# Patient Record
Sex: FEMALE | URBAN - METROPOLITAN AREA
[De-identification: names, ages, dates, MRNs, and addresses within clinical notes are randomized per-mention and may not be internally consistent; named-entity substitution may affect disease eponyms.]

---

## 2017-08-02 ENCOUNTER — OFFICE (OUTPATIENT)
Dept: URBAN - METROPOLITAN AREA CLINIC 64 | Facility: CLINIC | Age: 27
End: 2017-08-02

## 2017-08-02 DIAGNOSIS — R19.4 CHANGE IN BOWEL HABIT: ICD-10-CM

## 2017-08-02 DIAGNOSIS — R14.0 ABDOMINAL DISTENSION (GASEOUS): ICD-10-CM

## 2017-08-02 PROCEDURE — 99202 OFFICE O/P NEW SF 15 MIN: CPT | Performed by: NURSE PRACTITIONER

## 2017-08-18 ENCOUNTER — CONVERSION ENCOUNTER (OUTPATIENT)
Dept: SURGERY | Facility: CLINIC | Age: 27
End: 2017-08-18

## 2017-09-15 ENCOUNTER — OFFICE (OUTPATIENT)
Dept: URBAN - METROPOLITAN AREA CLINIC 64 | Facility: CLINIC | Age: 27
End: 2017-09-15

## 2017-09-15 VITALS
WEIGHT: 251 LBS | SYSTOLIC BLOOD PRESSURE: 108 MMHG | HEIGHT: 64 IN | DIASTOLIC BLOOD PRESSURE: 78 MMHG | HEART RATE: 74 BPM

## 2017-09-15 DIAGNOSIS — R10.13 EPIGASTRIC PAIN: ICD-10-CM

## 2017-09-15 DIAGNOSIS — R11.2 NAUSEA WITH VOMITING, UNSPECIFIED: ICD-10-CM

## 2017-09-15 DIAGNOSIS — R19.7 DIARRHEA, UNSPECIFIED: ICD-10-CM

## 2017-09-15 PROCEDURE — 99213 OFFICE O/P EST LOW 20 MIN: CPT | Performed by: NURSE PRACTITIONER

## 2017-09-15 RX ORDER — PANTOPRAZOLE SODIUM 40 MG/1
40 TABLET, DELAYED RELEASE ORAL
Qty: 30 | Refills: 11 | Status: ACTIVE
Start: 2017-09-15

## 2019-06-04 VITALS — HEART RATE: 62 BPM | DIASTOLIC BLOOD PRESSURE: 80 MMHG | WEIGHT: 255.4 LBS | SYSTOLIC BLOOD PRESSURE: 122 MMHG

## 2019-07-10 ENCOUNTER — HOSPITAL ENCOUNTER (EMERGENCY)
Facility: HOSPITAL | Age: 29
Discharge: HOME | End: 2019-07-11
Attending: EMERGENCY MEDICINE | Admitting: EMERGENCY MEDICINE

## 2019-07-10 VITALS
TEMPERATURE: 98.1 F | HEART RATE: 78 BPM | OXYGEN SATURATION: 99 % | WEIGHT: 265.88 LBS | DIASTOLIC BLOOD PRESSURE: 77 MMHG | RESPIRATION RATE: 17 BRPM | HEIGHT: 64 IN | SYSTOLIC BLOOD PRESSURE: 114 MMHG | BODY MASS INDEX: 45.39 KG/M2

## 2019-07-10 DIAGNOSIS — S46.812A TRAPEZIUS STRAIN, LEFT, INITIAL ENCOUNTER: Primary | ICD-10-CM

## 2019-07-10 DIAGNOSIS — M79.602 ARM PAIN, DIFFUSE, LEFT: ICD-10-CM

## 2019-07-10 PROCEDURE — 99283 EMERGENCY DEPT VISIT LOW MDM: CPT

## 2019-07-10 PROCEDURE — 25010000002 METHYLPREDNISOLONE PER 80 MG

## 2019-07-10 RX ORDER — CYCLOBENZAPRINE HCL 10 MG
10 TABLET ORAL 3 TIMES DAILY
Qty: 30 TABLET | Refills: 0 | Status: SHIPPED | OUTPATIENT
Start: 2019-07-10 | End: 2021-11-30

## 2019-07-11 NOTE — ED PROVIDER NOTES
Subjective   Complaint arm and trapezius pain.  This is a 29-year-old who presents with pain on her left trapezius that mildly radiates down her left arm she denies any numbness weakness no chest pain or shortness of breath she denies any history trauma but states that she did wake up this morning sleeping half on the floor and half on her bed.  The patient denies any numbness or weakness.  No chest pain or shortness breath no fevers or chills no direct trauma.  She rates pain as a sharp stabbing pain that worsens with movement with no other alleviating factors with no radiation she rates as 4/10.        History provided by:  Patient      Review of Systems   Constitutional: Negative for chills and fever.   HENT: Negative for congestion and sore throat.    Eyes: Negative for redness.   Respiratory: Negative for shortness of breath.    Cardiovascular: Negative for chest pain.   Gastrointestinal: Negative for abdominal pain.   Endocrine: Negative for cold intolerance and heat intolerance.   Genitourinary: Negative for difficulty urinating and dysuria.   Musculoskeletal: Positive for myalgias. Negative for back pain.   Skin: Negative for rash.   Allergic/Immunologic: Negative for immunocompromised state.   Neurological: Negative for dizziness and weakness.   Hematological: Negative for adenopathy.   Psychiatric/Behavioral: Negative for confusion.   All other systems reviewed and are negative.      History reviewed. No pertinent past medical history.    Allergies   Allergen Reactions   • Amoxicillin-Pot Clavulanate Nausea And Vomiting   • Cefaclor Other (See Comments)     BRUISING     • Cefixime Nausea And Vomiting and Shortness Of Breath   • Cefprozil Shortness Of Breath   • Cefuroxime Axetil Other (See Comments)   • Cephalexin Shortness Of Breath   • Cephalosporins Shortness Of Breath   • Latex Shortness Of Breath   • Promethazine Hcl Other (See Comments)     SHAKE, ANXIOUS         History reviewed. No pertinent  surgical history.    No family history on file.    Social History     Socioeconomic History   • Marital status:      Spouse name: Not on file   • Number of children: Not on file   • Years of education: Not on file   • Highest education level: Not on file           Objective   Physical Exam   Constitutional: She is oriented to person, place, and time. She appears well-developed and well-nourished. No distress.   HENT:   Head: Normocephalic and atraumatic.   Right Ear: External ear normal.   Left Ear: External ear normal.   Nose: Nose normal.   Eyes: Conjunctivae and EOM are normal. Pupils are equal, round, and reactive to light.   Neck: Normal range of motion. Neck supple. No JVD present.   Cardiovascular: Normal rate, regular rhythm, normal heart sounds and intact distal pulses.   Pulmonary/Chest: Effort normal and breath sounds normal. No stridor. She has no wheezes. She has no rales.   Abdominal: Soft. Bowel sounds are normal. She exhibits no mass. There is no tenderness. There is no rebound and no guarding. No hernia.   Musculoskeletal: Normal range of motion.   There is tenderness palpation about the left trapezius with a palpable underlying muscular nodule/trigger point felt.  This exacerbated the patient's pain.  The patient had full range of motion about the shoulder elbow and wrist without any difficulty or discomfort.  Has intact radial median ulnar  Nerves.  2+ radial pulses noted.  No erythema induration or warmth.  No palpable cords.  Strength is 5/5.   Lymphadenopathy:     She has no cervical adenopathy.   Neurological: She is alert and oriented to person, place, and time. No cranial nerve deficit.   Skin: Skin is warm and dry. Capillary refill takes less than 2 seconds. No rash noted.   Psychiatric: She has a normal mood and affect.   Nursing note and vitals reviewed.      Procedures  Trigger point injection:     Area overlying maximal tenderness/palpable nodularity was prepped and draped in a  "sterile fashion and injection was performed using a mixture of 2% lidocaine and Depo-Medrol with immediate relief of the patient's pain.  Patient tolerated procedure well and Band-Aid was applied.  No significant bleeding noted.      ED Course        No orders to display     Lab Results (last 72 hours)     ** No results found for the last 72 hours. **        Medications - No data to display  Vitals:    07/10/19 2323 07/10/19 2326   BP:  114/77   Pulse:  78   Resp:  17   Temp:  98.1 °F (36.7 °C)   SpO2:  99%   Weight: 121 kg (265 lb 14 oz)    Height: 162.6 cm (64\")                MDM  I discussed follow care instructions with the patient who expressed understanding.    Depo-Medrol dose 80 mg.    Final diagnoses:   Trapezius strain, left, initial encounter   Arm pain, diffuse, left            Judah Ramirez MD  07/10/19 9075       Judah Ramirez MD  07/20/19 9302    "

## 2019-07-11 NOTE — DISCHARGE INSTRUCTIONS
Heat pad.  Therapeutica pillow    Return for increased pain numbness weakness chest pain or shortness breath.

## 2019-07-16 ENCOUNTER — HOSPITAL ENCOUNTER (EMERGENCY)
Facility: HOSPITAL | Age: 29
Discharge: HOME | End: 2019-07-17
Attending: EMERGENCY MEDICINE | Admitting: EMERGENCY MEDICINE

## 2019-07-16 DIAGNOSIS — K52.9 GASTROENTERITIS: Primary | ICD-10-CM

## 2019-07-16 LAB
BASOPHILS # BLD AUTO: 0 10*3/MM3 (ref 0–0.2)
BASOPHILS NFR BLD AUTO: 0.3 % (ref 0–1.5)
BILIRUB UR QL STRIP: NEGATIVE
CLARITY UR: CLEAR
COLOR UR: YELLOW
DEPRECATED RDW RBC AUTO: 42.9 FL (ref 37–54)
EOSINOPHIL # BLD AUTO: 0.2 10*3/MM3 (ref 0–0.4)
EOSINOPHIL NFR BLD AUTO: 1.3 % (ref 0.3–6.2)
ERYTHROCYTE [DISTWIDTH] IN BLOOD BY AUTOMATED COUNT: 14 % (ref 12.3–15.4)
GLUCOSE UR STRIP-MCNC: NEGATIVE MG/DL
HCG SERPL QL: NEGATIVE
HCT VFR BLD AUTO: 39.8 % (ref 34–46.6)
HGB BLD-MCNC: 13.4 G/DL (ref 12–15.9)
HGB UR QL STRIP.AUTO: NEGATIVE
KETONES UR QL STRIP: NEGATIVE
LEUKOCYTE ESTERASE UR QL STRIP.AUTO: NEGATIVE
LYMPHOCYTES # BLD AUTO: 2.7 10*3/MM3 (ref 0.7–3.1)
LYMPHOCYTES NFR BLD AUTO: 21.6 % (ref 19.6–45.3)
MCH RBC QN AUTO: 29.2 PG (ref 26.6–33)
MCHC RBC AUTO-ENTMCNC: 33.7 G/DL (ref 31.5–35.7)
MCV RBC AUTO: 86.7 FL (ref 79–97)
MONOCYTES # BLD AUTO: 0.9 10*3/MM3 (ref 0.1–0.9)
MONOCYTES NFR BLD AUTO: 7.3 % (ref 5–12)
NEUTROPHILS # BLD AUTO: 8.8 10*3/MM3 (ref 1.7–7)
NEUTROPHILS NFR BLD AUTO: 69.5 % (ref 42.7–76)
NITRITE UR QL STRIP: NEGATIVE
NRBC BLD AUTO-RTO: 0.1 /100 WBC (ref 0–0.2)
PH UR STRIP.AUTO: 6 [PH] (ref 5–8)
PLATELET # BLD AUTO: 244 10*3/MM3 (ref 140–450)
PMV BLD AUTO: 9.1 FL (ref 6–12)
PROT UR QL STRIP: NEGATIVE
RBC # BLD AUTO: 4.59 10*6/MM3 (ref 3.77–5.28)
SP GR UR STRIP: 1.02 (ref 1–1.03)
UROBILINOGEN UR QL STRIP: NORMAL
WBC NRBC COR # BLD: 12.6 10*3/MM3 (ref 3.4–10.8)

## 2019-07-16 PROCEDURE — 80053 COMPREHEN METABOLIC PANEL: CPT | Performed by: EMERGENCY MEDICINE

## 2019-07-16 PROCEDURE — 81003 URINALYSIS AUTO W/O SCOPE: CPT | Performed by: EMERGENCY MEDICINE

## 2019-07-16 PROCEDURE — 99283 EMERGENCY DEPT VISIT LOW MDM: CPT

## 2019-07-16 PROCEDURE — 96374 THER/PROPH/DIAG INJ IV PUSH: CPT

## 2019-07-16 PROCEDURE — 84703 CHORIONIC GONADOTROPIN ASSAY: CPT | Performed by: EMERGENCY MEDICINE

## 2019-07-16 PROCEDURE — 85025 COMPLETE CBC W/AUTO DIFF WBC: CPT | Performed by: EMERGENCY MEDICINE

## 2019-07-16 PROCEDURE — 96375 TX/PRO/DX INJ NEW DRUG ADDON: CPT

## 2019-07-16 PROCEDURE — 25010000002 ONDANSETRON PER 1 MG: Performed by: EMERGENCY MEDICINE

## 2019-07-16 PROCEDURE — 83690 ASSAY OF LIPASE: CPT | Performed by: EMERGENCY MEDICINE

## 2019-07-16 RX ORDER — FAMOTIDINE 10 MG/ML
20 INJECTION, SOLUTION INTRAVENOUS ONCE
Status: COMPLETED | OUTPATIENT
Start: 2019-07-16 | End: 2019-07-16

## 2019-07-16 RX ORDER — ONDANSETRON 2 MG/ML
4 INJECTION INTRAMUSCULAR; INTRAVENOUS ONCE
Status: COMPLETED | OUTPATIENT
Start: 2019-07-16 | End: 2019-07-16

## 2019-07-16 RX ADMIN — ONDANSETRON 4 MG: 2 INJECTION INTRAMUSCULAR; INTRAVENOUS at 23:33

## 2019-07-16 RX ADMIN — FAMOTIDINE 20 MG: 10 INJECTION, SOLUTION INTRAVENOUS at 23:33

## 2019-07-16 RX ADMIN — SODIUM CHLORIDE 1000 ML: 0.9 INJECTION, SOLUTION INTRAVENOUS at 23:32

## 2019-07-17 ENCOUNTER — APPOINTMENT (OUTPATIENT)
Dept: CT IMAGING | Facility: HOSPITAL | Age: 29
End: 2019-07-17

## 2019-07-17 VITALS
HEIGHT: 64 IN | HEART RATE: 64 BPM | BODY MASS INDEX: 44.49 KG/M2 | OXYGEN SATURATION: 99 % | RESPIRATION RATE: 12 BRPM | DIASTOLIC BLOOD PRESSURE: 64 MMHG | SYSTOLIC BLOOD PRESSURE: 104 MMHG | WEIGHT: 260.58 LBS | TEMPERATURE: 98 F

## 2019-07-17 LAB
ALBUMIN SERPL-MCNC: 3.3 G/DL (ref 3.5–4.8)
ALBUMIN/GLOB SERPL: 1.1 G/DL (ref 1–1.7)
ALP SERPL-CCNC: 73 U/L (ref 32–91)
ALT SERPL W P-5'-P-CCNC: 9 U/L (ref 14–54)
ANION GAP SERPL CALCULATED.3IONS-SCNC: 13.8 MMOL/L (ref 5–15)
AST SERPL-CCNC: 13 U/L (ref 15–41)
BILIRUB SERPL-MCNC: 0.8 MG/DL (ref 0.3–1.2)
BUN BLD-MCNC: 11 MG/DL (ref 8–20)
BUN/CREAT SERPL: 13.8 (ref 5.4–26.2)
CALCIUM SPEC-SCNC: 8.2 MG/DL (ref 8.9–10.3)
CHLORIDE SERPL-SCNC: 104 MMOL/L (ref 101–111)
CO2 SERPL-SCNC: 19 MMOL/L (ref 22–32)
CREAT BLD-MCNC: 0.8 MG/DL (ref 0.4–1)
GFR SERPL CREATININE-BSD FRML MDRD: 84 ML/MIN/1.73
GLOBULIN UR ELPH-MCNC: 3.1 GM/DL (ref 2.5–3.8)
GLUCOSE BLD-MCNC: 89 MG/DL (ref 65–99)
LIPASE SERPL-CCNC: 43 U/L (ref 22–51)
POTASSIUM BLD-SCNC: 3.8 MMOL/L (ref 3.6–5.1)
PROT SERPL-MCNC: 6.4 G/DL (ref 6.1–7.9)
SODIUM BLD-SCNC: 133 MMOL/L (ref 136–144)

## 2019-07-17 PROCEDURE — 74177 CT ABD & PELVIS W/CONTRAST: CPT

## 2019-07-17 PROCEDURE — 0 IOPAMIDOL PER 1 ML: Performed by: EMERGENCY MEDICINE

## 2019-07-17 RX ORDER — ONDANSETRON 4 MG/1
4 TABLET, FILM COATED ORAL EVERY 8 HOURS PRN
Qty: 20 TABLET | Refills: 0 | Status: SHIPPED | OUTPATIENT
Start: 2019-07-17 | End: 2021-11-30

## 2019-07-17 RX ADMIN — IOPAMIDOL 100 ML: 755 INJECTION, SOLUTION INTRAVENOUS at 00:44

## 2020-02-17 ENCOUNTER — APPOINTMENT (OUTPATIENT)
Dept: GENERAL RADIOLOGY | Facility: HOSPITAL | Age: 30
End: 2020-02-17

## 2020-02-17 ENCOUNTER — HOSPITAL ENCOUNTER (EMERGENCY)
Facility: HOSPITAL | Age: 30
Discharge: HOME | End: 2020-02-17
Admitting: EMERGENCY MEDICINE

## 2020-02-17 VITALS
HEIGHT: 64 IN | OXYGEN SATURATION: 99 % | TEMPERATURE: 98 F | BODY MASS INDEX: 40.63 KG/M2 | DIASTOLIC BLOOD PRESSURE: 73 MMHG | HEART RATE: 104 BPM | RESPIRATION RATE: 17 BRPM | SYSTOLIC BLOOD PRESSURE: 116 MMHG | WEIGHT: 238 LBS

## 2020-02-17 DIAGNOSIS — J10.1 INFLUENZA B: Primary | ICD-10-CM

## 2020-02-17 DIAGNOSIS — R06.02 SHORTNESS OF BREATH: ICD-10-CM

## 2020-02-17 LAB
ALBUMIN SERPL-MCNC: 4.2 G/DL (ref 3.5–5.2)
ALBUMIN/GLOB SERPL: 1.2 G/DL
ALP SERPL-CCNC: 73 U/L (ref 39–117)
ALT SERPL W P-5'-P-CCNC: 7 U/L (ref 1–33)
ANION GAP SERPL CALCULATED.3IONS-SCNC: 17 MMOL/L (ref 5–15)
AST SERPL-CCNC: 13 U/L (ref 1–32)
B PERT DNA SPEC QL NAA+PROBE: NOT DETECTED
BASOPHILS # BLD AUTO: 0 10*3/MM3 (ref 0–0.2)
BASOPHILS NFR BLD AUTO: 0.6 % (ref 0–1.5)
BILIRUB SERPL-MCNC: 0.3 MG/DL (ref 0.2–1.2)
BUN BLD-MCNC: 8 MG/DL (ref 6–20)
BUN/CREAT SERPL: 7.8 (ref 7–25)
C PNEUM DNA NPH QL NAA+NON-PROBE: NOT DETECTED
CALCIUM SPEC-SCNC: 9.1 MG/DL (ref 8.6–10.5)
CHLORIDE SERPL-SCNC: 102 MMOL/L (ref 98–107)
CO2 SERPL-SCNC: 17 MMOL/L (ref 22–29)
CREAT BLD-MCNC: 1.02 MG/DL (ref 0.57–1)
D DIMER PPP FEU-MCNC: 0.42 MCGFEU/ML (ref 0.17–0.59)
DEPRECATED RDW RBC AUTO: 40.3 FL (ref 37–54)
EOSINOPHIL # BLD AUTO: 0 10*3/MM3 (ref 0–0.4)
EOSINOPHIL NFR BLD AUTO: 0 % (ref 0.3–6.2)
ERYTHROCYTE [DISTWIDTH] IN BLOOD BY AUTOMATED COUNT: 13.3 % (ref 12.3–15.4)
FLUAV H1 2009 PAND RNA NPH QL NAA+PROBE: NOT DETECTED
FLUAV H1 HA GENE NPH QL NAA+PROBE: NOT DETECTED
FLUAV H3 RNA NPH QL NAA+PROBE: NOT DETECTED
FLUAV SUBTYP SPEC NAA+PROBE: NOT DETECTED
FLUBV RNA ISLT QL NAA+PROBE: DETECTED
GFR SERPL CREATININE-BSD FRML MDRD: 64 ML/MIN/1.73
GLOBULIN UR ELPH-MCNC: 3.5 GM/DL
GLUCOSE BLD-MCNC: 95 MG/DL (ref 65–99)
HADV DNA SPEC NAA+PROBE: NOT DETECTED
HCOV 229E RNA SPEC QL NAA+PROBE: NOT DETECTED
HCOV HKU1 RNA SPEC QL NAA+PROBE: NOT DETECTED
HCOV NL63 RNA SPEC QL NAA+PROBE: NOT DETECTED
HCOV OC43 RNA SPEC QL NAA+PROBE: NOT DETECTED
HCT VFR BLD AUTO: 42.3 % (ref 34–46.6)
HGB BLD-MCNC: 14.3 G/DL (ref 12–15.9)
HMPV RNA NPH QL NAA+NON-PROBE: NOT DETECTED
HOLD SPECIMEN: NORMAL
HPIV1 RNA SPEC QL NAA+PROBE: NOT DETECTED
HPIV2 RNA SPEC QL NAA+PROBE: NOT DETECTED
HPIV3 RNA NPH QL NAA+PROBE: NOT DETECTED
HPIV4 P GENE NPH QL NAA+PROBE: NOT DETECTED
LYMPHOCYTES # BLD AUTO: 1.5 10*3/MM3 (ref 0.7–3.1)
LYMPHOCYTES NFR BLD AUTO: 23.2 % (ref 19.6–45.3)
M PNEUMO IGG SER IA-ACNC: NOT DETECTED
MCH RBC QN AUTO: 29.1 PG (ref 26.6–33)
MCHC RBC AUTO-ENTMCNC: 33.8 G/DL (ref 31.5–35.7)
MCV RBC AUTO: 86 FL (ref 79–97)
MONOCYTES # BLD AUTO: 1.4 10*3/MM3 (ref 0.1–0.9)
MONOCYTES NFR BLD AUTO: 22.6 % (ref 5–12)
NEUTROPHILS # BLD AUTO: 3.4 10*3/MM3 (ref 1.7–7)
NEUTROPHILS NFR BLD AUTO: 53.6 % (ref 42.7–76)
NRBC BLD AUTO-RTO: 0.1 /100 WBC (ref 0–0.2)
NT-PROBNP SERPL-MCNC: 39.1 PG/ML (ref 5–450)
PLATELET # BLD AUTO: 228 10*3/MM3 (ref 140–450)
PMV BLD AUTO: 9.1 FL (ref 6–12)
POTASSIUM BLD-SCNC: 3.4 MMOL/L (ref 3.5–5.2)
PROT SERPL-MCNC: 7.7 G/DL (ref 6–8.5)
RBC # BLD AUTO: 4.92 10*6/MM3 (ref 3.77–5.28)
RHINOVIRUS RNA SPEC NAA+PROBE: NOT DETECTED
RSV RNA NPH QL NAA+NON-PROBE: NOT DETECTED
SODIUM BLD-SCNC: 136 MMOL/L (ref 136–145)
TROPONIN T SERPL-MCNC: <0.01 NG/ML (ref 0–0.03)
WBC NRBC COR # BLD: 6.4 10*3/MM3 (ref 3.4–10.8)

## 2020-02-17 PROCEDURE — 83880 ASSAY OF NATRIURETIC PEPTIDE: CPT | Performed by: PHYSICIAN ASSISTANT

## 2020-02-17 PROCEDURE — 94799 UNLISTED PULMONARY SVC/PX: CPT

## 2020-02-17 PROCEDURE — 85025 COMPLETE CBC W/AUTO DIFF WBC: CPT | Performed by: PHYSICIAN ASSISTANT

## 2020-02-17 PROCEDURE — 71046 X-RAY EXAM CHEST 2 VIEWS: CPT

## 2020-02-17 PROCEDURE — 80053 COMPREHEN METABOLIC PANEL: CPT | Performed by: PHYSICIAN ASSISTANT

## 2020-02-17 PROCEDURE — 93005 ELECTROCARDIOGRAM TRACING: CPT | Performed by: PHYSICIAN ASSISTANT

## 2020-02-17 PROCEDURE — 25010000002 METHYLPREDNISOLONE PER 125 MG: Performed by: PHYSICIAN ASSISTANT

## 2020-02-17 PROCEDURE — 94640 AIRWAY INHALATION TREATMENT: CPT

## 2020-02-17 PROCEDURE — 84484 ASSAY OF TROPONIN QUANT: CPT | Performed by: PHYSICIAN ASSISTANT

## 2020-02-17 PROCEDURE — 0099U HC BIOFIRE FILMARRAY RESP PANEL 1: CPT | Performed by: PHYSICIAN ASSISTANT

## 2020-02-17 PROCEDURE — 99283 EMERGENCY DEPT VISIT LOW MDM: CPT

## 2020-02-17 PROCEDURE — 96374 THER/PROPH/DIAG INJ IV PUSH: CPT

## 2020-02-17 PROCEDURE — 85379 FIBRIN DEGRADATION QUANT: CPT | Performed by: PHYSICIAN ASSISTANT

## 2020-02-17 RX ORDER — LEVOCETIRIZINE DIHYDROCHLORIDE 5 MG/1
5 TABLET, FILM COATED ORAL EVERY EVENING
COMMUNITY

## 2020-02-17 RX ORDER — PREDNISONE 20 MG/1
TABLET ORAL
Qty: 18 TABLET | Refills: 0 | Status: SHIPPED | OUTPATIENT
Start: 2020-02-17 | End: 2020-02-26

## 2020-02-17 RX ORDER — METHYLPREDNISOLONE SODIUM SUCCINATE 125 MG/2ML
125 INJECTION, POWDER, LYOPHILIZED, FOR SOLUTION INTRAMUSCULAR; INTRAVENOUS ONCE
Status: COMPLETED | OUTPATIENT
Start: 2020-02-17 | End: 2020-02-17

## 2020-02-17 RX ORDER — OSELTAMIVIR PHOSPHATE 75 MG/1
75 CAPSULE ORAL 2 TIMES DAILY
Qty: 10 CAPSULE | Refills: 0 | Status: SHIPPED | OUTPATIENT
Start: 2020-02-17 | End: 2021-11-30

## 2020-02-17 RX ORDER — IPRATROPIUM BROMIDE AND ALBUTEROL SULFATE 2.5; .5 MG/3ML; MG/3ML
3 SOLUTION RESPIRATORY (INHALATION) ONCE
Status: COMPLETED | OUTPATIENT
Start: 2020-02-17 | End: 2020-02-17

## 2020-02-17 RX ORDER — BROMPHENIRAMINE MALEATE, PSEUDOEPHEDRINE HYDROCHLORIDE, AND DEXTROMETHORPHAN HYDROBROMIDE 2; 30; 10 MG/5ML; MG/5ML; MG/5ML
5 SYRUP ORAL 4 TIMES DAILY PRN
Qty: 473 ML | Refills: 0 | Status: SHIPPED | OUTPATIENT
Start: 2020-02-17 | End: 2021-11-30

## 2020-02-17 RX ORDER — SODIUM CHLORIDE 0.9 % (FLUSH) 0.9 %
10 SYRINGE (ML) INJECTION AS NEEDED
Status: DISCONTINUED | OUTPATIENT
Start: 2020-02-17 | End: 2020-02-17 | Stop reason: HOSPADM

## 2020-02-17 RX ORDER — BUDESONIDE AND FORMOTEROL FUMARATE DIHYDRATE 160; 4.5 UG/1; UG/1
2 AEROSOL RESPIRATORY (INHALATION)
Qty: 1 INHALER | Refills: 0 | Status: SHIPPED | OUTPATIENT
Start: 2020-02-17 | End: 2022-03-08 | Stop reason: SDUPTHER

## 2020-02-17 RX ADMIN — IPRATROPIUM BROMIDE AND ALBUTEROL SULFATE 3 ML: .5; 3 SOLUTION RESPIRATORY (INHALATION) at 13:32

## 2020-02-17 RX ADMIN — IPRATROPIUM BROMIDE AND ALBUTEROL SULFATE 3 ML: .5; 3 SOLUTION RESPIRATORY (INHALATION) at 14:09

## 2020-02-17 RX ADMIN — METHYLPREDNISOLONE SODIUM SUCCINATE 125 MG: 125 INJECTION, POWDER, FOR SOLUTION INTRAMUSCULAR; INTRAVENOUS at 13:20

## 2020-02-17 NOTE — ED NOTES
Unable to complete EKG due to pt not being in room at this time.        Rama Hough  02/17/20 7256

## 2020-02-17 NOTE — ED NOTES
Pt is resting in bed vitals are stable waiting for plan of care     Nadya Rinaldi RN  02/17/20 5598

## 2020-02-17 NOTE — ED NOTES
Care transferred from [facility] to [facility]     Nadya Rinaldi RN  02/17/20 4951

## 2020-02-17 NOTE — ED NOTES
SOA, chills, sweats, body aches. Subjective fever. Has history of asthma. Nausea this morning. Hands feel numb     Nena Muir RN  02/17/20 6183

## 2020-02-17 NOTE — DISCHARGE INSTRUCTIONS
Return to the ER for any worsening symptoms.  Follow-up with primary care provider if you do not have a PCP see referral.

## 2020-02-17 NOTE — ED PROVIDER NOTES
Subjective   History: Patient is a 29-year-old female past medical history significant for asthma and previous pulmonary is who presents to the ER with 1 day history of shortness of breath.  She reports that she normally does not need albuterol nebulizer for her asthma that is well controlled without medication but yesterday started to feel acutely short of breath denies any chest pain chest pressure nausea vomiting lightheadedness or dizziness.  Over the last 12 hours she was taking breathing treatments every 3-4 hours as directed.      Onset: 1 day  Location: chest  Duration: constant  Character: SOA  Aggravating/Alleviating factors: None  Radiation None  Severity: moderate            Review of Systems   Constitutional: Negative for fatigue and fever.   HENT: Negative for congestion and sore throat.    Respiratory: Positive for shortness of breath. Negative for cough.    Cardiovascular: Negative for chest pain and palpitations.   Gastrointestinal: Negative for abdominal distention, abdominal pain, nausea and vomiting.   Genitourinary: Negative.    Musculoskeletal: Negative.    Neurological: Negative.    Psychiatric/Behavioral: Negative.        Past Medical History:   Diagnosis Date   • Asthma    • PCOS (polycystic ovarian syndrome)    • Tachycardia        Allergies   Allergen Reactions   • Amoxicillin-Pot Clavulanate Nausea And Vomiting   • Cefaclor Other (See Comments)     BRUISING     • Cefixime Nausea And Vomiting and Shortness Of Breath   • Cefprozil Shortness Of Breath   • Cefuroxime Axetil Other (See Comments)   • Cephalexin Shortness Of Breath   • Cephalosporins Shortness Of Breath   • Latex Shortness Of Breath   • Promethazine Hcl Other (See Comments)     SHAKE, ANXIOUS         Past Surgical History:   Procedure Laterality Date   • ADENOIDECTOMY     • TONSILLECTOMY         History reviewed. No pertinent family history.    Social History     Socioeconomic History   • Marital status:      Spouse name:  Not on file   • Number of children: Not on file   • Years of education: Not on file   • Highest education level: Not on file   Tobacco Use   • Smoking status: Never Smoker   Substance and Sexual Activity   • Alcohol use: Yes   • Drug use: Yes     Types: Marijuana   • Sexual activity: Yes     Partners: Male           Objective   Physical Exam   Constitutional: She is oriented to person, place, and time. She appears well-developed and well-nourished.   HENT:   Head: Normocephalic and atraumatic.   Eyes: Pupils are equal, round, and reactive to light.   Neck: Normal range of motion.   Cardiovascular: Normal rate and regular rhythm.   Pulmonary/Chest: Effort normal. She has no decreased breath sounds. She has wheezes in the right upper field, the left upper field and the left lower field.   Musculoskeletal: Normal range of motion.   Neurological: She is alert and oriented to person, place, and time.   Skin: Skin is warm and dry.   Psychiatric: She has a normal mood and affect. Her behavior is normal.       Procedures           ED Course  ED Course as of Feb 17 1454   Mon Feb 17, 2020   1344 EKG sinus rhythm rate of 84    [MG]      ED Course User Index  [MG] Bere Red PA-C          Xr Chest 2 View    Result Date: 2/17/2020  No acute chest finding.  Electronically Signed By-Dr. Laurel Man MD On:2/17/2020 1:35 PM This report was finalized on [id number] by Dr. Laurel Man MD.    Labs Reviewed   RESPIRATORY PANEL, PCR - Abnormal; Notable for the following components:       Result Value    Influenza B PCR Detected (*)     All other components within normal limits   COMPREHENSIVE METABOLIC PANEL - Abnormal; Notable for the following components:    Creatinine 1.02 (*)     Potassium 3.4 (*)     CO2 17.0 (*)     Anion Gap 17.0 (*)     All other components within normal limits    Narrative:     GFR Normal >60  Chronic Kidney Disease <60  Kidney Failure <15     CBC WITH AUTO DIFFERENTIAL - Abnormal; Notable for the  following components:    Monocyte % 22.6 (*)     Eosinophil % 0.0 (*)     Monocytes, Absolute 1.40 (*)     All other components within normal limits   BNP (IN-HOUSE) - Normal    Narrative:     Among patients with dyspnea, NT-proBNP is highly sensitive for the detection of acute congestive heart failure. In addition NT-proBNP of <300 pg/ml effectively rules out acute congestive heart failure with 99% negative predictive value.    Results may be falsely decreased if patient taking Biotin.     TROPONIN (IN-HOUSE) - Normal    Narrative:     Troponin T Reference Range:  <= 0.03 ng/mL-   Negative for AMI  >0.03 ng/mL-     Abnormal for myocardial necrosis.  Clinicians would have to utilize clinical acumen, EKG, Troponin and serial changes to determine if it is an Acute Myocardial Infarction or myocardial injury due to an underlying chronic condition.       Results may be falsely decreased if patient taking Biotin.     D-DIMER, QUANTITATIVE - Normal    Narrative:     Reference Range  --------------------------------------------------------------------     < 0.50   Negative Predictive Value  0.50-0.59   Indeterminate    >= 0.60   Probable VTE             A very low percentage of patients with DVT may yield D-Dimer results   below the cut-off of 0.50 MCGFEU/mL.  This is known to be more   prevalent in patients with distal DVT.             Results of this test should always be interpreted in conjunction with   the patient's medical history, clinical presentation and other   findings.  Clinical diagnosis should not be based on the result of   INNOVANCE D-Dimer alone.   CBC AND DIFFERENTIAL    Narrative:     The following orders were created for panel order CBC & Differential.  Procedure                               Abnormality         Status                     ---------                               -----------         ------                     CBC Auto Differential[[id number]]        Abnormal            Final result                  Please view results for these tests on the individual orders.   EXTRA TUBES    Narrative:     The following orders were created for panel order Extra Tubes.  Procedure                               Abnormality         Status                     ---------                               -----------         ------                     Gold Top - SST[[id number]]                                   In process                   Please view results for these tests on the individual orders.   GOLD TOP - SST     Medications   sodium chloride 0.9 % flush 10 mL (has no administration in time range)   ipratropium-albuterol (DUO-NEB) nebulizer solution 3 mL (3 mL Nebulization Given 2/17/20 1332)   methylPREDNISolone sodium succinate (SOLU-Medrol) injection 125 mg (125 mg Intravenous Given 2/17/20 1320)   ipratropium-albuterol (DUO-NEB) nebulizer solution 3 mL (3 mL Nebulization Given 2/17/20 1409)                                       MDM  Number of Diagnoses or Management Options  Influenza B:   Shortness of breath:   Diagnosis management comments: DISPOSITION:   Chart Review:  Comorbidity:  has a past medical history of Asthma, PCOS (polycystic ovarian syndrome), and Tachycardia.  Differentials:this list is not all inclusive and does not constitute the entirety of considered causes --> URTI, pneumonia, asthma exacerbation, influenza, PE  ECG: interpreted by ER physician and reviewed by myself: Sinus rhythm  Labs: D-dimer negative, creatinine 1.02, troponin negative, influenza B positive    Imaging: Was interpreted by physician and reviewed by myself:  Xr Chest 2 View    Result Date: 2/17/2020  No acute chest finding.  Electronically Signed By-Dr. Laurel Man MD On:2/17/2020 1:35 PM This report was finalized on [id number] by Dr. Laurel Man MD.      Disposition/Treatment:  Patient is a 29-year-old female presents to the ER with shortness of breath she has a history of asthma.  She is out of her Symbicort inhaler she  is been taking albuterol nebulizer at home.  Patient was found to have flu.  Patient was given 2 breathing treatments in the ER peak flow was above 450.  Patient was discharged home with a steroid twice a refill on her inhaler, Tamiflu and Bromfed she was stable at time of discharge return precaution follow-up instruction provided she was in agreement with plan.       Amount and/or Complexity of Data Reviewed  Clinical lab tests: reviewed  Tests in the radiology section of CPT®: reviewed  Tests in the medicine section of CPT®: reviewed    Patient Progress  Patient progress: stable      Final diagnoses:   Influenza B   Shortness of breath            Bere Red, WENDY  02/17/20 1454

## 2021-02-04 PROBLEM — A49.02 MRSA INFECTION: Status: ACTIVE | Noted: 2021-02-04

## 2021-02-04 PROBLEM — R13.10 DYSPHAGIA: Status: ACTIVE | Noted: 2017-08-18

## 2021-02-04 PROBLEM — I27.82 CHRONIC PULMONARY EMBOLISM: Status: ACTIVE | Noted: 2017-08-18

## 2021-02-04 PROBLEM — D64.9 ANEMIA: Status: ACTIVE | Noted: 2021-02-04

## 2021-02-04 PROBLEM — E66.01 OBESITY, MORBID (MORE THAN 100 LBS OVER IDEAL WEIGHT OR BMI > 40) (HCC): Status: ACTIVE | Noted: 2021-02-04

## 2021-02-04 PROBLEM — G43.909 HEADACHE, MIGRAINE: Status: ACTIVE | Noted: 2021-02-04

## 2021-02-04 PROCEDURE — U0003 INFECTIOUS AGENT DETECTION BY NUCLEIC ACID (DNA OR RNA); SEVERE ACUTE RESPIRATORY SYNDROME CORONAVIRUS 2 (SARS-COV-2) (CORONAVIRUS DISEASE [COVID-19]), AMPLIFIED PROBE TECHNIQUE, MAKING USE OF HIGH THROUGHPUT TECHNOLOGIES AS DESCRIBED BY CMS-2020-01-R: HCPCS | Performed by: FAMILY MEDICINE

## 2021-02-06 ENCOUNTER — TELEPHONE (OUTPATIENT)
Dept: URGENT CARE | Facility: CLINIC | Age: 31
End: 2021-02-06

## 2021-02-06 NOTE — TELEPHONE ENCOUNTER
I called this patient this morning and notified them that their Covid test was positive.  At this point they states her symptoms are stable.  They were reminded they would not need to remain on home quarantine for a full 10 days.  We discussed what full quarantine meant.  Reminded to go to the emergency room for respiratory distress.

## 2021-02-17 ENCOUNTER — APPOINTMENT (OUTPATIENT)
Dept: CT IMAGING | Facility: HOSPITAL | Age: 31
End: 2021-02-17

## 2021-02-17 ENCOUNTER — HOSPITAL ENCOUNTER (EMERGENCY)
Facility: HOSPITAL | Age: 31
Discharge: HOME | End: 2021-02-17
Attending: EMERGENCY MEDICINE | Admitting: EMERGENCY MEDICINE

## 2021-02-17 VITALS
RESPIRATION RATE: 16 BRPM | HEART RATE: 95 BPM | OXYGEN SATURATION: 98 % | WEIGHT: 273.81 LBS | BODY MASS INDEX: 46.75 KG/M2 | HEIGHT: 64 IN | TEMPERATURE: 98.6 F | SYSTOLIC BLOOD PRESSURE: 131 MMHG | DIASTOLIC BLOOD PRESSURE: 86 MMHG

## 2021-02-17 DIAGNOSIS — J18.9 PNEUMONITIS: ICD-10-CM

## 2021-02-17 DIAGNOSIS — R07.9 CHEST PAIN, UNSPECIFIED TYPE: ICD-10-CM

## 2021-02-17 DIAGNOSIS — R07.9 CHEST PAIN IN ADULT: Primary | ICD-10-CM

## 2021-02-17 DIAGNOSIS — U07.1 COVID-19: ICD-10-CM

## 2021-02-17 LAB
ANION GAP SERPL CALCULATED.3IONS-SCNC: 9 MMOL/L (ref 5–15)
BASOPHILS # BLD AUTO: 0 10*3/MM3 (ref 0–0.2)
BASOPHILS NFR BLD AUTO: 0.2 % (ref 0–1.5)
BUN SERPL-MCNC: 11 MG/DL (ref 6–20)
BUN/CREAT SERPL: 12.8 (ref 7–25)
CALCIUM SPEC-SCNC: 8.9 MG/DL (ref 8.6–10.5)
CHLORIDE SERPL-SCNC: 108 MMOL/L (ref 98–107)
CO2 SERPL-SCNC: 23 MMOL/L (ref 22–29)
CREAT SERPL-MCNC: 0.86 MG/DL (ref 0.57–1)
DEPRECATED RDW RBC AUTO: 41.6 FL (ref 37–54)
EOSINOPHIL # BLD AUTO: 0 10*3/MM3 (ref 0–0.4)
EOSINOPHIL NFR BLD AUTO: 0 % (ref 0.3–6.2)
ERYTHROCYTE [DISTWIDTH] IN BLOOD BY AUTOMATED COUNT: 13.7 % (ref 12.3–15.4)
GFR SERPL CREATININE-BSD FRML MDRD: 77 ML/MIN/1.73
GLUCOSE SERPL-MCNC: 100 MG/DL (ref 65–99)
HCT VFR BLD AUTO: 38.6 % (ref 34–46.6)
HGB BLD-MCNC: 12.8 G/DL (ref 12–15.9)
LYMPHOCYTES # BLD AUTO: 1.6 10*3/MM3 (ref 0.7–3.1)
LYMPHOCYTES NFR BLD AUTO: 7.8 % (ref 19.6–45.3)
MCH RBC QN AUTO: 29 PG (ref 26.6–33)
MCHC RBC AUTO-ENTMCNC: 33.2 G/DL (ref 31.5–35.7)
MCV RBC AUTO: 87.5 FL (ref 79–97)
MONOCYTES # BLD AUTO: 0.8 10*3/MM3 (ref 0.1–0.9)
MONOCYTES NFR BLD AUTO: 3.8 % (ref 5–12)
NEUTROPHILS NFR BLD AUTO: 18.5 10*3/MM3 (ref 1.7–7)
NEUTROPHILS NFR BLD AUTO: 88.2 % (ref 42.7–76)
NRBC BLD AUTO-RTO: 0 /100 WBC (ref 0–0.2)
PLATELET # BLD AUTO: 264 10*3/MM3 (ref 140–450)
PMV BLD AUTO: 8.9 FL (ref 6–12)
POTASSIUM SERPL-SCNC: 4.7 MMOL/L (ref 3.5–5.2)
RBC # BLD AUTO: 4.41 10*6/MM3 (ref 3.77–5.28)
SODIUM SERPL-SCNC: 140 MMOL/L (ref 136–145)
WBC # BLD AUTO: 21 10*3/MM3 (ref 3.4–10.8)

## 2021-02-17 PROCEDURE — 0 IOPAMIDOL PER 1 ML: Performed by: EMERGENCY MEDICINE

## 2021-02-17 PROCEDURE — 71275 CT ANGIOGRAPHY CHEST: CPT

## 2021-02-17 PROCEDURE — 99283 EMERGENCY DEPT VISIT LOW MDM: CPT

## 2021-02-17 PROCEDURE — 85025 COMPLETE CBC W/AUTO DIFF WBC: CPT | Performed by: EMERGENCY MEDICINE

## 2021-02-17 PROCEDURE — 80048 BASIC METABOLIC PNL TOTAL CA: CPT | Performed by: EMERGENCY MEDICINE

## 2021-02-17 PROCEDURE — 93005 ELECTROCARDIOGRAM TRACING: CPT | Performed by: EMERGENCY MEDICINE

## 2021-02-17 RX ORDER — HYDROCODONE BITARTRATE AND ACETAMINOPHEN 10; 325 MG/1; MG/1
1 TABLET ORAL EVERY 6 HOURS PRN
Qty: 12 TABLET | Refills: 0 | Status: SHIPPED | OUTPATIENT
Start: 2021-02-17 | End: 2021-11-30

## 2021-02-17 RX ORDER — HYDROCODONE BITARTRATE AND ACETAMINOPHEN 10; 325 MG/1; MG/1
1 TABLET ORAL ONCE
Status: DISCONTINUED | OUTPATIENT
Start: 2021-02-17 | End: 2021-02-17

## 2021-02-17 RX ORDER — SODIUM CHLORIDE 0.9 % (FLUSH) 0.9 %
10 SYRINGE (ML) INJECTION AS NEEDED
Status: DISCONTINUED | OUTPATIENT
Start: 2021-02-17 | End: 2021-02-17 | Stop reason: HOSPADM

## 2021-02-17 RX ORDER — HYDROCODONE BITARTRATE AND ACETAMINOPHEN 10; 325 MG/1; MG/1
1 TABLET ORAL EVERY 6 HOURS PRN
Qty: 12 TABLET | Refills: 0 | Status: SHIPPED | OUTPATIENT
Start: 2021-02-17 | End: 2021-02-17

## 2021-02-17 RX ORDER — ALBUTEROL SULFATE 1.25 MG/3ML
1 SOLUTION RESPIRATORY (INHALATION) EVERY 6 HOURS PRN
Qty: 25 EACH | Refills: 0 | Status: SHIPPED | OUTPATIENT
Start: 2021-02-17

## 2021-02-17 RX ADMIN — SODIUM CHLORIDE 1000 ML: 9 INJECTION, SOLUTION INTRAVENOUS at 12:58

## 2021-02-17 RX ADMIN — HYDROCODONE BITARTRATE AND ACETAMINOPHEN 1 TABLET: 10; 325 TABLET ORAL at 13:01

## 2021-02-17 RX ADMIN — IOPAMIDOL 100 ML: 755 INJECTION, SOLUTION INTRAVENOUS at 13:54

## 2021-02-17 NOTE — ED PROVIDER NOTES
Subjective   History of Present Illness  30-year-old female tested positive for Covid 13 days ago and has been treated with steroids and antibiotics.  She states that now she is having sharp pains in the chest which started in the right side of the back and radiate around to the right anterior chest.  She states she has increased shortness of breath.  Patient has had a cough with little in the way of sputum production.  She has had a low-grade fever.  She denies vomiting or diarrhea.  The patient does have a history of a pulmonary embolism 2 years ago that was treated with Xarelto.  She denies any history of a DVT involving the extremities.  She does have a history of asthma as well as polycystic ovaries and tachycardia.  Review of Systems   Constitutional: Positive for activity change, fatigue and fever.   HENT: Negative.    Eyes: Negative.    Respiratory: Positive for cough and shortness of breath.    Cardiovascular: Positive for chest pain.   Gastrointestinal: Negative.    Endocrine: Negative.    Genitourinary: Negative.    Musculoskeletal: Positive for myalgias.   Skin: Negative.    Allergic/Immunologic: Negative.    Neurological: Negative.    Hematological: Negative.    Psychiatric/Behavioral: Negative.        Past Medical History:   Diagnosis Date   • Anxiety    • Asthma    • Migraine    • MRSA (methicillin resistant Staphylococcus aureus)    • PCOS (polycystic ovarian syndrome)    • PE (pulmonary thromboembolism) (CMS/[facility]) 2019   • Staph infection    • Tachycardia        Allergies   Allergen Reactions   • Amoxicillin-Pot Clavulanate Nausea And Vomiting   • Cefaclor Other (See Comments)     BRUISING     • Cefixime Nausea And Vomiting and Shortness Of Breath   • Cefprozil Shortness Of Breath   • Cefuroxime Axetil Other (See Comments)   • Cephalexin Shortness Of Breath   • Cephalosporins Shortness Of Breath   • Latex Shortness Of Breath   • Lamictal [Lamotrigine] Rash   • Promethazine Hcl Other (See Comments)      LILIAM BLANKENSHIP         Past Surgical History:   Procedure Laterality Date   • ADENOIDECTOMY     • TONSILLECTOMY         No family history on file.    Social History     Socioeconomic History   • Marital status:      Spouse name: Not on file   • Number of children: Not on file   • Years of education: Not on file   • Highest education level: Not on file   Tobacco Use   • Smoking status: Never Smoker   • Smokeless tobacco: Never Used   Substance and Sexual Activity   • Alcohol use: Yes   • Drug use: Yes     Types: Marijuana   • Sexual activity: Yes     Partners: Male           Objective   Physical Exam  Patient is awake and alert she was afebrile vital signs are stable her O2 sat was 97% on room air.  The HEENT exam pupils equal round reactive to light EOMs are full ENT unremarkable neck is supple the chest was remarkable for some rales cardiovascular exam reveals a regular rhythm the abdomen is soft nontender she has no cyanosis clubbing or edema negative Homans  Procedures           ED Course      Results for orders placed or performed during the hospital encounter of 02/17/21   Basic Metabolic Panel    Specimen: Blood   Result Value Ref Range    Glucose 100 (H) 65 - 99 mg/dL    BUN 11 6 - 20 mg/dL    Creatinine 0.86 0.57 - 1.00 mg/dL    Sodium 140 136 - 145 mmol/L    Potassium 4.7 3.5 - 5.2 mmol/L    Chloride 108 (H) 98 - 107 mmol/L    CO2 23.0 22.0 - 29.0 mmol/L    Calcium 8.9 8.6 - 10.5 mg/dL    eGFR Non African Amer 77 >60 mL/min/1.73    BUN/Creatinine Ratio 12.8 7.0 - 25.0    Anion Gap 9.0 5.0 - 15.0 mmol/L   CBC Auto Differential    Specimen: Blood   Result Value Ref Range    WBC 21.00 (H) 3.40 - 10.80 10*3/mm3    RBC 4.41 3.77 - 5.28 10*6/mm3    Hemoglobin 12.8 12.0 - 15.9 g/dL    Hematocrit 38.6 34.0 - 46.6 %    MCV 87.5 79.0 - 97.0 fL    MCH 29.0 26.6 - 33.0 pg    MCHC 33.2 31.5 - 35.7 g/dL    RDW 13.7 12.3 - 15.4 %    RDW-SD 41.6 37.0 - 54.0 fl    MPV 8.9 6.0 - 12.0 fL    Platelets 264 140 - 450  10*3/mm3    Neutrophil % 88.2 (H) 42.7 - 76.0 %    Lymphocyte % 7.8 (L) 19.6 - 45.3 %    Monocyte % 3.8 (L) 5.0 - 12.0 %    Eosinophil % 0.0 (L) 0.3 - 6.2 %    Basophil % 0.2 0.0 - 1.5 %    Neutrophils, Absolute 18.50 (H) 1.70 - 7.00 10*3/mm3    Lymphocytes, Absolute 1.60 0.70 - 3.10 10*3/mm3    Monocytes, Absolute 0.80 0.10 - 0.90 10*3/mm3    Eosinophils, Absolute 0.00 0.00 - 0.40 10*3/mm3    Basophils, Absolute 0.00 0.00 - 0.20 10*3/mm3    nRBC 0.0 0.0 - 0.2 /100 WBC   ECG 12 Lead   Result Value Ref Range    QT Interval 342 ms     Medications   sodium chloride 0.9 % bolus 1,000 mL (0 mL Intravenous Stopped 2/17/21 1328)   iopamidol (ISOVUE-370) 76 % injection 100 mL (100 mL Intravenous Given 2/17/21 1354)     Xr Chest 1 View    Result Date: 2/23/2021  Normal chest.  Electronically Signed By-Laurel Man MD On:2/23/2021 12:07 PM This report was finalized on [id number] by  Laurel Man MD.                               MDM    The patient has no evidence of a pulmonary embolism on x-ray.  There are some groundglass densities in the lungs with intervening areas of lucency particularly in the lower lobes which suggest chronic air trapping.  There is no evidence of acute pneumonia.  The patient's oxygen saturation was 97% on room air.  The patient currently is on Zithromax.  She will be given a new prescription for her albuterol to use in her nebulizer.  The patient was also given 3 days of Norco for her chest pain.  The patient is currently recovering from Covid.    Impression:  #1 chest pain  #2 COVID-19  #3 pneumonitis     Brandon Lance MD  02/17/21 1255       Brandon Lance MD  02/17/21 1305       Brandon Lance MD  02/17/21 9085       Brandon Lance MD  02/25/21 2190

## 2021-02-17 NOTE — DISCHARGE INSTRUCTIONS
Continue current medications.  Use albuterol every 6 hours for shortness of breath.  May use Norco for pain if needed.  Follow-up with your doctor this coming Friday as scheduled.

## 2021-02-18 LAB — QT INTERVAL: 342 MS

## 2021-02-23 ENCOUNTER — HOSPITAL ENCOUNTER (EMERGENCY)
Facility: HOSPITAL | Age: 31
Discharge: HOME | End: 2021-02-23
Admitting: EMERGENCY MEDICINE

## 2021-02-23 ENCOUNTER — APPOINTMENT (OUTPATIENT)
Dept: GENERAL RADIOLOGY | Facility: HOSPITAL | Age: 31
End: 2021-02-23

## 2021-02-23 VITALS
SYSTOLIC BLOOD PRESSURE: 121 MMHG | HEART RATE: 85 BPM | WEIGHT: 269.84 LBS | BODY MASS INDEX: 46.07 KG/M2 | RESPIRATION RATE: 18 BRPM | HEIGHT: 64 IN | DIASTOLIC BLOOD PRESSURE: 72 MMHG | TEMPERATURE: 98.1 F | OXYGEN SATURATION: 96 %

## 2021-02-23 DIAGNOSIS — M79.10 MYALGIA: ICD-10-CM

## 2021-02-23 DIAGNOSIS — R50.9 FEVER IN ADULT: Primary | ICD-10-CM

## 2021-02-23 LAB
ALBUMIN SERPL-MCNC: 3.6 G/DL (ref 3.5–5.2)
ALBUMIN/GLOB SERPL: 1.1 G/DL
ALP SERPL-CCNC: 75 U/L (ref 39–117)
ALT SERPL W P-5'-P-CCNC: 9 U/L (ref 1–33)
ANION GAP SERPL CALCULATED.3IONS-SCNC: 9 MMOL/L (ref 5–15)
AST SERPL-CCNC: 13 U/L (ref 1–32)
BASOPHILS # BLD AUTO: 0.1 10*3/MM3 (ref 0–0.2)
BASOPHILS NFR BLD AUTO: 0.6 % (ref 0–1.5)
BILIRUB SERPL-MCNC: 0.4 MG/DL (ref 0–1.2)
BUN SERPL-MCNC: 12 MG/DL (ref 6–20)
BUN/CREAT SERPL: 13.6 (ref 7–25)
CALCIUM SPEC-SCNC: 8.8 MG/DL (ref 8.6–10.5)
CHLORIDE SERPL-SCNC: 105 MMOL/L (ref 98–107)
CO2 SERPL-SCNC: 23 MMOL/L (ref 22–29)
CREAT SERPL-MCNC: 0.88 MG/DL (ref 0.57–1)
DEPRECATED RDW RBC AUTO: 42 FL (ref 37–54)
EOSINOPHIL # BLD AUTO: 0.6 10*3/MM3 (ref 0–0.4)
EOSINOPHIL NFR BLD AUTO: 4.1 % (ref 0.3–6.2)
ERYTHROCYTE [DISTWIDTH] IN BLOOD BY AUTOMATED COUNT: 13.8 % (ref 12.3–15.4)
FLUAV SUBTYP SPEC NAA+PROBE: NOT DETECTED
FLUBV RNA ISLT QL NAA+PROBE: NOT DETECTED
GFR SERPL CREATININE-BSD FRML MDRD: 75 ML/MIN/1.73
GLOBULIN UR ELPH-MCNC: 3.4 GM/DL
GLUCOSE SERPL-MCNC: 84 MG/DL (ref 65–99)
HCT VFR BLD AUTO: 39.2 % (ref 34–46.6)
HGB BLD-MCNC: 13.1 G/DL (ref 12–15.9)
LYMPHOCYTES # BLD AUTO: 3.3 10*3/MM3 (ref 0.7–3.1)
LYMPHOCYTES NFR BLD AUTO: 21.6 % (ref 19.6–45.3)
MCH RBC QN AUTO: 29.3 PG (ref 26.6–33)
MCHC RBC AUTO-ENTMCNC: 33.5 G/DL (ref 31.5–35.7)
MCV RBC AUTO: 87.4 FL (ref 79–97)
MONOCYTES # BLD AUTO: 1.2 10*3/MM3 (ref 0.1–0.9)
MONOCYTES NFR BLD AUTO: 7.7 % (ref 5–12)
NEUTROPHILS NFR BLD AUTO: 10.2 10*3/MM3 (ref 1.7–7)
NEUTROPHILS NFR BLD AUTO: 66 % (ref 42.7–76)
NRBC BLD AUTO-RTO: 0 /100 WBC (ref 0–0.2)
PLATELET # BLD AUTO: 244 10*3/MM3 (ref 140–450)
PMV BLD AUTO: 8.4 FL (ref 6–12)
POTASSIUM SERPL-SCNC: 4.5 MMOL/L (ref 3.5–5.2)
PROT SERPL-MCNC: 7 G/DL (ref 6–8.5)
RBC # BLD AUTO: 4.48 10*6/MM3 (ref 3.77–5.28)
SODIUM SERPL-SCNC: 137 MMOL/L (ref 136–145)
WBC # BLD AUTO: 15.5 10*3/MM3 (ref 3.4–10.8)

## 2021-02-23 PROCEDURE — 25010000002 METHYLPREDNISOLONE PER 125 MG: Performed by: NURSE PRACTITIONER

## 2021-02-23 PROCEDURE — 80053 COMPREHEN METABOLIC PANEL: CPT | Performed by: NURSE PRACTITIONER

## 2021-02-23 PROCEDURE — 94799 UNLISTED PULMONARY SVC/PX: CPT

## 2021-02-23 PROCEDURE — 71045 X-RAY EXAM CHEST 1 VIEW: CPT

## 2021-02-23 PROCEDURE — 94640 AIRWAY INHALATION TREATMENT: CPT

## 2021-02-23 PROCEDURE — 87502 INFLUENZA DNA AMP PROBE: CPT | Performed by: NURSE PRACTITIONER

## 2021-02-23 PROCEDURE — 85025 COMPLETE CBC W/AUTO DIFF WBC: CPT | Performed by: NURSE PRACTITIONER

## 2021-02-23 PROCEDURE — 99284 EMERGENCY DEPT VISIT MOD MDM: CPT

## 2021-02-23 PROCEDURE — 96374 THER/PROPH/DIAG INJ IV PUSH: CPT

## 2021-02-23 RX ORDER — PREDNISONE 20 MG/1
20 TABLET ORAL DAILY
Qty: 5 TABLET | Refills: 0 | Status: SHIPPED | OUTPATIENT
Start: 2021-02-23 | End: 2021-11-30

## 2021-02-23 RX ORDER — ACETAMINOPHEN 500 MG
1000 TABLET ORAL ONCE
Status: COMPLETED | OUTPATIENT
Start: 2021-02-23 | End: 2021-02-23

## 2021-02-23 RX ORDER — BENZONATATE 100 MG/1
100 CAPSULE ORAL 3 TIMES DAILY PRN
COMMUNITY
End: 2021-11-30

## 2021-02-23 RX ORDER — METHYLPREDNISOLONE SODIUM SUCCINATE 125 MG/2ML
125 INJECTION, POWDER, LYOPHILIZED, FOR SOLUTION INTRAMUSCULAR; INTRAVENOUS ONCE
Status: COMPLETED | OUTPATIENT
Start: 2021-02-23 | End: 2021-02-23

## 2021-02-23 RX ORDER — ALBUTEROL SULFATE 90 UG/1
2 AEROSOL, METERED RESPIRATORY (INHALATION) ONCE
Status: COMPLETED | OUTPATIENT
Start: 2021-02-23 | End: 2021-02-23

## 2021-02-23 RX ORDER — IPRATROPIUM BROMIDE AND ALBUTEROL SULFATE 2.5; .5 MG/3ML; MG/3ML
3 SOLUTION RESPIRATORY (INHALATION) ONCE
Status: DISCONTINUED | OUTPATIENT
Start: 2021-02-23 | End: 2021-02-23

## 2021-02-23 RX ADMIN — METHYLPREDNISOLONE SODIUM SUCCINATE 125 MG: 125 INJECTION, POWDER, FOR SOLUTION INTRAMUSCULAR; INTRAVENOUS at 12:24

## 2021-02-23 RX ADMIN — ACETAMINOPHEN 1000 MG: 500 TABLET, FILM COATED ORAL at 14:39

## 2021-02-23 RX ADMIN — ALBUTEROL SULFATE 2 PUFF: 108 AEROSOL, METERED RESPIRATORY (INHALATION) at 12:03

## 2021-11-30 ENCOUNTER — OFFICE VISIT (OUTPATIENT)
Dept: FAMILY MEDICINE CLINIC | Facility: CLINIC | Age: 31
End: 2021-11-30

## 2021-11-30 VITALS
HEART RATE: 84 BPM | TEMPERATURE: 98.2 F | OXYGEN SATURATION: 97 % | SYSTOLIC BLOOD PRESSURE: 119 MMHG | BODY MASS INDEX: 46.77 KG/M2 | DIASTOLIC BLOOD PRESSURE: 84 MMHG | WEIGHT: 291 LBS | HEIGHT: 66 IN

## 2021-11-30 DIAGNOSIS — Z23 ENCOUNTER FOR IMMUNIZATION: ICD-10-CM

## 2021-11-30 DIAGNOSIS — N63.20 LEFT BREAST MASS: ICD-10-CM

## 2021-11-30 DIAGNOSIS — J45.40 MODERATE PERSISTENT ASTHMA WITHOUT COMPLICATION: ICD-10-CM

## 2021-11-30 DIAGNOSIS — Z00.00 ANNUAL PHYSICAL EXAM: ICD-10-CM

## 2021-11-30 DIAGNOSIS — Z12.4 ENCOUNTER FOR PAPANICOLAOU SMEAR FOR CERVICAL CANCER SCREENING: ICD-10-CM

## 2021-11-30 DIAGNOSIS — F31.9 BIPOLAR DEPRESSION (HCC): ICD-10-CM

## 2021-11-30 DIAGNOSIS — E28.2 PCOS (POLYCYSTIC OVARIAN SYNDROME): ICD-10-CM

## 2021-11-30 DIAGNOSIS — E66.01 MORBID (SEVERE) OBESITY DUE TO EXCESS CALORIES (HCC): Primary | ICD-10-CM

## 2021-11-30 PROBLEM — R13.10 DYSPHAGIA: Status: RESOLVED | Noted: 2017-08-18 | Resolved: 2021-11-30

## 2021-11-30 PROCEDURE — 90471 IMMUNIZATION ADMIN: CPT | Performed by: FAMILY MEDICINE

## 2021-11-30 PROCEDURE — 99214 OFFICE O/P EST MOD 30 MIN: CPT | Performed by: FAMILY MEDICINE

## 2021-11-30 PROCEDURE — 90686 IIV4 VACC NO PRSV 0.5 ML IM: CPT | Performed by: FAMILY MEDICINE

## 2021-11-30 PROCEDURE — 99395 PREV VISIT EST AGE 18-39: CPT | Performed by: FAMILY MEDICINE

## 2021-11-30 NOTE — PROGRESS NOTES
Assessment and Plan     Problem List Items Addressed This Visit        Endocrine and Metabolic    Morbid (severe) obesity due to excess calories (HCC) - Primary    Overview     Body mass index is 46.97 kg/m².  Discussed health risks associated with obesity.  Encouraged 150 minutes of exercise weekly.         Relevant Orders    CBC Auto Differential    Comprehensive Metabolic Panel    Lipid Panel    Ambulatory Referral to Nutrition Services    Hemoglobin A1c    PCOS (polycystic ovarian syndrome)    Overview     Discussed pathophysiology.  Will refer to OB-GYN.   Consider OCP for dysmenorrhea.  Consider spironolactone for hirsutism.         Relevant Orders    Ambulatory Referral to Obstetrics / Gynecology (Completed)    CBC Auto Differential    Comprehensive Metabolic Panel    Lipid Panel    Hemoglobin A1c       Mental Health    Bipolar depression (HCC)    Overview     Chronic untreated problem.  Could not tolerate Lamictal due to rash.  Previously treated with Latuda 40 mg and Atarax 10 mg every 6 hours as needed.  Will refer to behavioral health.  Educational material in AVS.         Relevant Orders    CBC Auto Differential    Comprehensive Metabolic Panel    Lipid Panel    Ambulatory Referral to Behavioral Health       Pulmonary and Pneumonias    Moderate persistent asthma without complication    Overview     Exacerbated by body habitus.  Continue Symbicort 160-4.5 mcg per actuation twice daily.  Continue as needed albuterol.  Educational material in AVS.  RTO if symptoms worsen.         Relevant Orders    CBC Auto Differential    Comprehensive Metabolic Panel      Other Visit Diagnoses     Left breast mass        Relevant Orders    Mammo Diagnostic Digital Tomosynthesis Left With CAD    US Breast Left Limited    Encounter for Papanicolaou smear for cervical cancer screening        Relevant Orders    Ambulatory Referral to Obstetrics / Gynecology (Completed)    Encounter for immunization        Relevant Orders     FluLaval/Fluarix/Fluzone >6 Months ([id number]) (Completed)    Annual physical exam        Relevant Orders    CBC Auto Differential    Comprehensive Metabolic Panel    Lipid Panel    Hepatitis C Antibody    Hemoglobin A1c      Encouraged 150 minutes of moderate intensity activity weekly.   Encouraged regular dental visits.   Encouraged regular seat belt use.   Encouraged safe sex practices.   Patient provided with educational material regarding preventive health care in AVS.    Return in about 3 months (around 2/28/2022) for Recheck mood & PCOS.        Patient was given instructions and counseling regarding her condition or for health maintenance advice. Please see specific information pulled into the AVS if appropriate.        Karma is a 31 y.o. being seen today for  Annual Exam (physical. lump in left breast )   Subjective   History of the Present Illness     Morbidly obese  female with a concurrent medical history of asthma and mood disorder presents to establish care and for an annual physical exam.    Diet: generally unhealthy  Exercise: none  Dentist: greater than a year ago    Seatbelt Use: regular    Breast Cancer Screening: NA.   Cervical Cancer Screening: Not up to date. Referral placed.   Colon Cancer Screening: NA.     Normotensive in office.    Patient has a CMHx of bipolar depression, diagnosed in 2016. Patient was followed by behavioral health through virtual visits for the last year. Previously prescribed Latuda that provided some benefit but was not covered by insurance. Could not tolerate Lamictal due to rash. Has had mood swings and cannot sleep since she has been off medication. Will go several days without sleep. Complains of fear of what others may be thinking, makes it hard to leave the house.     Presents with complaints of left mass appreciated during shower a few days ago. Palpation of mass is painful.  Denies any breast discharge.  Denies any personal of breast cancer. Reports a  "family history of breast cancer in maternal aunt and ovarian cancer in maternal cousins. Enjoys caffeninated drinks.     Presents with a CMHx of PCOS and facial hair. Interested in pharmacotherapy.    Social History  She  reports that she has never smoked. She has never used smokeless tobacco. She reports current alcohol use. She reports current drug use. Drug: Marijuana.  Objective   Vital Signs          Vitals:    11/30/21 1055   BP: 119/84   BP Location: Right arm   Patient Position: Sitting   Cuff Size: Large Adult   Pulse: 84   Temp: 98.2 °F (36.8 °C)   TempSrc: Infrared   SpO2: 97%   Weight: 132 kg (291 lb)   Height: 167.6 cm (66\")     BP Readings from Last 1 Encounters:   11/30/21 119/84     Wt Readings from Last 3 Encounters:   11/30/21 132 kg (291 lb)   02/23/21 122 kg (269 lb 13.5 oz)   02/17/21 124 kg (273 lb 13 oz)   Body mass index is 46.97 kg/m².     Physical Exam  Vitals reviewed. Exam conducted with a chaperone present (Gracie Fink MA).   Constitutional:       General: She is not in acute distress.     Appearance: She is well-developed. She is morbidly obese. She is not diaphoretic.   HENT:      Head: Normocephalic and atraumatic.      Right Ear: Tympanic membrane and ear canal normal.      Left Ear: Tympanic membrane and ear canal normal.      Mouth/Throat:      Mouth: Mucous membranes are moist.      Pharynx: Oropharynx is clear.   Eyes:      Extraocular Movements: Extraocular movements intact.      Pupils: Pupils are equal, round, and reactive to light.   Cardiovascular:      Rate and Rhythm: Normal rate and regular rhythm.   Pulmonary:      Effort: Pulmonary effort is normal.      Breath sounds: Normal breath sounds.   Chest:   Breasts:      Left: Mass (pee sized mass of left breast at 11 oclock position) and tenderness present. No nipple discharge.       Abdominal:      General: Bowel sounds are normal.      Palpations: Abdomen is soft.      Tenderness: There is no abdominal tenderness. "   Musculoskeletal:      Right lower leg: No edema.      Left lower leg: No edema.   Skin:     General: Skin is warm and dry.      Comments: Hirsutism   Neurological:      Mental Status: She is alert and oriented to person, place, and time.   Psychiatric:         Mood and Affect: Mood normal.         Behavior: Behavior normal.               Comprehensive Metabolic Panel (02/23/2021 12:24)  CBC & Differential (02/23/2021 12:24)

## 2021-11-30 NOTE — PATIENT INSTRUCTIONS
Mediterranean Diet  A Mediterranean diet refers to food and lifestyle choices that are based on the traditions of countries located on the [place]. This way of eating has been shown to help prevent certain conditions and improve outcomes for people who have chronic diseases, like kidney disease and heart disease.  What are tips for following this plan?  Lifestyle  · Cook and eat meals together with your family, when possible.  · Drink enough fluid to keep your urine clear or pale yellow.  · Be physically active every day. This includes:  ? Aerobic exercise like running or swimming.  ? Leisure activities like gardening, walking, or housework.  · Get 7-8 hours of sleep each night.  · If recommended by your health care provider, drink red wine in moderation. This means 1 glass a day for nonpregnant women and 2 glasses a day for men. A glass of wine equals 5 oz (150 mL).  Reading food labels    · Check the serving size of packaged foods. For foods such as rice and pasta, the serving size refers to the amount of cooked product, not dry.  · Check the total fat in packaged foods. Avoid foods that have saturated fat or trans fats.  · Check the ingredients list for added sugars, such as corn syrup.    Shopping  · At the grocery store, buy most of your food from the areas near the walls of the store. This includes:  ? Fresh fruits and vegetables (produce).  ? Grains, beans, nuts, and seeds. Some of these may be available in unpackaged forms or large amounts (in bulk).  ? Fresh seafood.  ? Poultry and eggs.  ? Low-fat dairy products.  · Buy whole ingredients instead of prepackaged foods.  · Buy fresh fruits and vegetables in-season from local farmers markets.  · Buy frozen fruits and vegetables in resealable bags.  · If you do not have access to quality fresh seafood, buy precooked frozen shrimp or canned fish, such as tuna, salmon, or sardines.  · Buy small amounts of raw or cooked vegetables, salads, or olives from  the deli or salad bar at your store.  · Stock your pantry so you always have certain foods on hand, such as olive oil, canned tuna, canned tomatoes, rice, pasta, and beans.  Cooking  · Cook foods with extra-virgin olive oil instead of using butter or other vegetable oils.  · Have meat as a side dish, and have vegetables or grains as your main dish. This means having meat in small portions or adding small amounts of meat to foods like pasta or stew.  · Use beans or vegetables instead of meat in common dishes like chili or lasagna.  · [place] with different cooking methods. Try roasting or broiling vegetables instead of steaming or sautéeing them.  · Add frozen vegetables to soups, stews, pasta, or rice.  · Add nuts or seeds for added healthy fat at each meal. You can add these to yogurt, salads, or vegetable dishes.  · Marinate fish or vegetables using olive oil, lemon juice, garlic, and fresh herbs.  Meal planning    · Plan to eat 1 vegetarian meal one day each week. Try to work up to 2 vegetarian meals, if possible.  · Eat seafood 2 or more times a week.  · Have healthy snacks readily available, such as:  ? Vegetable sticks with hummus.  ? Greek yogurt.  ? Fruit and nut trail mix.  · Eat balanced meals throughout the week. This includes:  ? Fruit: 2-3 servings a day  ? Vegetables: 4-5 servings a day  ? Low-fat dairy: 2 servings a day  ? Fish, poultry, or lean meat: 1 serving a day  ? Beans and legumes: 2 or more servings a week  ? Nuts and seeds: 1-2 servings a day  ? Whole grains: 6-8 servings a day  ? Extra-virgin olive oil: 3-4 servings a day  · Limit red meat and sweets to only a few servings a month    What are my food choices?  · Mediterranean diet  ? Recommended  § Grains: Whole-grain pasta. Brown rice. Bulgar wheat. Polenta. Couscous. Whole-wheat bread. Oatmeal. Quinoa.  § Vegetables: Artichokes. Beets. Broccoli. Cabbage. Carrots. Eggplant. Green beans. Chard. Kale. Spinach. Onions. Leeks. Peas. Squash.  Tomatoes. Peppers. Radishes.  § Fruits: Apples. Apricots. Avocado. Berries. Bananas. Cherries. Dates. Figs. Grapes. Norma. Melon. Oranges. Peaches. Plums. Pomegranate.  § Meats and other protein foods: Beans. Almonds. Sunflower seeds. Pine nuts. Peanuts. Cod. [place]. Scallops. Shrimp. Tuna. Tilapia. Clams. Oysters. Eggs.  § Dairy: Low-fat milk. Cheese. Greek yogurt.  § Beverages: Water. Red wine. Herbal tea.  § Fats and oils: Extra virgin olive oil. Avocado oil. Grape seed oil.  § Sweets and desserts: Greek yogurt with honey. Baked apples. Poached pears. Trail mix.  § Seasoning and other foods: Basil. Cilantro. Coriander. Cumin. Mint. Parsley. Jerel. Rosemary. Tarragon. Garlic. Oregano. Thyme. Pepper. Balsalmic vinegar. Tahini. Hummus. Tomato sauce. Olives. Mushrooms.  ? Limit these  § Grains: Prepackaged pasta or rice dishes. Prepackaged cereal with added sugar.  § Vegetables: Deep fried potatoes (french fries).  § Fruits: Fruit canned in syrup.  § Meats and other protein foods: Beef. Pork. Lamb. Poultry with skin. Hot dogs. Granados.  § Dairy: Ice cream. Sour cream. Whole milk.  § Beverages: Juice. Sugar-sweetened soft drinks. Beer. Liquor and spirits.  § Fats and oils: Butter. Canola oil. Vegetable oil. Beef fat (tallow). Lard.  § Sweets and desserts: Cookies. Cakes. Pies. Candy.  § Seasoning and other foods: Mayonnaise. Premade sauces and marinades.  The items listed may not be a complete list. Talk with your dietitian about what dietary choices are right for you.  Summary  · The Mediterranean diet includes both food and lifestyle choices.  · Eat a variety of fresh fruits and vegetables, beans, nuts, seeds, and whole grains.  · Limit the amount of red meat and sweets that you eat.  · Talk with your health care provider about whether it is safe for you to drink red wine in moderation. This means 1 glass a day for nonpregnant women and 2 glasses a day for men. A glass of wine equals 5 oz (150 mL).  This information  is not intended to replace advice given to you by your health care provider. Make sure you discuss any questions you have with your health care provider.  Document Revised: 08/17/2017 Document Reviewed: 08/10/2017  [employer] Patient Education © 2020 [employer]      Exercising to Stay Healthy  To become healthy and stay healthy, it is recommended that you do moderate-intensity and vigorous-intensity exercise. You can tell that you are exercising at a moderate intensity if your heart starts beating faster and you start breathing faster but can still hold a conversation. You can tell that you are exercising at a vigorous intensity if you are breathing much harder and faster and cannot hold a conversation while exercising.  Exercising regularly is important. It has many health benefits, such as:  · Improving overall fitness, flexibility, and endurance.  · Increasing bone density.  · Helping with weight control.  · Decreasing body fat.  · Increasing muscle strength.  · Reducing stress and tension.  · Improving overall health.  How often should I exercise?  Choose an activity that you enjoy, and set realistic goals. Your health care provider can help you make an activity plan that works for you.  Exercise regularly as told by your health care provider. This may include:  · Doing strength training two times a week, such as:  ? Lifting weights.  ? Using resistance bands.  ? Push-ups.  ? Sit-ups.  ? Yoga.  · Doing a certain intensity of exercise for a given amount of time. Choose from these options:  ? A total of 150 minutes of moderate-intensity exercise every week.  ? A total of 75 minutes of vigorous-intensity exercise every week.  ? A mix of moderate-intensity and vigorous-intensity exercise every week.  Children, pregnant women, people who have not exercised regularly, people who are overweight, and older adults may need to talk with a health care provider about what activities are safe to do. If you have a medical  condition, be sure to talk with your health care provider before you start a new exercise program.  What are some exercise ideas?  Moderate-intensity exercise ideas include:  · Walking 1 mile (1.6 km) in about 15 minutes.  · Biking.  · Hiking.  · Golfing.  · Dancing.  · Water aerobics.  Vigorous-intensity exercise ideas include:  · Walking 4.5 miles (7.2 km) or more in about 1 hour.  · Jogging or running 5 miles (8 km) in about 1 hour.  · Biking 10 miles (16.1 km) or more in about 1 hour.  · Lap swimming.  · Roller-skating or in-line skating.  · Cross-country skiing.  · Vigorous competitive sports, such as football, basketball, and soccer.  · Jumping rope.  · Aerobic dancing.  What are some everyday activities that can help me to get exercise?  · Yard work, such as:  ? Pushing a .  ? Raking and bagging leaves.  · Washing your car.  · Pushing a stroller.  · Shoveling snow.  · Gardening.  · Washing windows or floors.  How can I be more active in my day-to-day activities?  · Use stairs instead of an elevator.  · Take a walk during your lunch break.  · If you drive, park your car farther away from your work or school.  · If you take public transportation, get off one stop early and walk the rest of the way.  · Stand up or walk around during all of your indoor phone calls.  · Get up, stretch, and walk around every 30 minutes throughout the day.  · Enjoy exercise with a friend. Support to continue exercising will help you keep a regular routine of activity.  What guidelines can I follow while exercising?  · Before you start a new exercise program, talk with your health care provider.  · Do not exercise so much that you hurt yourself, feel dizzy, or get very short of breath.  · Wear comfortable clothes and wear shoes with good support.  · Drink plenty of water while you exercise to prevent dehydration or heat stroke.  · Work out until your breathing and your heartbeat get faster.  Where to find more  information  · [place] Department of Health and Human Services: www.hhs.gov  · Centers for Disease Control and Prevention (CDC): www.cdc.gov  Summary  · Exercising regularly is important. It will improve your overall fitness, flexibility, and endurance.  · Regular exercise also will improve your overall health. It can help you control your weight, reduce stress, and improve your bone density.  · Do not exercise so much that you hurt yourself, feel dizzy, or get very short of breath.  · Before you start a new exercise program, talk with your health care provider.  This information is not intended to replace advice given to you by your health care provider. Make sure you discuss any questions you have with your health care provider.  Document Revised: 11/30/2018 Document Reviewed: 11/08/2018  [employer] Patient Education © 2021 [employer]      Managing Bipolar Disorder  When someone is diagnosed with bipolar disorder, the person may be relieved to now know why he or she has felt or behaved a certain way. The person may also feel overwhelmed about the treatment ahead, how to get needed support, and how to deal with the condition each day. With care and support, a person with bipolar disorder can learn to manage his or her symptoms and live with the condition.  How to manage lifestyle changes  Managing stress  Stress is your body's reaction to life changes and events, both good and bad. Stress can play a major role in bipolar disorder, so it is important to learn how to manage stress. Some techniques to help you manage stress include:  · Meditation, muscle relaxation, and breathing exercises.  · Exercise. Even a short daily walk can help to lower stress levels.  · Getting enough good-quality sleep. Too little sleep can cause selena to start (can trigger selena).  · Making a schedule to manage your time. Knowing your daily schedule can help to keep you from feeling overwhelmed by tasks and deadlines.  · Spending time on  hobbies you enjoy.    Medicines  Your health care provider may suggest certain medicines if he or she feels that they will help improve your condition. Avoid using caffeine, alcohol, and other substances that may prevent your medicines from working properly. It is also important to:  · Talk with your pharmacist or health care provider about all the medicines that you take, their possible side effects, and which medicines are safe to take together.  · Make it your goal to take part in all treatment decisions (shared decision-making). Ask about possible side effects of medicines that your health care provider recommends, and tell him or her how you feel about having those side effects. It is best if shared decision-making with your health care provider is part of your total treatment plan.  If you are taking medicines as part of your treatment, do not stop taking medicines before you ask your health care provider if it is safe to stop. You may need to have the medicine slowly decreased (tapered) over time to lower the risk of harmful side effects.  Relationships  Spend time with people whom you trust and with whom you feel a sense of understanding and calm. Try to find friends or family members who make you feel safe and can help you control feelings of selena. Consider going to couples counseling, family education classes, or family therapy to:  · Educate your loved ones about your condition and offer suggestions about how they can support you.  · Help resolve conflicts.  · Help develop communication skills in your relationships.    How to recognize changes in your condition  Everyone responds differently to treatment for bipolar disorder. Some signs that your condition is improving include:  · Leveling of your mood. You may have less anger and excitement about daily activities, and your low moods may not be as bad.  · Your symptoms being less intense.  · Feeling calm more often.  · Thinking clearly.  · Not  experiencing consequences for extreme behavior.  · Feeling like your life is settling down.  · Your behavior seeming more normal to you and to other people.  Some signs that your condition may be getting worse include:  · Sleep problems.  · Moods cycling between deep lows and unusually high (excess) energy.  · Extreme emotions.  · More anger at loved ones.  · Staying away from others, or isolating yourself.  · A feeling of power or superiority.  · Completing a lot of tasks in a very short amount of time.  · Unusual thoughts and behaviors.  · Suicidal thoughts.  Follow these instructions at home:  Medicines  · Take over-the-counter and prescription medicines only as told by your health care provider or pharmacist.  · Ask your pharmacist what over-the-counter cold medicines you should avoid. Some medicines can make symptoms worse.  General instructions  · Ask for support from trusted family members or friends to make sure you stay on track with your treatment.  · Keep a journal to write down your daily moods, medicines, sleep habits, and life events. This may help you have more success with your treatment.  · Make and follow a routine for daily meal times. Eat healthy foods, such as whole grains, vegetables, and fresh fruit.  · Try to go to sleep and wake up around the same time every day.  · Keep all follow-up visits as told by your health care provider. This is important.  Where to find support  Talking to others  · Try making a list of the people you may want to tell about your condition, such as the people you trust most.  · Plan what you are willing and not willing to talk about. Think about your needs ahead of time, and how your friends and family members can support you.  · Let your loved ones know when they can share advice and when you would just like them to listen.  · Give your loved ones information about bipolar disorder, and encourage them to learn about the condition.  Finances  Not all insurance plans  cover mental health care, so it is important to check with your insurance carrier. If paying for co-pays or counseling services is a problem, search for a local or [facility] mental health care center. Public mental health care services may be offered there at a low cost or no cost when you are not able to see a private health care provider.  If you are taking medicine for depression, you may be able to get the generic form, which may be less expensive than brand-name medicine. Some makers of prescription medicines also offer help to patients who cannot afford the medicines they need.  Questions to ask your health care provider:  · If you are taking medicines:  ? How long do I need to take medicine?  ? Are there any long-term side effects of my medicine?  ? Are there any alternatives to taking medicine?  · How would I benefit from therapy?  · How often should I follow up with a health care provider?  Contact a health care provider if:  · Your symptoms get worse or they do not get better with treatment.  Get help right away if:  · You have thoughts about harming yourself or others.  If you ever feel like you may hurt yourself or others, or have thoughts about taking your own life, get help right away. You can go to your nearest emergency department or call:  · Your local emergency services (911 in the [place]  · A suicide crisis helpline, such as the National Suicide Prevention Lifeline at 1-847.420.8681. This is open 24-hours a day.  Summary  · Learning ways to manage stress can help to calm you and may also help your treatment work better.  · There is a wide range of medicines that can help to treat bipolar disorder.  · Having healthy relationships can help to make your moods more stable.  · Contact a health care provider if your symptoms get worse or they do not get better with treatment.  This information is not intended to replace advice given to you by your health care provider. Make sure you discuss any questions  you have with your health care provider.  Document Revised: 06/02/2021 Document Reviewed: 06/02/2021  [employer] Patient Education © 2021 [employer]

## 2021-12-03 ENCOUNTER — PATIENT ROUNDING (BHMG ONLY) (OUTPATIENT)
Dept: FAMILY MEDICINE CLINIC | Facility: CLINIC | Age: 31
End: 2021-12-03

## 2021-12-03 NOTE — PROGRESS NOTES
December 3, 2021    Hello, may I speak with Karma Steen?    My name is Shelly, Practice Manager      I am  with [facility]  [facility]    [place] [address].    Before we get started may I verify your date of birth? 1990    I am calling to officially welcome you to our practice and ask about your recent visit. Is this a good time to talk? yes    Tell me about your visit with us. What things went well?  Dr. New was so nice and really listened to me!       We're always looking for ways to make our patients' experiences even better. Do you have recommendations on ways we may improve?  no    Overall were you satisfied with your first visit to our practice? yes       I appreciate you taking the time to speak with me today. Is there anything else I can do for you? no      Thank you, and have a great day.

## 2021-12-16 ENCOUNTER — LAB (OUTPATIENT)
Dept: FAMILY MEDICINE CLINIC | Facility: CLINIC | Age: 31
End: 2021-12-16

## 2021-12-16 ENCOUNTER — HOSPITAL ENCOUNTER (OUTPATIENT)
Dept: MAMMOGRAPHY | Facility: HOSPITAL | Age: 31
Discharge: HOME | End: 2021-12-16

## 2021-12-16 ENCOUNTER — HOSPITAL ENCOUNTER (OUTPATIENT)
Dept: ULTRASOUND IMAGING | Facility: HOSPITAL | Age: 31
Discharge: HOME | End: 2021-12-16

## 2021-12-16 DIAGNOSIS — N63.20 LEFT BREAST MASS: ICD-10-CM

## 2021-12-16 LAB
ALBUMIN SERPL-MCNC: 4 G/DL (ref 3.5–5.2)
ALBUMIN/GLOB SERPL: 1.3 G/DL
ALP SERPL-CCNC: 86 U/L (ref 39–117)
ALT SERPL W P-5'-P-CCNC: 8 U/L (ref 1–33)
ANION GAP SERPL CALCULATED.3IONS-SCNC: 6.7 MMOL/L (ref 5–15)
AST SERPL-CCNC: 11 U/L (ref 1–32)
BASOPHILS # BLD AUTO: 0.09 10*3/MM3 (ref 0–0.2)
BASOPHILS NFR BLD AUTO: 0.7 % (ref 0–1.5)
BILIRUB SERPL-MCNC: 0.4 MG/DL (ref 0–1.2)
BUN SERPL-MCNC: 10 MG/DL (ref 6–20)
BUN/CREAT SERPL: 11 (ref 7–25)
CALCIUM SPEC-SCNC: 8.7 MG/DL (ref 8.6–10.5)
CHLORIDE SERPL-SCNC: 104 MMOL/L (ref 98–107)
CHOLEST SERPL-MCNC: 137 MG/DL (ref 0–200)
CO2 SERPL-SCNC: 27.3 MMOL/L (ref 22–29)
CREAT SERPL-MCNC: 0.91 MG/DL (ref 0.57–1)
DEPRECATED RDW RBC AUTO: 40.9 FL (ref 37–54)
EOSINOPHIL # BLD AUTO: 0.73 10*3/MM3 (ref 0–0.4)
EOSINOPHIL NFR BLD AUTO: 5.9 % (ref 0.3–6.2)
ERYTHROCYTE [DISTWIDTH] IN BLOOD BY AUTOMATED COUNT: 13.1 % (ref 12.3–15.4)
GFR SERPL CREATININE-BSD FRML MDRD: 72 ML/MIN/1.73
GLOBULIN UR ELPH-MCNC: 3.1 GM/DL
GLUCOSE SERPL-MCNC: 91 MG/DL (ref 65–99)
HBA1C MFR BLD: 5.5 % (ref 3.5–5.6)
HCT VFR BLD AUTO: 39 % (ref 34–46.6)
HCV AB SER DONR QL: NORMAL
HDLC SERPL-MCNC: 53 MG/DL (ref 40–60)
HGB BLD-MCNC: 13.1 G/DL (ref 12–15.9)
IMM GRANULOCYTES # BLD AUTO: 0.06 10*3/MM3 (ref 0–0.05)
IMM GRANULOCYTES NFR BLD AUTO: 0.5 % (ref 0–0.5)
LDLC SERPL CALC-MCNC: 73 MG/DL (ref 0–100)
LDLC/HDLC SERPL: 1.41 {RATIO}
LYMPHOCYTES # BLD AUTO: 4.31 10*3/MM3 (ref 0.7–3.1)
LYMPHOCYTES NFR BLD AUTO: 35.1 % (ref 19.6–45.3)
MCH RBC QN AUTO: 29 PG (ref 26.6–33)
MCHC RBC AUTO-ENTMCNC: 33.6 G/DL (ref 31.5–35.7)
MCV RBC AUTO: 86.3 FL (ref 79–97)
MONOCYTES # BLD AUTO: 0.84 10*3/MM3 (ref 0.1–0.9)
MONOCYTES NFR BLD AUTO: 6.8 % (ref 5–12)
NEUTROPHILS NFR BLD AUTO: 51 % (ref 42.7–76)
NEUTROPHILS NFR BLD AUTO: 6.24 10*3/MM3 (ref 1.7–7)
NRBC BLD AUTO-RTO: 0 /100 WBC (ref 0–0.2)
PLATELET # BLD AUTO: 266 10*3/MM3 (ref 140–450)
PMV BLD AUTO: 11.5 FL (ref 6–12)
POTASSIUM SERPL-SCNC: 4.3 MMOL/L (ref 3.5–5.2)
PROT SERPL-MCNC: 7.1 G/DL (ref 6–8.5)
RBC # BLD AUTO: 4.52 10*6/MM3 (ref 3.77–5.28)
SODIUM SERPL-SCNC: 138 MMOL/L (ref 136–145)
TRIGL SERPL-MCNC: 47 MG/DL (ref 0–150)
VLDLC SERPL-MCNC: 11 MG/DL (ref 5–40)
WBC NRBC COR # BLD: 12.27 10*3/MM3 (ref 3.4–10.8)

## 2021-12-16 PROCEDURE — G0279 TOMOSYNTHESIS, MAMMO: HCPCS

## 2021-12-16 PROCEDURE — 86803 HEPATITIS C AB TEST: CPT | Performed by: FAMILY MEDICINE

## 2021-12-16 PROCEDURE — 77066 DX MAMMO INCL CAD BI: CPT

## 2021-12-16 PROCEDURE — 80053 COMPREHEN METABOLIC PANEL: CPT | Performed by: FAMILY MEDICINE

## 2021-12-16 PROCEDURE — 83036 HEMOGLOBIN GLYCOSYLATED A1C: CPT | Performed by: FAMILY MEDICINE

## 2021-12-16 PROCEDURE — 85025 COMPLETE CBC W/AUTO DIFF WBC: CPT | Performed by: FAMILY MEDICINE

## 2021-12-16 PROCEDURE — 80061 LIPID PANEL: CPT | Performed by: FAMILY MEDICINE

## 2021-12-16 PROCEDURE — 36415 COLL VENOUS BLD VENIPUNCTURE: CPT | Performed by: FAMILY MEDICINE

## 2021-12-16 PROCEDURE — 76642 ULTRASOUND BREAST LIMITED: CPT

## 2021-12-22 DIAGNOSIS — E28.2 PCOS (POLYCYSTIC OVARIAN SYNDROME): Primary | ICD-10-CM

## 2021-12-22 DIAGNOSIS — L68.0 HIRSUTISM: ICD-10-CM

## 2021-12-22 RX ORDER — SPIRONOLACTONE 50 MG/1
50 TABLET, FILM COATED ORAL DAILY
Qty: 90 TABLET | Refills: 1 | Status: SHIPPED | OUTPATIENT
Start: 2021-12-22 | End: 2022-08-24 | Stop reason: SDUPTHER

## 2022-01-02 PROBLEM — Z20.822 EXPOSURE TO COVID-19 VIRUS: Status: ACTIVE | Noted: 2022-01-02

## 2022-01-02 PROCEDURE — U0004 COV-19 TEST NON-CDC HGH THRU: HCPCS | Performed by: NURSE PRACTITIONER

## 2022-02-08 ENCOUNTER — TELEPHONE (OUTPATIENT)
Dept: FAMILY MEDICINE CLINIC | Facility: CLINIC | Age: 32
End: 2022-02-08

## 2022-02-08 NOTE — TELEPHONE ENCOUNTER
Medication requested (name and dose): LEXAPRO  (NOT ON FILE)    Pharmacy where request should be sent: [facility] - [place], [place] - [address] - [phone] [facility] [phone]      Additional details provided by patient: PT STATED THAT SHE USED TO BE ON IT A WHILE BACK AND THAT MD BRASHER TOLD HER THAT SHE WOULD PLACE HER BACK ON IT IF NEEDED     Best call back number: [phone]    Does the patient have less than a 3 day supply:  [x] Yes  [] No    Mariaelena Becerra  02/08/22, 13:38 EST

## 2022-02-10 RX ORDER — ESCITALOPRAM OXALATE 10 MG/1
10 TABLET ORAL DAILY
Qty: 30 TABLET | Refills: 0 | Status: SHIPPED | OUTPATIENT
Start: 2022-02-10 | End: 2022-03-15 | Stop reason: SDUPTHER

## 2022-02-10 NOTE — TELEPHONE ENCOUNTER
Please call patient and let her know that I had referred her to behavioral health/psychiatry for further evaluation and management of her bipolar depression.  Unfortunately Lexapro alone may not be sufficient in managing her symptoms.  Did patient ever establish care with behavioral health?    Can we connect her with [facility] or give her the phone number to behavioral health specialist if patient has not yet established care?

## 2022-02-10 NOTE — TELEPHONE ENCOUNTER
Sees them end of this month. At her last appt she discussed you prescribing until she can get in with them.

## 2022-02-10 NOTE — TELEPHONE ENCOUNTER
Please call patient and let her know that I will send a prescription to her pharmacy to cover her until she can be seen by behavioral health.  I recommend keeping her appointment since Lexapro may not be sufficient in managing her symptoms.

## 2022-02-21 ENCOUNTER — TELEPHONE (OUTPATIENT)
Dept: FAMILY MEDICINE CLINIC | Facility: CLINIC | Age: 32
End: 2022-02-21

## 2022-02-21 NOTE — TELEPHONE ENCOUNTER
PATIENT STATES: THAT HER BEHAVIOR HEALTH WAS CANCELLED BY THE OFFICE AND NEEDS ANOTHER APPT PLEASE ADVISE     PATIENT CAN BE REACHED ON: [phone]

## 2022-02-21 NOTE — TELEPHONE ENCOUNTER
Called pt to let know she need to call that office and that's not even what she need our office so I was able to clear things up with pt

## 2022-03-01 ENCOUNTER — APPOINTMENT (OUTPATIENT)
Dept: GENERAL RADIOLOGY | Facility: HOSPITAL | Age: 32
End: 2022-03-01

## 2022-03-01 ENCOUNTER — HOSPITAL ENCOUNTER (EMERGENCY)
Facility: HOSPITAL | Age: 32
Discharge: HOME | End: 2022-03-01
Attending: EMERGENCY MEDICINE | Admitting: EMERGENCY MEDICINE

## 2022-03-01 VITALS
HEIGHT: 64 IN | TEMPERATURE: 98.4 F | WEIGHT: 288.8 LBS | OXYGEN SATURATION: 95 % | HEART RATE: 74 BPM | RESPIRATION RATE: 16 BRPM | SYSTOLIC BLOOD PRESSURE: 103 MMHG | DIASTOLIC BLOOD PRESSURE: 69 MMHG | BODY MASS INDEX: 49.31 KG/M2

## 2022-03-01 DIAGNOSIS — S29.019A STRAIN OF THORACIC REGION, INITIAL ENCOUNTER: Primary | ICD-10-CM

## 2022-03-01 PROCEDURE — 71101 X-RAY EXAM UNILAT RIBS/CHEST: CPT

## 2022-03-01 PROCEDURE — 99283 EMERGENCY DEPT VISIT LOW MDM: CPT

## 2022-03-01 RX ORDER — METHOCARBAMOL 500 MG/1
500 TABLET, FILM COATED ORAL 4 TIMES DAILY
Qty: 12 TABLET | Refills: 0 | Status: SHIPPED | OUTPATIENT
Start: 2022-03-01 | End: 2022-05-24

## 2022-03-01 RX ORDER — HYDROCODONE BITARTRATE AND ACETAMINOPHEN 5; 325 MG/1; MG/1
1 TABLET ORAL ONCE AS NEEDED
Status: COMPLETED | OUTPATIENT
Start: 2022-03-01 | End: 2022-03-01

## 2022-03-01 RX ORDER — NAPROXEN 500 MG/1
500 TABLET ORAL 2 TIMES DAILY WITH MEALS
Qty: 14 TABLET | Refills: 0 | Status: SHIPPED | OUTPATIENT
Start: 2022-03-01 | End: 2022-03-08

## 2022-03-01 RX ORDER — MULTIVIT WITH MINERALS/LUTEIN
500 TABLET ORAL DAILY
COMMUNITY
End: 2022-07-05

## 2022-03-01 RX ADMIN — HYDROCODONE BITARTRATE AND ACETAMINOPHEN 1 TABLET: 5; 325 TABLET ORAL at 10:28

## 2022-03-01 NOTE — ED PROVIDER NOTES
Subjective   Patient is a 31-year-old obese white female with history of PCOS and asthma who presents today with complaints of sudden onset of pain right posterior thorax.  She states she bent over in the tub and turned.  She states that she twisted she had a sudden onset of sharp pain in the right posterior thorax and ribs.  She states her pain is worse when she moves or takes a deep breath.  She denies any shortness of breath.  She denies any radiation of pain into her chest.  She denies any numbness tingling or weakness in her extremities.          Review of Systems   Constitutional: Negative for chills and fever.   HENT: Negative for congestion.    Respiratory: Negative for cough and shortness of breath.    Cardiovascular: Negative for chest pain.   Gastrointestinal: Negative for abdominal pain, nausea and vomiting.   Musculoskeletal: Negative for neck pain.        Right sided posterior thoracic pain   Skin: Negative for rash.   Neurological: Negative for weakness and numbness.       Past Medical History:   Diagnosis Date   • Anxiety    • Asthma    • Exposure to COVID-19 virus 01/02/2022   • Migraine    • MRSA (methicillin resistant Staphylococcus aureus)    • PCOS (polycystic ovarian syndrome)    • PE (pulmonary thromboembolism) ([facility]) 2019   • Staph infection    • Tachycardia        Allergies   Allergen Reactions   • Amoxicillin-Pot Clavulanate Nausea And Vomiting   • Cefaclor Other (See Comments)     BRUISING     • Cefixime Nausea And Vomiting and Shortness Of Breath   • Cefprozil Shortness Of Breath   • Cefuroxime Axetil Other (See Comments)   • Cephalexin Shortness Of Breath   • Cephalosporins Shortness Of Breath   • Latex Shortness Of Breath   • Other Other (See Comments)     Horses,rats,rabbits,guinea pig,trees,pollen,mold  Scratchy throat,hives,welps,swollen and red eyes   • Lamictal [Lamotrigine] Rash   • Promethazine Hcl Other (See Comments)     LILIAM BLANKENSHIP         Past Surgical History:   Procedure  Laterality Date   • ADENOIDECTOMY     • TONSILLECTOMY         Family History   Problem Relation Age of Onset   • Heart disease Mother    • Migraines Mother    • Heart disease Father    • Breast cancer Maternal Aunt    • Diabetes Paternal Grandmother    • Hypertension Paternal Grandmother        Social History     Socioeconomic History   • Marital status:    Tobacco Use   • Smoking status: Never Smoker   • Smokeless tobacco: Never Used   Vaping Use   • Vaping Use: Never used   Substance and Sexual Activity   • Alcohol use: Yes     Comment: very rare   • Drug use: Yes     Types: Marijuana   • Sexual activity: Yes     Partners: Male           Objective   Physical Exam  Vital signs and triage nurse note reviewed.  Constitutional: Awake, alert; well-developed and well-nourished. No acute distress is noted.  Obese.  Neck: Supple, full range of motion without pain; no cervical lymphadenopathy. Normal phonation.  Cardiovascular: Regular rate and rhythm, normal S1-S2.  No murmur noted.  Pulmonary: Respiratory effort regular nonlabored, breath sounds clear to auscultation all fields.  Abdomen: Soft, nontender, nondistended with normoactive bowel sounds; no rebound or guarding.  Musculoskeletal: Independent range of motion of all extremities with no palpable tenderness or edema.  There is tenderness over the right posterior and lateral thorax.  There is no crepitus or subcu emphysema noted.  No erythema or ecchymosis.  There is good cap refill, strength and sensation in the right upper extremity.  Neuro: Alert oriented x3, speech is clear and appropriate, GCS 15. Skin: Flesh tone, warm, dry, intact; no erythematous or petechial rash or lesion.      Procedures           ED Course      Labs Reviewed - No data to display  XR Ribs Right With PA Chest    Result Date: 3/1/2022  Negative radiographs of the chest and right-sided ribs.  Electronically Signed By-Saul Reeves On:3/1/2022 10:36 AM This report was finalized on  [id number] by  Saul Reeves, .    Medications   HYDROcodone-acetaminophen (NORCO) 5-325 MG per tablet 1 tablet (1 tablet Oral Given 3/1/22 1028)                                                MDM  Number of Diagnoses or Management Options  Strain of thoracic region, initial encounter  Diagnosis management comments: Comorbidities: PCOS, asthma  Differentials: Fracture, strain;this list is not all inclusive and does not constitute the entirety of considered causes  Discussion with provider:  Radiology interpretation: X-rays reviewed by me and interpreted by radiologist: As above  Lab interpretation: Labs viewed by me significant for:     Patient had x-rays obtained.  She was given a Norco for pain.    Work-up: Right rib x-rays with PA chest show no acute abnormality.    On reexamination the patient is resting comfortably and in no distress.  She has no new complaints.  She is hemodynamically stable.  She is well-appearing and has stable vital signs.  She is ambulatory without difficulty.    Diagnosis and treatment plan discussed with patient.  Patient agreeable to plan.   I discussed findings with patient who voices understanding of discharge instructions, signs and symptoms requiring return to ED; discharged improved and in stable condition with follow up for re-evaluation.  Prescription for Naprosyn and Robaxin.       Amount and/or Complexity of Data Reviewed  Tests in the radiology section of CPT®: ordered and reviewed    Patient Progress  Patient progress: stable      Final diagnoses:   Strain of thoracic region, initial encounter       ED Disposition  ED Disposition     ED Disposition Condition Comment    Discharge Stable           Ling New MD  [facility] [facility]  [address]  [place] [address]  [phone]      As needed         Medication List      New Prescriptions    methocarbamol 500 MG tablet  Commonly known as: ROBAXIN  Take 1 tablet by mouth 4 (Four) Times a Day.     naproxen 500 MG EC  tablet  Commonly known as: EC NAPROSYN  Take 1 tablet by mouth 2 (Two) Times a Day With Meals for 7 days.           Where to Get Your Medications      These medications were sent to [facility] [place] [place] - [address] - [phone]  - [phone] FX  [address] [place] [address]    Phone: [phone]   · methocarbamol 500 MG tablet  · naproxen 500 MG EC tablet          Sandie Graves, APRN  03/01/22 1132

## 2022-03-01 NOTE — ED NOTES
Pt reports she was in the shower this morning when she had her foot up on the side of the tub and she turned and had a instant sharp pain. Pt rates pain 10/10. Pt presents with R rib pain with swelling.      Bonita Boyd RN  03/01/22 0994

## 2022-03-01 NOTE — DISCHARGE INSTRUCTIONS
Take medications as prescribed.  Rest next few days.  No heavy lifting pulling or straining with your right arm the next 3 to 5 days.  Use a heating pad.  Gentle stretches.  Follow-up with primary care provider as needed.  Return for new or worsening symptoms.

## 2022-03-08 DIAGNOSIS — J45.40 MODERATE PERSISTENT ASTHMA WITHOUT COMPLICATION: Primary | ICD-10-CM

## 2022-03-08 DIAGNOSIS — Z20.822 SUSPECTED COVID-19 VIRUS INFECTION: ICD-10-CM

## 2022-03-08 NOTE — TELEPHONE ENCOUNTER
Caller: Karma Steen    Relationship: Self    Best call back number: [phone]    Requested Prescriptions:   Requested Prescriptions     Pending Prescriptions Disp Refills   • budesonide-formoterol (SYMBICORT) 160-4.5 MCG/ACT inhaler 1 each 0     Sig: Inhale 2 puffs 2 (Two) Times a Day.   • albuterol sulfate  (90 Base) MCG/ACT inhaler 18 g 0     Sig: Inhale 2 puffs Every 4 (Four) Hours As Needed for Wheezing.        Pharmacy where request should be sent: [facility] [place] - [address] [place] [address] - [phone] [facility] [phone]      Additional details provided by patient: OUT OF MEDICATION     Does the patient have less than a 3 day supply:  [x] Yes  [] No    Nathanael Diaz Rep   03/08/22 14:34 EST

## 2022-03-09 RX ORDER — BUDESONIDE AND FORMOTEROL FUMARATE DIHYDRATE 160; 4.5 UG/1; UG/1
2 AEROSOL RESPIRATORY (INHALATION)
Qty: 10.2 EACH | Refills: 5 | Status: SHIPPED | OUTPATIENT
Start: 2022-03-09 | End: 2022-05-24 | Stop reason: SDUPTHER

## 2022-03-09 RX ORDER — ALBUTEROL SULFATE 90 UG/1
2 AEROSOL, METERED RESPIRATORY (INHALATION) EVERY 6 HOURS PRN
Qty: 18 G | Refills: 5 | Status: SHIPPED | OUTPATIENT
Start: 2022-03-09 | End: 2022-06-24 | Stop reason: SDUPTHER

## 2022-03-15 RX ORDER — ESCITALOPRAM OXALATE 10 MG/1
10 TABLET ORAL DAILY
Qty: 30 TABLET | Refills: 0 | Status: SHIPPED | OUTPATIENT
Start: 2022-03-15 | End: 2022-04-26

## 2022-03-15 NOTE — TELEPHONE ENCOUNTER
Caller: Karma Steen    Relationship: Self    Best call back number:[phone]      Requested Prescriptions:   Requested Prescriptions     Pending Prescriptions Disp Refills   • escitalopram (Lexapro) 10 MG tablet 30 tablet 0     Sig: Take 1 tablet by mouth Daily.        Pharmacy where request should be sent: [facility] [place] - [address] - [phone]  - [phone]     Additional details provided by patient: PATIENT HAS APPT COMING WITH [facility] COM[facility] ON 03/25/22 BUT SHE IS OUT OF THIS MEDICATION AS OF TODAY ASKING TO GET ENOUGH TO GET HER TO APPT.    Does the patient have less than a 3 day supply:  [x] Yes  [] No    Jeannine Enamorado, PCT   03/15/22 11:57 EDT

## 2022-04-11 ENCOUNTER — HOSPITAL ENCOUNTER (EMERGENCY)
Facility: HOSPITAL | Age: 32
Discharge: HOME | End: 2022-04-12
Attending: EMERGENCY MEDICINE | Admitting: EMERGENCY MEDICINE

## 2022-04-11 ENCOUNTER — APPOINTMENT (OUTPATIENT)
Dept: GENERAL RADIOLOGY | Facility: HOSPITAL | Age: 32
End: 2022-04-11

## 2022-04-11 DIAGNOSIS — S60.211A CONTUSION OF RIGHT WRIST, INITIAL ENCOUNTER: ICD-10-CM

## 2022-04-11 DIAGNOSIS — M79.601 RIGHT ARM PAIN: Primary | ICD-10-CM

## 2022-04-11 PROCEDURE — 73110 X-RAY EXAM OF WRIST: CPT

## 2022-04-11 PROCEDURE — 99283 EMERGENCY DEPT VISIT LOW MDM: CPT

## 2022-04-11 RX ORDER — HYDROMORPHONE HCL 110MG/55ML
0.5 PATIENT CONTROLLED ANALGESIA SYRINGE INTRAVENOUS ONCE
Status: DISCONTINUED | OUTPATIENT
Start: 2022-04-11 | End: 2022-04-11

## 2022-04-11 RX ADMIN — IBUPROFEN 600 MG: 400 TABLET ORAL at 23:30

## 2022-04-12 ENCOUNTER — TELEPHONE (OUTPATIENT)
Dept: FAMILY MEDICINE CLINIC | Facility: CLINIC | Age: 32
End: 2022-04-12

## 2022-04-12 VITALS
HEART RATE: 77 BPM | BODY MASS INDEX: 49.98 KG/M2 | OXYGEN SATURATION: 99 % | DIASTOLIC BLOOD PRESSURE: 73 MMHG | HEIGHT: 64 IN | WEIGHT: 292.77 LBS | SYSTOLIC BLOOD PRESSURE: 157 MMHG | RESPIRATION RATE: 18 BRPM | TEMPERATURE: 98 F

## 2022-04-12 NOTE — DISCHARGE INSTRUCTIONS
Ice to area 3-4 times per day, 15-20 minutes at a time, do not use while sleeping or for extended periods of time  Elevate extremity throughout the day  Return the ED for new or worsening symptoms  May use over-the-counter pain reliever of choice  Follow-up with your primary care provider, call tomorrow for an appointment

## 2022-04-12 NOTE — TELEPHONE ENCOUNTER
Caller: Karma Steen    Relationship to patient: Self    Best call back number: [phone]    Patient is needing: PATIENT IS NEEDING A HOSPITAL FOLLOW UP APPOINTMENT WITH DR. BRASHER. PATIENT WENT TO [facility] LAST NIGHT 4-11-22 FOR A RIGHT ARM INJURY. PATIENT ALSO STATES HER BP WAS ELEVATED AS WELL. NO AVAILABILITY WITHIN 7 DAYS. PLEASE ADVISE.

## 2022-04-12 NOTE — ED NOTES
Pt states that contents on a shelf fell on to her right arm this morning. Pain is progressively getting worse. Pt is alert and oriented.

## 2022-04-12 NOTE — ED PROVIDER NOTES
Subjective    Chief Complaint   Patient presents with   • Arm Pain     Ling New MD  Patient's last menstrual period was 03/16/2022.  Allergies   Allergen Reactions   • Amoxicillin-Pot Clavulanate Nausea And Vomiting   • Cefaclor Other (See Comments)     BRUISING     • Cefixime Nausea And Vomiting and Shortness Of Breath   • Cefprozil Shortness Of Breath   • Cefuroxime Axetil Other (See Comments)   • Cephalexin Shortness Of Breath   • Cephalosporins Shortness Of Breath   • Latex Shortness Of Breath   • Other Other (See Comments)     Horses,rats,rabbits,guinea pig,trees,pollen,mold  Scratchy throat,hives,welps,swollen and red eyes   • Lamictal [Lamotrigine] Rash   • Promethazine Hcl Other (See Comments)     SHAKE, ANXIOUS         Patient is a 32-year-old female presents the ED after an injury to her right arm.  She reports this occurred today, she reports items fell from a shelf hitting her right lower arm.  Denies any other injury.  She reports pain does shoot up her right arm.  No numbness or tingling.  No change in color with the exception of bruising.  Patient is not currently anticoagulated.      Onset: Today    Location: Right wrist    Duration: Consistent    Character: Throbbing and burning    Aggravating & Alleviating Factors: None    Radiation: Upper arm    Treatments Tried: None            Review of Systems   Constitutional: Negative for chills and fever.   Musculoskeletal:        Right wrist pain     Skin: Positive for color change.       Past Medical History:   Diagnosis Date   • Anxiety    • Asthma    • Exposure to COVID-19 virus 01/02/2022   • Migraine    • MRSA (methicillin resistant Staphylococcus aureus)    • PCOS (polycystic ovarian syndrome)    • PE (pulmonary thromboembolism) ([facility]) 2019   • Staph infection    • Tachycardia        Allergies   Allergen Reactions   • Amoxicillin-Pot Clavulanate Nausea And Vomiting   • Cefaclor Other (See Comments)     BRUISING     • Cefixime Nausea And  Vomiting and Shortness Of Breath   • Cefprozil Shortness Of Breath   • Cefuroxime Axetil Other (See Comments)   • Cephalexin Shortness Of Breath   • Cephalosporins Shortness Of Breath   • Latex Shortness Of Breath   • Other Other (See Comments)     Horses,rats,rabbits,guinea pig,trees,pollen,mold  Scratchy throat,hives,welps,swollen and red eyes   • Lamictal [Lamotrigine] Rash   • Promethazine Hcl Other (See Comments)     SHAKE, ANXIOUS         Past Surgical History:   Procedure Laterality Date   • ADENOIDECTOMY     • TONSILLECTOMY         Family History   Problem Relation Age of Onset   • Heart disease Mother    • Migraines Mother    • Heart disease Father    • Breast cancer Maternal Aunt    • Diabetes Paternal Grandmother    • Hypertension Paternal Grandmother        Social History     Socioeconomic History   • Marital status:    Tobacco Use   • Smoking status: Never Smoker   • Smokeless tobacco: Never Used   Vaping Use   • Vaping Use: Never used   Substance and Sexual Activity   • Alcohol use: Yes     Comment: very rare   • Drug use: Yes     Types: Marijuana   • Sexual activity: Yes     Partners: Male           Objective   Physical Exam  Vitals and nursing note reviewed.   Constitutional:       General: She is not in acute distress.     Appearance: Normal appearance. She is not ill-appearing, toxic-appearing or diaphoretic.   HENT:      Head: Normocephalic and atraumatic.   Cardiovascular:      Rate and Rhythm: Normal rate and regular rhythm.      Pulses:           Radial pulses are 2+ on the right side and 2+ on the left side.      Heart sounds: Normal heart sounds.   Pulmonary:      Effort: Pulmonary effort is normal.      Breath sounds: Normal breath sounds.   Musculoskeletal:      Right wrist: Swelling present.      Comments: Small contusion noted to radial aspect of the right wrist.  Compartment are soft.  No pallor.  No numbness or tingling.  No pulse 2+.   Skin:     General: Skin is warm and dry.     "  Capillary Refill: Capillary refill takes less than 2 seconds.   Neurological:      Mental Status: She is alert and oriented to person, place, and time.   Psychiatric:         Mood and Affect: Mood normal.         Behavior: Behavior normal.         Procedures           ED Course           /73   Pulse 77   Temp 98 °F (36.7 °C) (Oral)   Resp 18   Ht 162.6 cm (64\")   Wt 133 kg (292 lb 12.3 oz)   LMP 03/16/2022   SpO2 99%   BMI 50.25 kg/m²   Labs Reviewed - No data to display  Medications   ibuprofen (ADVIL,MOTRIN) tablet 600 mg (600 mg Oral Given 4/11/22 2330)     No radiology results for the last day       XR Wrist 3+ View Right   ED Interpretation   Per Dr. Alba, no acute findings.                                          MDM  Appropriate PPE was worn during the duration of the care for this patient while in the emergency department per [facility] Policy    Differentials: Contusion, fracture, sprain  This list is not all inclusive and does not constitute the entireity of considered causes.     Patient was brought back to the emergency department room for evaluation and placed on appropriate monitoring.        ED Course, Workup, Summary and Disposition: Patient of the above exam and work-up for injury to right wrist.  She is visible contusion noted.  Neurovascular intact.  X-ray reveals no acute findings, interpreted per ED physician, Dr. Alba, viewed by me.  Patient will be placed in Ace wrap, advised to use over-the-counter pain medication and follow-up with PCP.    I spoke with the patient at the bedside regarding their plan of care, discharge instruction, home care, prescriptions, indications to return to the emergency department, and importance follow-up.  We discussed test results at the bedside, including incidental abnormal labs, radiological findings, understands need for follow-up with primary care or specialist if indicated.     Pt is aware that discharge does not mean that nothing is " wrong but it indicates no emergency is present and they must continue care with follow-up as given below or physician of their choice                                Final diagnoses:   Right arm pain   Contusion of right wrist, initial encounter       ED Disposition  ED Disposition     ED Disposition   Discharge    Condition   Stable    Comment   --             [facility] EMERGENCY DEPARTMENT  [address] [address] [phone]    As needed, If symptoms worsen    Ling New MD  [address]  [address]  [place] [address] [phone]    Schedule an appointment as soon as possible for a visit            Medication List      No changes were made to your prescriptions during this visit.          Cuca Logan, APRN  04/12/22 0019

## 2022-04-14 NOTE — PROGRESS NOTES
"Subjective   Karma Steen is a 32 y.o. female.       HPI   Pt is here today for [facility] ER follow up from 4/11/22 for right arm pain.  ER course per hospital record:   \"ED Course, Workup, Summary and Disposition: Patient of the above exam and work-up for injury to right wrist.  She is visible contusion noted.  Neurovascular intact.  X-ray reveals no acute findings, interpreted per ED physician, Dr. Alba, viewed by me.  Patient will be placed in Ace wrap, advised to use over-the-counter pain medication and follow-up with PCP.  I spoke with the patient at the bedside regarding their plan of care, discharge instruction, home care, prescriptions, indications to return to the emergency department, and importance follow-up.  We discussed test results at the bedside, including incidental abnormal labs, radiological findings, understands need for follow-up with primary care or specialist if indicated. \"    Xray right wrist on 4/11- no fractures.    Continues to have pain; more in forearm.  Has felt shooting pain sensation up the right arm at random times; sometimes feels pain shoot into fingers.  Swelling is improving; still bruised.   Able to  and make fist.     Wants to discuss medication for weight loss.  She has been working out about 30 minutes daily for the past month; has been working on a healthy diet plan.  Not seeing any changes on the scale.  Denies any CP; palpitations; SOA; dizziness; headache; trouble with vision.     The following portions of the patient's history were reviewed and updated as appropriate: allergies, current medications, past family history, past medical history, past social history, past surgical history and problem list.    Review of Systems   Constitutional: Negative for chills, fatigue and fever.   Respiratory: Negative for cough, chest tightness, shortness of breath and wheezing.    Cardiovascular: Negative for chest pain and palpitations.   Gastrointestinal: Negative for abdominal " pain, blood in stool, constipation, diarrhea, nausea, vomiting and indigestion.   Musculoskeletal: Positive for arthralgias.   Neurological: Negative for weakness and numbness.   Psychiatric/Behavioral: Negative for depressed mood. The patient is not nervous/anxious.        Objective   Physical Exam  Vitals reviewed.   Constitutional:       General: She is not in acute distress.     Appearance: Normal appearance.   Cardiovascular:      Rate and Rhythm: Normal rate and regular rhythm.      Pulses: Normal pulses.      Heart sounds: Normal heart sounds. No murmur heard.  Pulmonary:      Effort: Pulmonary effort is normal. No respiratory distress.      Breath sounds: Normal breath sounds. No wheezing.   Chest:      Chest wall: No tenderness.   Abdominal:      Tenderness: There is no right CVA tenderness or left CVA tenderness.   Musculoskeletal:      Right forearm: Swelling (mild) and tenderness present. No edema.      Right wrist: Swelling and tenderness present. No snuff box tenderness. Normal range of motion. Normal pulse.      Right hand: No swelling, deformity or tenderness. Normal range of motion. Normal strength. Normal sensation. Normal capillary refill. Normal pulse.   Skin:     General: Skin is warm and dry.      Findings: Bruising (over right wrist) present.   Neurological:      General: No focal deficit present.      Mental Status: She is alert and oriented to person, place, and time.   Psychiatric:         Mood and Affect: Mood normal.           Assessment/Plan   Diagnoses and all orders for this visit:    1. Right arm pain (Primary)  Comments:  Xray of forearm ordered.   Cont. ACE wrap; ice; Tylenol/ibuprofen as needed.   Consider Ortho referral.  Orders:  -     XR Forearm 2 View Right; Future    2. Accidental fall, subsequent encounter  Comments:  Xray right wrist from 4/11 showed no acute abnormalities.   Xray of forearm ordered.   Cont. ACE wrap; ice; Tylenol/ibuprofen as needed.   Consider Ortho  referr  Orders:  -     XR Forearm 2 View Right; Future    3. Obesity, Class III, BMI 40-49.9 (morbid obesity) ([facility])  Comments:  Given Phentermine (month #1).   Cont. working on healthy diet; exercise at least 150 minutes weekly.   RTO in 4 weeks.   Orders:  -     phentermine 37.5 MG capsule; Take 1 capsule by mouth Every Morning.  Dispense: 30 capsule; Refill: 0

## 2022-04-15 ENCOUNTER — OFFICE VISIT (OUTPATIENT)
Dept: FAMILY MEDICINE CLINIC | Facility: CLINIC | Age: 32
End: 2022-04-15

## 2022-04-15 ENCOUNTER — HOSPITAL ENCOUNTER (OUTPATIENT)
Dept: GENERAL RADIOLOGY | Facility: HOSPITAL | Age: 32
Discharge: HOME | End: 2022-04-15
Admitting: NURSE PRACTITIONER

## 2022-04-15 VITALS
BODY MASS INDEX: 49.51 KG/M2 | HEIGHT: 64 IN | OXYGEN SATURATION: 97 % | SYSTOLIC BLOOD PRESSURE: 123 MMHG | DIASTOLIC BLOOD PRESSURE: 83 MMHG | HEART RATE: 73 BPM | WEIGHT: 290 LBS
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
## 2022-04-15 DIAGNOSIS — E66.01 OBESITY, CLASS III, BMI 40-49.9 (MORBID OBESITY): ICD-10-CM

## 2022-04-15 DIAGNOSIS — M25.531 RIGHT WRIST PAIN: Primary | ICD-10-CM

## 2022-04-15 DIAGNOSIS — M79.601 RIGHT ARM PAIN: ICD-10-CM

## 2022-04-15 DIAGNOSIS — M79.601 RIGHT ARM PAIN: Primary | ICD-10-CM

## 2022-04-15 PROCEDURE — 73090 X-RAY EXAM OF FOREARM: CPT

## 2022-04-15 PROCEDURE — 99214 OFFICE O/P EST MOD 30 MIN: CPT | Performed by: NURSE PRACTITIONER

## 2022-04-15 RX ORDER — PHENTERMINE HYDROCHLORIDE 37.5 MG/1
37.5 CAPSULE ORAL EVERY MORNING
Qty: 30 CAPSULE | Refills: 0 | Status: SHIPPED | OUTPATIENT
Start: 2022-04-15 | End: 2022-05-24 | Stop reason: SDUPTHER

## 2022-04-26 ENCOUNTER — TELEPHONE (OUTPATIENT)
Dept: FAMILY MEDICINE CLINIC | Facility: CLINIC | Age: 32
End: 2022-04-26

## 2022-04-26 RX ORDER — ESCITALOPRAM OXALATE 10 MG/1
10 TABLET ORAL DAILY
Qty: 90 TABLET | Refills: 0 | Status: SHIPPED | OUTPATIENT
Start: 2022-04-26 | End: 2022-07-05

## 2022-04-26 NOTE — TELEPHONE ENCOUNTER
Caller:  JAIMEE DOMINGUEZ     Relationship to Patient: SELF    Phone Number:  [phone]     Reason for Call:  PATIENT CALLED TO LET DR. BRASHER KNOW THAT SHE TOOK HER ADVICE AND DID SEE A THERAPIST AT [facility] LAST WEEK.  THE THERAPIST REFERRED HER TO A NURSE PRACTITIONER,  HOWEVER, PATIENT CANNOT GET IN UNTIL June 5 WITH THE NURSE PRACTITIONER TO START GETTING THE LEXAPRO.   NEXT APPOINTMENT WITH JANINA ANDRADE IS MAY 19TH.  PATIENT WANTED TO LET DR. BRASHER KNOW THAT SHE DID CALL IN A RENEWAL ON THE LEXAPRO ONLY BECAUSE SHE RAN OUT  AND WON'T BE ABLE TO GET IT FILLED UNTIL SHE SEES THE NURSE PRACTITIONER IN JUNE.

## 2022-05-11 ENCOUNTER — E-VISIT (OUTPATIENT)
Dept: FAMILY MEDICINE CLINIC | Facility: TELEHEALTH | Age: 32
End: 2022-05-11

## 2022-05-11 PROCEDURE — BRIGHTMDVISIT: Performed by: NURSE PRACTITIONER

## 2022-05-11 NOTE — E-VISIT TREATED
Chief Complaint: Coronavirus (COVID-19), cold, sinus pain, allergy, or flu   Patient introduction   Patient is 32-year-old female who reports cough, fever (which may have resolved; see below), congestion, rhinitis, itchy or watery eyes, itchy nose or sneezing, sore throat, headache, sweats, myalgia, and fatigue that started 3-5 days ago. Regarding date of symptom onset, patient writes: 5/6/2022.   When asked why they're seeking care online today, patient reports they want to know if they have a cold or something more serious and just want to feel better.   Patient has not requested COVID testing.   COVID-19 exposure, testing history, and vaccination status:    No known exposure to a confirmed or suspected case of COVID-19.    No recent travel outside of their local community.    Patient had a self-test 7-14 days ago. Test result was negative.    Reports receiving 2 doses of the COVID-19 vaccine (Pfizer, Pfizer). Received their most recent dose of the vaccine > 14 days ago.   Risk factors for severe disease from COVID-19 infection:    BMI >= 40    Asthma    Chronic lung disease   Patient did not request an excuse note.   General presentation   Symptoms came on suddenly.   Fever:    Reports having had measured fever, which has since resolved. Patient's current temperature is normal.    Highest temperature of < 100.4F.   Sinus and nasal symptoms:    Reports rhinitis.    Reports itchy nose or sneezing.    Reports green nasal drainage.    Nasal drainage is thick.    Reports postnasal drip.    Reports congestion with sinus pain or pressure on or around their forehead, eyes, nose, cheeks, and upper teeth or jaw.    Patient first noticed sinus pain 5-9 days ago.    Sinus pain is worse with Valsalva.    Denies history of unhealed nasal septal ulcer/nasal wound.    Denies antibiotic treatment for sinus infection in the last year.    Denies history of deviated septum or nasal polyps.   Sore throat:    Reports sore throat.     Reports previous tonsillectomy.    Denies recent strep exposure.    Patient does not think they have strep.    Reports discomfort when swallowing but affirms ability to swallow liquids and solid foods.   Head and body aches:    Reports headache, described as moderate (4-6 on a scale of 1-10).    Reports sweats.    Reports myalgia.    Reports fatigue.    Denies chills.   Cough:    Reports cough.    Cough is worse at night/while sleeping.    Cough is productive of sputum.    Describes color of mucus as green.   Wheezing and SOB:    Reports having asthma.    Current cold symptoms aggravate their asthma.    Reports using an albuterol inhaler and an inhaled steroid with long-acting bronchodilator for asthma.    Reports using albuterol every 6 hours or longer.    Denies COPD diagnosis.    Denies wheezing.    Denies shortness of breath.   Chest pain:    Denies chest pain.   Ear pressure/pain:    Reports pain, pressure, fullness, and a plugged or blocked sensation.   Dizziness:    Reports mild dizziness that does not interfere with daily activities.   Allergies:    Reports history of allergies.    Patient does not think symptoms are allergy-related.    Patient has known seasonal allergies, known pet allergies, and known dust allergies.   Flu exposure:    Denies recent exposure to confirmed flu diagnosis.    Reports a flu vaccine in the last 3-6 months.   Patient denies the following red flags:    Changes in alertness or awareness    Symptoms suggesting respiratory distress    Symptoms suggesting airway obstruction    Symptoms suggesting intracranial hemorrhage    Decreased urination   Pregnancy/menstrual status/breastfeeding:    Denies being pregnant    Denies breastfeeding    Regarding last menstrual period, patient writes: Currently bleeding, had a period about 2 weeks ago but for some reason started again and am cramping pretty bad.   Self-exam:    No difficulty moving their chin toward their chest    No palatal  petechiae    Swollen and tender neck lymph nodes    Mild periorbital edema    Denies antibiotic treatment for similar symptoms within the past month   Current medications   Reports taking over-the-counter medication for current symptoms. Patient has taken dextromethorphan and guaifenesin.   Reports taking Sinex, Prilosec, and Xyzal.   Medication allergies    Cephalosporins    Penicillins   Medication contraindication review   Reports history positive for depression. Therefore, the following medication(s) will not be prescribed:    Metoclopramide   Denies history of metoclopramide-associated dystonic reaction and tardive dyskinesia.   No known history of azithromycin-associated cholestatic jaundice or hepatic impairment.   Past medical history   Immune conditions: Denies immunocompromising conditions. Denies history of cancer.   Social history   High-risk household contacts: Patient's household includes one or more persons with the following: asthma.   Non-smoker.   Assessment   Bacterial sinusitis. Ruled out: Traumatic laryngitis.   This is the likely diagnosis based on patient's interview responses, including:    Congestion or sinus pressure    Thick nasal discharge    Yellow or green mucus    Short duration of symptoms indicating severe symptoms at onset   HHS required information for COVID-19 lab data reporting (if test is ordered):   Symptoms:    Fever    Sweats    Cough    Fatigue    Myalgia    Headache    Sore throat    Nasal congestion    Rhinitis   Symptom onset: 3-5 days ago ago  Pregnancy: No or N/A  Healthcare worker? No  Resident in a congregate care setting? No  Previous history of COVID-19 testing: Patient had a self-test 7-14 days ago. Test result was negative.     Plan   Medications:    doxycycline monohydrate 100 mg tablet RX 100mg 1 tab PO bid 10d for infection. This medication is an antibiotic. Take it exactly as directed. You must finish the entire course of medication, even if you feel better  after the first few days of treatment. Amount is 20 tab.    fluticasone 50 mcg/actuation nasal spray,suspension OTC 50mcg 2 sprays each nostril nasal qd 30d for nasal symptoms due to allergies or sinusitis. Fluticasone needs to be used every day to be effective. It may take up to a week for the full effects of the medication to be seen. Brands to look for include Flonase. Amount is 16 g.   The patient's prescription will be sent to:   [facility] [address] [place] [address] [address] [place] [id number]   Phone: [phone]     Fax: [phone]   Patient informed to purchase OTC medication.   Education:    Condition and causes    Prevention    Treatment and self-care    When to call provider   ----------   Electronically signed by JOE Brown on 2022-05-11 at 07:27AM   ----------   Patient Interview Transcript:   Why are you getting care through [facility] today? We can't guarantee a specific treatment or test. Your provider will decide what's best for you. Select all that apply.    I want to know if I have a cold or something more serious    I just want to feel better!   Not selected:    I want to know if I need to be seen by a provider    I need a doctor's note    I want to be tested for COVID-19    I want to get the COVID-19 vaccine    I think I'm having side effects from the COVID-19 vaccine    None of the above   COVID-19 symptoms are similar to symptoms of other illnesses. This makes it difficult to diagnose. Please carefully consider each question and answer as best you can. This will help your provider make the right diagnosis. Which of these symptoms are bothering you? Select all that apply.    Cough    Fever    Stuffed-up nose or sinuses    Runny nose    Itchy or watery eyes    Itchy nose or sneezing    Sore throat    Headache    Sweats    Muscle or body aches    Fatigue or tiredness   Not selected:    Shortness of breath    Loss of smell or taste    Hoarse voice or loss of voice    Chills    Nausea or  vomiting    Diarrhea    I don't have any of these symptoms   Do you have any of these conditions? These conditions can put you at increased risk for severe disease if you're infected with COVID-19. Select all that apply.    Chronic lung disease, such as cystic fibrosis or interstitial fibrosis   Not selected:    Heart disease, such as congenital heart disease, congestive heart failure, or coronary artery disease    Disorder of the brain, spinal cord, or nerves and muscles, such as dementia, cerebral palsy, epilepsy, muscular dystrophy, or developmental delay    Metabolic disorder or mitochondrial disease    Cerebrovascular disease, such as stroke or another condition affecting the blood vessels or blood supply to the brain    Down syndrome    Mood disorder, including depression or schizophrenia spectrum disorders    Substance use disorder, such as alcohol, opioid, or cocaine use disorder    Tuberculosis    None of the above   Do you live in a group care setting? Examples include: - Nursing home - Residential care - Psychiatric treatment facility - [facility] - [facility] - Board and care home - Homeless shelter - Foster care setting Select one.    No   Not selected:    Yes   Have you ever been tested for COVID-19? Select one.    Yes   Not selected:    No   When was your most recent COVID-19 test? Select one.    7 to 14 days ago   Not selected:    Within the last week (specify date in MM/DD/YY format)    15 to 30 days ago    1 to 3 months ago    More than 3 months ago   What type of COVID-19 test did you most recently have? There are two types of COVID-19 tests: - Viral tests check if you're currently infected with COVID-19. For these tests, a nose swab or saliva sample is taken. Viral tests include self-tests and tests done at a doctor's office, lab, or testing site. - Antibody tests check if you've been infected in the past. For these tests, your blood is drawn. Antibody tests can only be done at a doctor's office,  "lab, or testing site. Select one.    Viral self-test   Not selected:    Viral test at a doctor's office, lab, or testing site    Antibody test   What was the result of your most recent COVID-19 test? Select one.    Negative   Not selected:    Positive    I'm not sure   Have you gotten the COVID-19 vaccine? Select one.    Yes   Not selected:    No   How many doses of the COVID-19 vaccine have you gotten? This includes boosters as well as third or fourth doses for those who are immunocompromised. Select one.    2 doses   Not selected:    1 dose    3 doses    4 doses   Which COVID-19 vaccine did you get for your first dose? Check your Vaccination Record Card under Product Name/. Select one.    Pfizer-BioNTech (Pfizer)   Not selected:    Ilir & Ilir's Rain Vaccine (J&J/Rain)    Moderna   Which COVID-19 vaccine did you get for your second dose? Check your Vaccination Record Card under Product Name/. Select one.    Pfizer-BioNTech (Pfizer)   Not selected:    Ilir & Ilir's Rain Vaccine (J&J/Rain)    Moderna   When did you get your most recent dose of the COVID-19 vaccine?    More than 14 days ago   Not selected:    Less than 48 hours (2 days) ago    48 to 72 hours (3 days) ago    3 to 5 days ago    5 to 7 days ago    7 to 14 days ago   In the last 14 days, have you traveled outside of your local community? This includes travel by car, RV, bus, train, or plane. Travel increases your chances of getting and spreading COVID-19. Select one.    No   Not selected:    Yes   In the last 14 days, have you had close contact with someone who has coronavirus (COVID-19)? \"Close contact\" means any of these: - Living in the same household as someone with COVID-19. - Caring for someone with COVID-19. - Being within 6 feet of someone with COVID-19 for a total of at least 15 minutes over a 24-hour period. For example, three 5-minute exposures for a total of 15 minutes. - Being in direct contact " with respiratory droplets from someone with COVID-19 (being coughed on, kissing, sharing utensils). Select one.    No, not that I know of   Not selected:    Yes, a confirmed case    Yes, a suspected case   When did your current symptoms start? Select one.    3 to 5 days ago   Not selected:    Less than 48 hours ago    6 to 9 days ago    10 to 14 days ago    2 to 4 weeks ago    More than a month ago   Do you know the exact date your symptoms started? If so, enter the date in MM/DD/YY format. Select one.    Yes (specify): 5/6/2022   Not selected:    No   Did your symptoms come on suddenly or gradually? Select one.    Suddenly   Not selected:    Gradually    I'm not sure   You mentioned having a fever. Do you have a fever now? Select one.    No, it's gone now   Not selected:    Yes, and I've had one since my symptoms started    Yes, but I didn't have one when my symptoms started    I'm not sure   Did you take your temperature with a thermometer? Select one.    Yes   Not selected:    No, but it felt mild    No, but it felt high   What was the highest reading on the thermometer? Select one.    Below 100.4F   Not selected:    100.4 to 101.5F    101.6 to 101.9F    102.0 to 103.0F    Above 103.0F   You mentioned having a headache. On a scale of 1 to 10, how severe is your headache pain? Select one.    Moderate (4 to 6)   Not selected:    Mild (1 to 3)    Severe (7 to 9)    Unbearable (10)    The worst headache of my life (10+)   Do you cough so hard that it's made you gag or vomit? By gag, we mean has your coughing made you choke or dry heave? Select all that apply.    No   Not selected:    Yes, my coughing has made me gag    Yes, my coughing has made me vomit   When is your cough the worst? Select all that apply.    At nighttime, or while I'm sleeping   Not selected:    In the morning, or when I wake up    During the day    I'm not sure   Are you coughing up mucus or phlegm? Select one.    Yes, a lot   Not selected:    No,  "my cough is dry    Yes, a little   What color is most of the mucus or phlegm that you're coughing up? Select one.    Green   Not selected:    Clear    White/frothy    Yellow    Red or pink    I'm not sure   You mentioned having a stuffy nose or sinus congestion. Do you feel pain or pressure in your sinuses?    Yes   Not selected:    No    I'm not sure   Where do you feel sinus pain or pressure?    In my forehead    Around my eyes    Behind my nose    In my cheeks    In my upper teeth or jaw   Not selected:    I'm not sure   When did you first notice your sinus pain or pressure? Select one.    5 to 9 days ago   Not selected:    Less than 5 days ago    10 to 14 days ago    2 to 4 weeks ago    1 month ago or longer   Does coughing, sneezing, or leaning forward make your sinuses feel worse? Select one.    Yes   Not selected:    No    I'm not sure   What color is your nasal drainage? Select one.    Green   Not selected:    Clear    White    Yellow    My nose is stuffed but not draining or running    I'm not sure   Is your nasal drainage thick or thin? Select one.    Thick   Not selected:    Thin    I'm not sure   Is there any drainage (mucus) going down the back of your throat? This kind of drainage is also called \"postnasal drip.\" Select one.    Yes   Not selected:    No    I'm not sure   Can you swallow liquids and solid foods? A sore throat may be painful when swallowing, but it shouldn't prevent you from swallowing. Select one.    Yes, but it's uncomfortable   Not selected:    Yes, with ease    Yes, but it's painful    It's hard to swallow anything because it feels like liquids and food get stuck in my throat    No, I can't swallow anything, liquid or solid foods   Since your symptoms started, have you felt dizzy? Select one.    Yes, but I can continue with my regular daily activities   Not selected:    Yes, and it makes it hard to stand, walk, or do daily activities    No   Do you have chest pain? You might also " feel it as discomfort, aching, tightness, or squeezing in the chest. Select one.    No   Not selected:    Yes   Have you urinated at least 3 times in the last 24 hours? Select one.    Yes   Not selected:    No    I'm not sure   Changes in alertness or awareness may mean you need emergency care. Since your symptoms started, have you had any of these? Select all that apply.    None of the above   Not selected:    Confusion    Slurred speech    Not knowing where you are or what day it is    Difficulty staying conscious    Fainting or passing out   Do your symptoms include a whistling sound, or wheezing, when you breathe? Select one.    No   Not selected:    Yes    I'm not sure   Are your eyelids or the areas around your eyes puffy? Select one.    Yes, but I can easily open my eye(s)   Not selected:    Yes, and it's hard to open my eye(s)    Yes, and my eye(s) are completely swollen shut    No   Do you have any of these symptoms in your ear(s)? Select all that apply.    Pain    Pressure    Fullness    Plugged or blocked sensation   Not selected:    Crackling or popping    None of the above   Can you move your chin toward your chest?    Yes   Not selected:    No, my neck is too stiff   Are your tonsils larger than usual?    I've had my tonsils removed   Not selected:    Yes    No    I'm not sure   Are there red spots on the roof of your mouth or the back of your throat?    No   Not selected:    Yes    I'm not sure   Are your glands/lymph nodes swollen, or does it hurt when you touch them?    Yes   Not selected:    No    I'm not sure   People with a very high body mass index (BMI) are at higher risk for developing complications from the flu and severe illness from COVID-19. To determine your BMI, we need to know your weight and height. Please enter your weight (in pounds).    Weight   Please enter your height.    Height   In the past 2 weeks, has anyone around you (such as at school, work, or home) had a confirmed  diagnosis of strep throat? A confirmed diagnosis means that a throat swab and lab test were done to verify a strep throat infection. Select one.    No   Not selected:    Yes    I'm not sure   Do you think you might have strep throat? Select one.    No   Not selected:    Yes    I'm not sure   In the past week, has anyone around you (such as at school, work, or home) had a confirmed diagnosis of the flu? A confirmed diagnosis means that a nose swab was done to verify a flu infection. Select all that apply.    No   Not selected:    I live with someone who has the flu    I've been within touching distance of someone who has the flu    I've walked by, or sat about 3 feet away from, someone who has the flu    I've been in the same building as someone who has the flu    I'm not sure   Have you ever been diagnosed with asthma? Select one.    Yes   Not selected:    No   Are your cold symptoms making your asthma worse? Select one.    Yes   Not selected:    No   What medications or inhalers are you currently using for your asthma? Select all that apply.    Albuterol inhaler, as needed (such as ProAir, Proventil, Ventolin)    Inhaled steroid with long-acting bronchodilator (such as Advair, Dulera, Symbicort)   Not selected:    Inhaled steroid (such as Qvar, Pulmicort, Flovent)    I'm not sure    I'm not taking any medications for my asthma    I usually take medications for my asthma, but I ran out   How often are you using albuterol? If you're using albuterol very frequently, you'll need to be seen in person. Select one.    Every 6 hours or longer   Not selected:    More than once an hour    Every 1 to 2 hours    Every 2 to 3 hours    Every 3 to 4 hours    Every 4 to 6 hours   Would you like a prescription for albuterol? Select one.    No   Not selected:    Yes   Have you ever been diagnosed with chronic obstructive pulmonary disease (COPD)? Select one.    No   Not selected:    Yes    I'm not sure   In the last year, how many  times were you treated with antibiotics for a sinus infection? Select one.    None   Not selected:    1 to 3 times    4 or more times   Have you been diagnosed with a deviated septum or nasal polyps? The nose is divided into two nostrils by the septum. A crooked septum is called a deviated septum. Nasal polyps are growths inside the nose or sinuses. Select one.    No   Not selected:    Yes, but I had surgery to treat them    Yes, I have a deviated septum    Yes, I have nasal polyps    Yes, I have a deviated septum and nasal polyps    I'm not sure   Do you have a sore inside your nose that won't heal? Select one.    No   Not selected:    Yes    I'm not sure   Do you have allergies (pollen, dust mites, mold, animal dander)? Select one.    Yes   Not selected:    No    I'm not sure   What kind of allergies do you have? Select all that apply.    Seasonal allergies (hay fever)    Pet allergies    Dust allergies   Not selected:    None of the above    I'm not sure   Do you think your symptoms could be allergy-related? Select one.    No   Not selected:    Yes    I'm not sure   Have you had a flu shot this season? Select one.    Yes, 3 to 6 months ago   Not selected:    Yes, less than 2 weeks ago    Yes, 2 to 4 weeks ago    Yes, 1 to 3 months ago    Yes, more than 6 months ago    I'm not sure    No   The flu and COVID-19 can be more serious for people with certain conditions or characteristics. These questions help us figure out if you or anyone you live with is at higher risk for complications from these infections. Do either of these statements apply to you? Select all that apply.    None of the above   Not selected:    I'm  or Native [place]    I'm a healthcare worker   Do you smoke tobacco? Select one.    No, never   Not selected:    Yes, every day    Yes, some days    No, I quit   Some conditions can put you at risk for more serious infections. Do any of these apply to you? Select all that apply.    None  of the above   Not selected:    I've been hospitalized within the last 5 days    I have diabetes    I'm in close contact with a child in    Are you currently being treated for any of these conditions? Scroll to see all options. Select all that apply.    Depression   Not selected:    Aspirin triad (also known as Samter's triad or ASA triad)    Asthma or hives from taking aspirin or other NSAIDs, such as ibuprofen or naproxen    Blockage or narrowing of the blood vessels of the heart    Blood dyscrasia, such anemia, leukemia, lymphoma, or myeloma    Bone marrow depression    Catecholamine-releasing paraganglioma    Blood clotting disorder    Congenital long QT syndrome    Difficulty urinating or completely emptying your bladder    Uncorrected electrolyte abnormalities    Fungal infection    Gastrointestinal (GI) bleeding    Gastrointestinal (GI) obstruction    G6PD deficiency    Recent heart attack    High blood pressure    Irregular heartbeat or heart rhythm    Kidney disease or hemodialysis    Mononucleosis (mono)    Myasthenia gravis    Parkinson's disease    Pheochromocytoma    Reye syndrome    Seizure disorder    Ulcerative colitis    None of the above   Do you have any of these conditions that can affect the immune system? Scroll to see all options. Select all that apply.    None of these   Not selected:    History of bone marrow transplant    Chronic kidney disease    Chronic liver disease (including cirrhosis)    HIV/AIDS    Inflammatory bowel disease (Crohn's disease or ulcerative colitis)    Lupus    Moderate to severe plaque psoriasis    Multiple sclerosis    Rheumatoid arthritis    Sickle cell anemia    Alpha or beta thalassemia    History of solid organ transplant (kidney, liver, or heart)    History of spleen removal    An autoimmune disorder not listed here    A condition requiring treatment with long-term use of oral steroids (such as prednisone, prednisolone, or dexamethasone)   Have you ever  been diagnosed with cancer? Select one.    No   Not selected:    Yes, I have cancer now    Yes, but I'm in remission   Have you ever had either of these conditions? Select all that apply.    No   Not selected:    Metoclopramide-associated dystonic reaction    Tardive dyskinesia   Do any of these apply to the people who live with you? Select all that apply.    None of the above   Not selected:    A child under the age of 5    An adult 65 or older    A person who is pregnant    A person who has given birth, had a miscarriage, had a pregnancy loss, or had an  in the last 2 weeks    An  or Native [place]   Does any member of your household have any of these medical conditions? Select all that apply.    Asthma   Not selected:    Disorders of the brain, spinal cord, or nerves and muscles, such as dementia, cerebral palsy, epilepsy, muscular dystrophy, or developmental delay    Chronic lung disease, such as COPD or cystic fibrosis    Heart disease, such as congenital heart disease, congestive heart failure, or coronary artery disease    Cerebrovascular disease, such as stroke or another condition affecting the blood vessels or blood supply to the brain    Blood disorders, such as sickle cell disease    Diabetes    Metabolic disorders such as inherited metabolic disorders or mitochondrial disease    Kidney disorders    Liver disorders    Weakened immune system due to illness or medications such as chemotherapy or steroids    Children under the age of 19 who are on long-term aspirin therapy    Extreme obesity (BMI > 40)    None of the above   Are you pregnant? Select one.    No   Not selected:    Yes   When was your last menstrual period? If you don't currently have periods or no longer have periods, please briefly explain.    Currently bleeding, had a period about 2 weeks ago but for some reason started again and am cramping pretty bad.   Within the last 2 weeks, have you: - Given birth - Had a  miscarriage - Had a pregnancy loss - Had an  Being postpartum (live birth or loss) within the last 2 weeks increases your risk of flu complications. Select one.    No   Not selected:    Yes   Are you breastfeeding? Select one.    No   Not selected:    Yes   Just a few more questions about medications, and then you're finished. Have you used any non-prescription medications or nasal sprays for your current symptoms? Examples include saline sprays, decongestants, NyQuil, and Tylenol. Select one.    Yes   Not selected:    No   Which of these non-prescription medications have you tried? Scroll to see all options. Select all that apply.    Dextromethorphan (Delsym, Robitussin, Vicks DayQuil Cough)    Guaifenesin (Mucinex)   Not selected:    Acetaminophen (Tylenol)    Budesonide (Rhinocort)    Cetirizine (Zyrtec)    Chlorpheniramine (Aller-chlor, Chlor-Trimeton)    Cromolyn (NasalCrom)    Diphenhydramine (Benadryl)    Fexofenadine (Allegra)    Fluticasone (Flonase)    Guaifenesin/dextromethorphan (Delsym DM, Mucinex DM, Robitussin DM)    Ibuprofen (Advil, Motrin, Midol)    Ketotifen (Alaway, Zaditor)    Loratadine (Alavert, Claritin)    Naphazoline-pheniramine (Naphcon-A, Opcon-A, Visine-A)    Omeprazole (Prilosec)    Oxymetazoline (Afrin)    Phenylephrine (Sudafed)    Triamcinolone (Nasacort)    None of the above   In the past month, have you taken antibiotics for similar symptoms? Examples of antibiotics include amoxicillin, amoxicillin-clavulanate (Augmentin), penicillin, cefdinir (Omnicef), doxycycline, and clindamycin (Cleocin). Select one.    No   Not selected:    Yes    I'm not sure   Have you taken any monoamine oxidase inhibitor (MAOI) medications in the last 14 days? Examples include rasagiline (Azilect), selegiline (Eldepryl, Zelapar), isocarboxazid (Marplan), phenelzine (Nardil), and tranylcypromine (Parnate). Select one.    No   Not selected:    Yes    I'm not sure   Do you take Kynmobi or Apokyn  "(apomorphine)? Select one.    No   Not selected:    Yes    I'm not sure   Are you taking any other medications or supplements? On the next screen, you need to list all vitamins, supplements, non-prescription medications (such as aspirin or Aleve), and prescription medications that you're taking. Select one.    Yes   Not selected:    Yes, but I'm not sure what they are    No   Have you ever had an allergic or bad reaction to any medication? Select one.    Yes   Not selected:    No   Have you had an allergic or bad reaction to any of these medications? Select all that apply.    No, not that I know of   Not selected:    Baloxavir (Xofluza)    Benzonatate (Tessalon Perles)    Fluconazole, itraconazole, or terconazole (brands include Diflucan, Sporanox, Terazol)    Oseltamivir (Tamiflu) or zanamivir (Relenza)    Paxlovid, nirmatrelvir, or ritonavir (Norvir)   Have you had an allergic or bad reaction to any of these antibiotic medications? Select all that apply.    Penicillin or any \"-cillin\" antibiotic, such as amoxicillin, ampicillin, dicloxacillin, nafcillin, or piperacillin (Brands include Augmentin, Unasyn, and Zosyn)    Cephalexin or any \"cef-\" antibiotic, such as cefazolin, cefdinir, cefuroxime, ceftriaxone, ceftazidime, or cefepime (Brands include Ancef, Ceftin, Fortaz, Keflex, Maxipime, Rocephin, and Simplicef)   Not selected:    Tetracycline or any \"-cycline\" antibiotic, such as doxycycline, demeclocycline, minocycline (Brands include Declomycin, Doryx, Dynacin, Oracea, Monodox, Panmycin, and Vibramycin)    Ciprofloxacin or any \"-floxacin\" antibiotic, such as gemifloxacin, levofloxacin, moxifloxacin, or ofloxacin (Brands include Factive, Cipro, Floxin, and Levaquin)    Azithromycin or any \"-thromycin\" antibiotic, such as erythromycin or clarithromycin (Brands include Biaxin, Erythrocin, Z-jose a, and Zithromax)    Clindamycin or lincomycin (Brands include Cleocin and Lincocin)    None of the above    I'm not sure   " Have you had an allergic or bad reaction to any of these medications? Select all that apply.    None of the above   Not selected:    Albuterol or a similar medication    Corticosteroid (steroid) medication, including topical steroids, inhaled steroids, nasal steroids, or oral steroids (budesonide, ciclesonide, dexamethasone, flunisolide, fluticasone, methylprednisolone, triamcinolone, prednisone (or brand names Alvesco, Deltasone, Flovent, Medrol, Nasacort, Rhinocort, or Veramyst)    Metoclopramide (Reglan)    Ondansetron (Zuplenz, Zofran ODT, Zofran)    Prochlorperazine (Compazine)    I'm not sure   Have you had an allergic or bad reaction to any of these eye drops or nasal sprays? Scroll to see all options. Select all that apply.    None of the above   Not selected:    Azelastine (Astelin, Astepro, Optivar)    Cromolyn (Crolom, NasalCrom)    Ipratropium (Atrovent)    Ketotifen (Alaway, Zaditor)    Pheniramine/naphazoline (Naphcon-A, Opcon-A, Visine-A)    Olopatadine (Pataday, Patanol, Pazeo)    I'm not sure   Have you had an allergic or bad reaction to any of these non-prescription medications? Scroll to see all options. Select all that apply.    None of the above   Not selected:    Acetaminophen (Tylenol)    Aspirin    Cetirizine (Zyrtec)    Chlorpheniramine (Aller-chlor, Chlor-Trimeton)    Dextromethorphan (Delsym, Robitussin, Vicks DayQuil Cough)    Diphenhydramine (Benadryl)    Fexofenadine (Allegra)    Guaifenesin (Mucinex)    Guaifenesin/dextromethorphan (Delsym DM, Mucinex DM, Robitussin DM)    Ibuprofen (Advil, Motrin, Midol)    Loratadine (Alavert, Claritin)    Oxymetazoline (Afrin)    Phenylephrine (Sudafed)    I'm not sure   Are you allergic to milk or to the proteins found in milk (for example, whey or casein)? A milk allergy is different from lactose intolerance. Select one.    No   Not selected:    Yes    I'm not sure   Have you ever had jaundice or liver problems as a result of taking azithromycin  (Zithromax, Zmax)? Jaundice is a condition in which the skin and the whites of the eyes turn yellow. Select all that apply.    No, not that I know of   Not selected:    Yes, jaundice    Yes, liver problems   Do you need a doctor's note? A doctor's note confirms that you received care today and states when you can return to school or work. It does not contain information about your diagnosis or treatment plan. Your provider will make the final decision on whether to give you a doctor's note and for how long. Doctor's notes CANNOT be backdated. We can't provide medical leave paperwork through this type of visit. If more paperwork is needed to request time off, contact your primary care provider. Select one.    No   Not selected:    Today only (1 day)    Today and tomorrow (2 days)    3 days    7 days    10 days    14 days   Is there anything else you'd like to tell us about your symptoms?   The patient did not enter any additional information.   ----------   Medical history   Medical history data does not currently exist for this patient.

## 2022-05-11 NOTE — EXTERNAL PATIENT INSTRUCTIONS
Note   Drink plenty of fluids If symptoms do not improve in 3-5 days follow up with your primary care provider or urgent care   Diagnosis   Bacterial sinusitis   My name is Kaya Feliz, and I'm a healthcare provider at [facility]. I reviewed your interview, and I see that you have bacterial sinusitis.   To prevent the spread of illness to others, I recommend that you stay home and away from other people as much as possible while you're sick.   Medications   Your pharmacy   [address] [place] [address] [phone]     Prescription   Doxycycline monohydrate (100mg): Take 1 tablet by mouth twice a day for 10 days for infection. This medication is an antibiotic. Take it exactly as directed. You must finish the entire course of medication, even if you feel better after the first few days of treatment.    Start taking the antibiotics I've prescribed right away. You need to finish the entire course of antibiotics, even if you start to feel better before the pills run out.   Non-prescription   Fluticasone (50mcg): Spray 2 times in each nostril daily for nasal symptoms due to allergies or sinusitis. Fluticasone needs to be used every day to be effective. It may take up to a week for the full effects of the medication to be seen. Brands to look for include Flonase.   Some women develop yeast infections after taking antibiotics. If you develop a yeast infection, you can treat it with antifungal creams or suppositories. These are available without a prescription at "[employer]" and many supermarkets.   About your diagnosis   The sinuses are hollow spaces connected to the nasal passages. Sinusitis occurs when the sinuses swell and block the drainage of fluid and mucus from the nose, causing pain, pressure, and congestion. Fatigue, difficulty sleeping, or decreased appetite may accompany your symptoms.   More than 90% of sinus infections are caused by viruses. However, in certain cases,  a sinus infection may be caused by bacteria. Bacterial sinus infections usually look like one of the following cases:    Severe sinus symptoms with a fever over 102F.    Sinus symptoms that have not improved at all after 10 days.    Cold symptoms that slowly improve but then worsen again after 5 or 6 days, usually with a high fever, headache, or nasal discharge.   What to expect   If you follow this treatment plan, you should start to feel better within a few days.   You can return to your normal activities when ALL of the following are true:    You've been fever-free for more than 24 hours without using fever-reducing medications such as Tylenol    Your other symptoms have improved    It's been at least 5 full days since your symptoms first started   When to seek care   Call us at 1 [phone]   with any sudden or unexpected symptoms.    Symptoms that last longer than 10 to 14 days.    Symptoms that get better for a few days, and then suddenly get worse.    Fever that measures over 103F or continues for more than 3 days.    Any vision changes.    Your headache worsens.    Stiff neck.    Swelling of your forehead or eyes.    Coughing up red or bloody mucus.    Your throat pain becomes worse and makes swallowing extremely difficult or impossible.    More than 5 episodes of diarrhea in a day.    More than 5 episodes of vomiting in a day.    Severe shortness of breath.    Severe chest pain    You mentioned that your symptoms are making your asthma worse. If your inhaler doesn't help or your symptoms continue to get worse, contact us for an appointment.   Other treatment    Rest! Your body needs rest to recover and fight infection.    Drink plenty of water to stay hydrated.    Use steam to soothe your sinuses: Breathe it in from a shower or a bowl of hot water. Placing a warm, moist washcloth over your nose and forehead may help relieve the sinus pain and pressure.    Try non-prescription saline nasal sprays to help  your nasal symptoms. Try using a Neti Pot to flush out your stuffy nose and sinuses. Neti Pots are available at any drugstore without a prescription.    Avoid smoke and air pollution. Smoke can make infections worse.   Prevention    Avoid close contact with other people when you're sick.    Cover your mouth and nose when you cough or sneeze. Use a tissue or cough into your elbow. Make sure that used tissues go directly into the trash.    Avoid touching your eyes, nose, or mouth while you're sick.    Wash your hands often, especially after coughing, sneezing, or blowing your nose. If soap and water are not available, use an alcohol-based hand .    If you or someone in your home or workplace is sick, disinfect commonly used items. This includes door handles, tables, computers, remotes, and pens.    Coronavirus (COVID-19) information   Common symptoms of COVID-19 include fever, cough, shortness of breath, fatigue, muscle or body aches, headaches, new loss of sense of taste or smell, sore throat, stuffy or runny nose, nausea or vomiting, and diarrhea. Most people who get COVID-19 have mild symptoms and can rest at home until they get better. Elderly people and those with chronic medical problems may be at risk for more serious complications.   FAQs about the COVID-19 vaccine   There are three authorized COVID-19 vaccines: Ilir & Ilir's Rain Vaccine (J&J/Rain), Moderna, and Pfizer-Eurekster (Pfizer). The J&J/Rain and Moderna vaccines are approved for use in people aged 18 and older. The Pfizer vaccine is approved for those aged 5 and older. All three are available at no cost. Even if you don't have health insurance, you can still get the COVID-19 vaccine for free.   Which vaccine is the best? Which vaccine should I get?   All three vaccines are highly effective. Even if you get COVID-19 after being vaccinated, all of the vaccines help prevent severe disease, hospitalization, and complications.    Most people should get whichever vaccine is first available to them. However, women younger than 50 years old should consider the rare risk of blood clots with low platelets after vaccination with the J&J/Rain vaccine. This risk hasn't been seen with the other two vaccines.   Are the vaccines safe?   Yes. Hundreds of millions of people in the [place] have already safely received COVID-19 vaccines. As part of Phase 3 clinical trials in the [place] and other countries, researchers collected safety and efficacy data for all three vaccines. These clinical trials follow strict standards. Before a vaccine is approved, the manufacturing company must submit data to the Food and Drug Administration (FDA) for review. Tens of thousands of volunteers participated in the clinical trials for the vaccines. The FDA continues to monitor safety data as the vaccines are given to the general population.   Do I need the vaccine if I've already had COVID?   Yes. Vaccination helps protect you even if you've already had COVID.   If you had COVID-19 and had symptoms, wait to get vaccinated until ALL of the following are true:    5 full days since your symptoms started    24 hours with no fever, without the use of fever-reducing medications    Your other COVID-19 symptoms are improving   If you tested positive for COVID-19 but did not have symptoms, you can get vaccinated after 5 full days have passed since you had a positive test, as long as you don't develop symptoms.   If you had COVID-19 and were treated with monoclonal antibodies, you should wait 90 days before getting a COVID-19 vaccination.   How many doses of the vaccine do I need?   Visit https://www.cdc.gov/coronavirus/2019-ncov/vaccines/stay-up-to-date.html   to find out how to stay up to date with your COVID-19 vaccines.   I'm immunocompromised. How many doses of the vaccine do I need?   For information on how immunocompromised people can stay up to date with their COVID-19 vaccines,  visit https://www.cdc.gov/coronavirus/2019-ncov/vaccines/recommendations/immuno.html  .   What are the common side effects of the vaccine?   A sore arm, tiredness, headache, and muscle pain may occur within two days of getting the vaccine and last a day or two. For the Moderna or Pfizer vaccines, side effects are more common after the second dose. People over the age of 55 are less likely to have side effects than younger people.   After I'm up to date on vaccines, can I still get or spread COVID?   Yes, but your disease should be milder. And your risk of serious illness, hospitalization, and complications will be much lower, especially if you're up to date. Being vaccinated reduces the risk of spreading the disease if you get it.   After I'm up to date on vaccines, can I go back to normal?   You should still wear a mask indoors in public if:    It's required by laws, rules, regulations, or local guidance.    You have a weakened immune system.    Your age puts you at increased risk of severe disease.    You have a medical condition that puts you at increased risk of severe disease.    Someone in your household has a weakened immune system, is at increased risk for severe disease, or is unvaccinated.    You're in an area of high transmission.   Where can I get a COVID-19 vaccine?   Visit [facility]'s website for more information. To find a COVID-19 vaccination site near you, visit https://www.vaccines.gov/  , call [phone]  , or text your zip code to [phone] ([employer]). Message and data rates may apply.   What about travel?   For information before your trip, see https://www.cdc.gov/coronavirus/2019-ncov/travelers/index.html  . Travel increases your risk of exposure to COVID-19. For information on what to do after you've returned, see https://www.cdc.gov/coronavirus/2019-ncov/php/risk-assessment.html  .   I've had close contact with someone who has COVID. Do I need to quarantine, and if so, for how long?   For  the most current answer, including a calculator to determine whether you need to stay home and for how long, visit https://www.cdc.gov/coronavirus/2019-ncov/your-health/quarantine-isolation.html  .   I've had a positive test result for COVID. How long do I need to isolate?   For the latest recommendations, including a calculator to determine how long you need to stay home, visit https://www.cdc.gov/coronavirus/2019-ncov/your-health/quarantine-isolation.html  .   What if I develop symptoms that might be from COVID?   For the latest recommendations on what to do if you're sick, including when to seek emergency care, visit https://www.cdc.gov/coronavirus/2019-ncov/if-you-are-sick/steps-when-sick.html  .    Flu vaccine information   Who should get a flu vaccine?   Everyone 6 months of age and older should get a yearly flu vaccine.   When should I get vaccinated?   You should get a flu vaccine by the end of October. Once you're vaccinated, it takes about two weeks for antibodies to develop and protect you against the flu. That's why it's important to get vaccinated as soon as possible.   After October, is it too late to get vaccinated?   No. You should still get vaccinated. As long as the flu viruses are still in your community, flu vaccines will remain available, even into January of next year or later.   Why do I need a flu vaccine EVERY year?   Flu viruses are constantly changing, so flu vaccines are usually updated from one season to the next. Your protection from the flu vaccine also lessens over time.   Is the flu vaccine safe?   Yes. Over the last 50 years, hundreds of millions of Americans have safely received the flu vaccines.   What are the side effects of flu vaccines?   You CANNOT get the flu from a flu vaccine. Common side effects of the flu shot include soreness, redness and/or swelling where the shot was given, low grade fever, and aches. Common side effects of the nasal spray flu vaccine for adults  include runny nose, headaches, sore throat, and cough. For children, side effects include wheezing, vomiting, muscle aches, and fever.   Does the flu vaccine increase your risk of getting COVID-19?   No. There is no evidence that getting a flu vaccine increases your risk of getting COVID-19.   Is it safe to get the flu vaccine along with a COVID-19 vaccine?   Yes. It's safe to get the flu vaccine with a COVID-19 vaccine or booster.   Contact your healthcare provider TODAY for details on when and where to get your flu vaccine.   Your provider   Your diagnosis was provided by Kaya Feliz, a member of your trusted care team at [facility].   If you have any questions, call us at [phone]  .

## 2022-05-12 ENCOUNTER — TELEPHONE (OUTPATIENT)
Dept: FAMILY MEDICINE CLINIC | Facility: CLINIC | Age: 32
End: 2022-05-12

## 2022-05-12 NOTE — TELEPHONE ENCOUNTER
Please call patient and let her know that flow can fluctuate month-to-month.  I still recommend using ibuprofen and heating pad.    On a side note, I recommend using sunscreen with doxycycline as it can increase your risk of sunburn.

## 2022-05-12 NOTE — TELEPHONE ENCOUNTER
Please all patient and ask if she was pregnant. Bleeding intensity can vary based on how thick the inner lining of the uterus grew over the last 28 days. Passing clots can be a normal variant of menses. I recommend using ibuprofen 600 mg TID with food and heating pads to help with cramps.

## 2022-05-12 NOTE — TELEPHONE ENCOUNTER
Caller: Karma Steen     Relationship: SELF    Best call back number: [phone]     What is your medical concern?     PATIENT IS UNDER QUARANTINE DUE TO  BEING DIAGNOSED WITH COVID.  HOWEVER SHE IS HAVING SOME SERIOUS BLEEDING AND CRAMPING ISSUES AND PASSING LARGE CLUMPS THE FIRST FEW DAYS AND NOW IT IS HEAVY BLEEDING.  SHE IS CONCERNED IF IT IS THE PCLS OR POSSIBLE MISCARRIAGE.    PLEASE CALL PATIENT AND ADVISE.

## 2022-05-12 NOTE — TELEPHONE ENCOUNTER
About 3 weeks ago she started having some spotting that was light and was only when she would go to the restroom. Lasted 4 days. Last week she started having light spotting and had pieces that were long and stringy, cramping that lasted 2-3 days. She had the stringy pieces when she would go to the restroom but had bleeding the whole time. She has since started having heavy bleeding and cramps for the past week. She does have PCOS. Her last menstrual cycle was 4/7. Spotting started on 4/21. She wanted to let you know she was prescribed antibiotics(Doxycycline) yesterday for sinus infection. No pregnancy that she is aware of .

## 2022-05-20 NOTE — PROGRESS NOTES
Subjective   Karma Steen is a 32 y.o. female.       HPI   Pt is here today for follow up on weight loss medication.   Completed month #1 with phentermine.   Current weight is 278.  Weight is down 12 pounds.    BMI is 47.    Working on healthy diet, exercise daily.    Denies any CP; palpitations; SOA; dizziness; headache; trouble with vision.     Feels she has sinus for 3 weeks; did a tele-health visit last week; completed a round of doxycycline and completed this yesterday.  She also takes Xyzal daily and has used sinus rinse and Mucinex.  She has head/nasal congestion; yellow drainage; sinus pressure; ear pressure.  No fevers.  Has had negative COVID test at home.      The following portions of the patient's history were reviewed and updated as appropriate: allergies, current medications, past family history, past medical history, past social history, past surgical history and problem list.    Review of Systems   Constitutional: Negative for chills, fatigue and fever.   HENT: Positive for congestion, ear pain, postnasal drip, rhinorrhea, sinus pressure and sore throat. Negative for ear discharge, sneezing, swollen glands and trouble swallowing.    Eyes: Negative for blurred vision and visual disturbance.   Respiratory: Negative for cough, chest tightness, shortness of breath and wheezing.    Cardiovascular: Negative for chest pain and palpitations.   Gastrointestinal: Negative for blood in stool, diarrhea, nausea, vomiting and indigestion.   Endocrine: Negative for polydipsia, polyphagia and polyuria.   Genitourinary: Negative for difficulty urinating, dysuria, flank pain, frequency and urgency.   Musculoskeletal: Negative for arthralgias and myalgias.   Neurological: Negative for dizziness, weakness and headache.   Psychiatric/Behavioral: Negative for depressed mood. The patient is not nervous/anxious.        Objective   Physical Exam  Vitals reviewed.   Constitutional:       General: She is not in acute  distress.     Appearance: Normal appearance.   HENT:      Head: Normocephalic and atraumatic.      Right Ear: Hearing, ear canal and external ear normal. Tympanic membrane is erythematous.      Left Ear: Hearing, ear canal and external ear normal. Tympanic membrane is erythematous.      Nose: Congestion and rhinorrhea present.      Right Sinus: Maxillary sinus tenderness and frontal sinus tenderness present.      Left Sinus: Maxillary sinus tenderness and frontal sinus tenderness present.      Mouth/Throat:      Lips: Pink.      Mouth: Mucous membranes are moist.      Pharynx: Posterior oropharyngeal erythema present. No pharyngeal swelling, oropharyngeal exudate or uvula swelling.      Comments: Postnasal drip  Eyes:      General:         Right eye: No discharge.         Left eye: No discharge.      Conjunctiva/sclera: Conjunctivae normal.   Cardiovascular:      Rate and Rhythm: Normal rate and regular rhythm.      Pulses: Normal pulses.      Heart sounds: Normal heart sounds. No murmur heard.  Pulmonary:      Effort: Pulmonary effort is normal. No respiratory distress.      Breath sounds: Normal breath sounds. No wheezing or rhonchi.   Chest:      Chest wall: No tenderness.   Abdominal:      Tenderness: There is no right CVA tenderness or left CVA tenderness.   Musculoskeletal:      Cervical back: Normal range of motion and neck supple. No tenderness.      Right lower leg: No edema.      Left lower leg: No edema.   Lymphadenopathy:      Cervical: No cervical adenopathy.   Skin:     General: Skin is warm and dry.      Findings: No erythema.   Neurological:      General: No focal deficit present.      Mental Status: She is alert and oriented to person, place, and time.   Psychiatric:         Mood and Affect: Mood normal.           Assessment & Plan   Diagnoses and all orders for this visit:    1. Obesity, Class III, BMI 40-49.9 (morbid obesity) ([facility]) (Primary)  Comments:  Cont. Phentermine (month #2).   Cont.  working on healthy diet; exercise at least 150 minutes weekly.   RTO in 4 weeks.   Orders:  -     phentermine 37.5 MG capsule; Take 1 capsule by mouth Every Morning.  Dispense: 30 capsule; Refill: 0    2. Upper respiratory tract infection, unspecified type  Comments:  Given Levaquin.    Given Flonase.    Cont. Xyzal.   Increase fluids and rest.   Call for worsening.   Orders:  -     fluticasone (Flonase) 50 MCG/ACT nasal spray; 2 sprays into the nostril(s) as directed by provider Daily.  Dispense: 15.8 mL; Refill: 3  -     levoFLOXacin (Levaquin) 500 MG tablet; Take 1 tablet by mouth Daily for 7 days.  Dispense: 7 tablet; Refill: 0    3. Moderate persistent asthma without complication  Comments:  Cont. current medication.   Refill sent.  Orders:  -     budesonide-formoterol (SYMBICORT) 160-4.5 MCG/ACT inhaler; Inhale 2 puffs 2 (Two) Times a Day. Rinse mouth after each use.  Dispense: 10.2 each; Refill: 5

## 2022-05-23 ENCOUNTER — TELEPHONE (OUTPATIENT)
Dept: FAMILY MEDICINE CLINIC | Facility: CLINIC | Age: 32
End: 2022-05-23

## 2022-05-23 NOTE — TELEPHONE ENCOUNTER
PATIENT IS CALLING IN STATING THAT SHE HAD A VIRTUAL VISIT A WEEK AGO WITH DR STAUFFER.  SHE WENT THROUGH THE [facility] [employer] [facility] TO SCHEDULE THE VISIT AND WAS PUT ON A ANTIBIOTIC BUT SHE IS STILL HAVING THE SAME SYMPTOMS THAT SHE WAS HAVING.  SHE IS HAVING CONGESTION, EARACHE, HEADACHE, PRESSURE IN FACE AND SHE WANTS TO KNOW IF DR BRASHER WANTS HER TO COME IN FOR A VISIT OR IF SHE NEEDS TO BE ON A DIFFERENT MEDICATION.    PATIENT CALL BACK  [phone]  PHARMACY  [facility]  [address] [facility]  [phone]

## 2022-05-23 NOTE — TELEPHONE ENCOUNTER
Please call patient and let her know that I recommend scheduling in appointment to be seen and evaluate her symptoms.    In the interim I recommend using Flonase, 1 spray in each nostril, daily.  Please remember not to sniff/inhaled medication. I recommend no more than 3000 mg Tylenol (acetaminophen) in 24 hours for pain/headache. Extra strength Tylenol (acetaminophen) is 500 mg. You can take 2 tablets up to 3 times per day as needed for pain.

## 2022-05-24 ENCOUNTER — OFFICE VISIT (OUTPATIENT)
Dept: FAMILY MEDICINE CLINIC | Facility: CLINIC | Age: 32
End: 2022-05-24

## 2022-05-24 VITALS
OXYGEN SATURATION: 97 % | WEIGHT: 278 LBS | HEIGHT: 64 IN | HEART RATE: 86 BPM | DIASTOLIC BLOOD PRESSURE: 69 MMHG | BODY MASS INDEX: 47.46 KG/M2 | SYSTOLIC BLOOD PRESSURE: 100 MMHG

## 2022-05-24 DIAGNOSIS — E66.01 OBESITY, CLASS III, BMI 40-49.9 (MORBID OBESITY): Primary | ICD-10-CM

## 2022-05-24 DIAGNOSIS — J45.40 MODERATE PERSISTENT ASTHMA WITHOUT COMPLICATION: ICD-10-CM

## 2022-05-24 DIAGNOSIS — J06.9 UPPER RESPIRATORY TRACT INFECTION, UNSPECIFIED TYPE: ICD-10-CM

## 2022-05-24 PROCEDURE — 99214 OFFICE O/P EST MOD 30 MIN: CPT | Performed by: NURSE PRACTITIONER

## 2022-05-24 RX ORDER — BUDESONIDE AND FORMOTEROL FUMARATE DIHYDRATE 160; 4.5 UG/1; UG/1
2 AEROSOL RESPIRATORY (INHALATION)
Qty: 10.2 EACH | Refills: 5 | Status: SHIPPED | OUTPATIENT
Start: 2022-05-24 | End: 2023-03-20 | Stop reason: SDUPTHER

## 2022-05-24 RX ORDER — FLUTICASONE PROPIONATE 50 MCG
2 SPRAY, SUSPENSION (ML) NASAL DAILY
Qty: 15.8 ML | Refills: 3 | Status: SHIPPED | OUTPATIENT
Start: 2022-05-24 | End: 2022-08-24 | Stop reason: SDUPTHER

## 2022-05-24 RX ORDER — LEVOFLOXACIN 500 MG/1
500 TABLET, FILM COATED ORAL DAILY
Qty: 7 TABLET | Refills: 0 | Status: SHIPPED | OUTPATIENT
Start: 2022-05-24 | End: 2022-05-31

## 2022-05-24 RX ORDER — PHENTERMINE HYDROCHLORIDE 37.5 MG/1
37.5 CAPSULE ORAL EVERY MORNING
Qty: 30 CAPSULE | Refills: 0 | Status: SHIPPED | OUTPATIENT
Start: 2022-05-24 | End: 2022-07-05

## 2022-06-24 DIAGNOSIS — J45.40 MODERATE PERSISTENT ASTHMA WITHOUT COMPLICATION: ICD-10-CM

## 2022-06-24 RX ORDER — ALBUTEROL SULFATE 90 UG/1
2 AEROSOL, METERED RESPIRATORY (INHALATION) EVERY 6 HOURS PRN
Qty: 18 G | Refills: 5 | Status: SHIPPED | OUTPATIENT
Start: 2022-06-24 | End: 2023-03-20 | Stop reason: SDUPTHER

## 2022-06-24 NOTE — TELEPHONE ENCOUNTER
Caller: Karma Steen    Relationship: Self    Best call back number: [phone] (H)    Requested Prescriptions:   Requested Prescriptions     Pending Prescriptions Disp Refills   • albuterol sulfate  (90 Base) MCG/ACT inhaler 18 g 5     Sig: Inhale 2 puffs Every 6 (Six) Hours As Needed for Wheezing or Shortness of Air.        Pharmacy where request should be sent: [facility]  [facility] IN [address] [phone] [facility] [phone]      Additional details provided by patient: PATIENT IS COMPLETELY OUT OF THE MEDICATION AND NEEDS TODAY 06/24/2022    Does the patient have less than a 3 day supply:  [x] Yes  [] No    Nathanael Zhang   06/24/22 16:04 EDT

## 2022-07-05 ENCOUNTER — TELEPHONE (OUTPATIENT)
Dept: FAMILY MEDICINE CLINIC | Facility: TELEHEALTH | Age: 32
End: 2022-07-05

## 2022-07-05 ENCOUNTER — TELEMEDICINE (OUTPATIENT)
Dept: FAMILY MEDICINE CLINIC | Facility: TELEHEALTH | Age: 32
End: 2022-07-05

## 2022-07-05 ENCOUNTER — E-VISIT (OUTPATIENT)
Dept: FAMILY MEDICINE CLINIC | Facility: TELEHEALTH | Age: 32
End: 2022-07-05

## 2022-07-05 DIAGNOSIS — U07.1 COVID-19: Primary | ICD-10-CM

## 2022-07-05 PROCEDURE — 99213 OFFICE O/P EST LOW 20 MIN: CPT | Performed by: NURSE PRACTITIONER

## 2022-07-05 NOTE — TELEPHONE ENCOUNTER
Spoke with patient this am. Discussed interaction with her meds and Paxlovid. Pt denies any SOA this am. Reports she would like to hold off on steroid and antivirals at this time. Reports she will followup with her PCP.

## 2022-07-05 NOTE — PROGRESS NOTES
You have chosen to receive care through a telehealth visit.  Do you consent to use a video/audio connection for your medical care today? Yes     CHIEF COMPLAINT  Chief Complaint   Patient presents with   • Generalized Body Aches         HPI  Karma Steen is a 32 y.o. female  presents with complaint of COVID 19. Reports she is positive for COVID 19 using an at home test. Reports she has lost her taste and smell. Runny nose, fatigue. Reports she is having chills. No fever. Reports she has been using her inhaler ever 4 hours. Denies any CP. Mild SOA. Reports her symptoms started 2 days ago. Reports she has not used any medications for her symptoms.     Review of Systems   Constitutional: Positive for chills and fatigue. Negative for fever.   HENT: Positive for rhinorrhea and sore throat. Negative for congestion, ear discharge, ear pain, sinus pressure and sinus pain.    Respiratory: Positive for shortness of breath. Negative for cough, chest tightness and wheezing.         Mild SOA    Cardiovascular: Negative for chest pain.   Gastrointestinal: Negative for abdominal pain, diarrhea, nausea and vomiting.   Musculoskeletal: Negative for back pain and myalgias.   Neurological: Negative for dizziness and headaches.   Psychiatric/Behavioral: Negative.        Past Medical History:   Diagnosis Date   • Anxiety    • Asthma    • Exposure to COVID-19 virus 01/02/2022   • Migraine    • MRSA (methicillin resistant Staphylococcus aureus)    • PCOS (polycystic ovarian syndrome)    • PE (pulmonary thromboembolism) ([facility]) 2019   • Staph infection    • Tachycardia        Family History   Problem Relation Age of Onset   • Heart disease Mother    • Migraines Mother    • Heart disease Father    • Breast cancer Maternal Aunt    • Diabetes Paternal Grandmother    • Hypertension Paternal Grandmother        Social History     Socioeconomic History   • Marital status:    Tobacco Use   • Smoking status: Never Smoker   • Smokeless  tobacco: Never Used   Vaping Use   • Vaping Use: Never used   Substance and Sexual Activity   • Alcohol use: Yes     Comment: very rare   • Drug use: Yes     Types: Marijuana   • Sexual activity: Yes     Partners: Male       Karma Steen  reports that she has never smoked. She has never used smokeless tobacco.. I have educated her on the risk of diseases from using tobacco products such as cancer, COPD and heart disease.       I spent 1 minutes counseling the patient.              LMP 06/05/2022   Breastfeeding No     PHYSICAL EXAM  Physical Exam   Constitutional: She is oriented to person, place, and time. She appears well-developed and well-nourished. No distress.   HENT:   Head: Normocephalic and atraumatic.   Right Ear: Hearing normal.   Left Ear: Hearing normal.   Nose: Right sinus exhibits no maxillary sinus tenderness. Left sinus exhibits no maxillary sinus tenderness.   Mouth/Throat: Mouth/Lips are normal.Oropharynx is clear and moist.   Patient directed exam   Eyes: Conjunctivae and lids are normal.   Pulmonary/Chest: Effort normal.  No respiratory distress.  Lymphadenopathy:        Right cervical: Anterior cervical adenopathy present.        Left cervical: Anterior cervical adenopathy present.   Slight enlarged lymph nodes    Neurological: She is alert and oriented to person, place, and time. She has normal strength.   Psychiatric: She has a normal mood and affect. Her speech is normal and behavior is normal.       Results for orders placed or performed during the hospital encounter of 01/02/22   COVID-19,APTIMA PANTHER([facility]),[facility], NP/OP SWAB IN UTM/VTM/[facility] [employer] MEDIA,24 HR TAT - Swab, Nasopharynx    Specimen: Nasopharynx; Swab   Result Value Ref Range    COVID19 Not Detected Not Detected - Ref. Range       Diagnoses and all orders for this visit:    1. COVID-19 (Primary)    rest  Fluids  Spoke to patient regarding Paxlovid. Note multiple drug interactions with patients medication. Not  a candidate for this medication.   PCP if symptoms persist  ER for worsening symptoms such as high fever, chest pain or SOA.        FOLLOW-UP  As discussed during visit with PCP/[facility] if no improvement or Urgent Care/Emergency Department if worsening of symptoms    Patient verbalizes understanding of medication dosage, comfort measures, instructions for treatment and follow-up.    Teresa Leblanc, JOE  07/05/2022  03:23 EDT    The use of a video visit has been reviewed with the patient and verbal informed consent has been obtained. Myself and Karma Steen participated in this visit. The patient is located in [address] [place] [address] [place] [facility].    I am located in [place], [place]. Mychart and Zoom were utilized. I spent 7 minutes in the patient's chart for this visit.

## 2022-07-05 NOTE — E-VISIT ESCALATED
Patient escalated   Provider Teresa Leblanc chose to escalate patient to another level of care because: Patient's free text comments   Patient was sent the following message:   Based on the information you've provided us, you need to take another step to get care. have further questions regarding your difficulty breathing   What to do now:   Setup a video visit   Please, schedule your video visit   Video visit     You won't be charged for your eVisit. If you paid with a credit card, the charge will be reversed.   Chief Complaint: Coronavirus (COVID-19), cold, sinus pain, allergy, or flu   Patient introduction   Patient is 32-year-old female with shortness of breath, congestion, nasal discharge, itchy or watery eyes, new loss of smell or taste, headache, fatigue, and diarrhea that started 3 to 5 days ago.   When asked why they're seeking care online today, patient responds that they want to know if they have a cold or something more serious, want to know if they need to be seen by a provider, want to get tested for COVID-19, and just want to feel better.   COVID-19 exposure, testing history, and vaccination status:    Patient had a self-test on 07/04/2022. Test result was positive.    Received 2 doses of the COVID-19 vaccine (Pfizer, Pfizer).   Received their most recent dose of the vaccine more than 14 days ago.   Risk factors for severe disease from COVID-19 infection:    BMI >= 40.    Asthma.    Chronic lung disease.   Warning. The following may warrant further investigation:    Using albuterol every 3 to 4 hours.   Patient-submitted comments: Lost taste and smell within the last 48 hours, no fever but incredibly tired and having slight breathing difficulties that I thought was originally maybe because of the heat. .   Patient did not request an excuse note.   General presentation   Symptoms came on suddenly.   Fever:    No fever.   Sinus and nasal symptoms:    Nasal discharge.    Clear nasal drainage.    Nasal  drainage is thin.    Congestion without associated pain or pressure.    No itchy nose or sneezing.    No postnasal drip.   Throat symptoms:    No sore throat.   Head and body aches:    Headache, described as mild (1 to 3 on a scale of 1 to 10).    Fatigue.    No sweats.    No chills.    No myalgia.   Cough:    No cough.   Wheezing and shortness of breath:    Has asthma diagnosis.    Wheezing.    Current cold symptoms aggravate their asthma.    Mild shortness of breath that does not affect ADLs. Despite shortness of breath, patient can speak normally. Despite shortness of breath, patient can take a full, deep breath (inhaling for 3 to 4 seconds).    Using an albuterol inhaler and an inhaled steroid with long-acting bronchodilator for asthma.    Using albuterol every 3 to 4 hours.    No COPD diagnosis.   Chest pain:    No chest pain.   Ear symptoms:    None.   Dizziness:    Mild dizziness that does not interfere with daily activities.   Flu exposure:    Received a flu vaccine more than 6 months ago.   Patient denies the following red flags:    Severe shortness of breath.    Inability to repeat a sentence without stopping for breath.    Inability to take a full breath lasting 3 to 4 seconds.    Changes in alertness or awareness.    Symptoms suggesting respiratory distress.    Symptoms suggesting intracranial hemorrhage.    Decreased urination.   Pregnancy/menstrual status/breastfeeding:    Not pregnant.    Not breastfeeding.    Regarding last menstrual period, patient writes: Due to start period most recent period about a month ago but have PCOS so they can be irregular.   Self-exam:    No difficulty moving their chin toward their chest.    Swollen and tender neck lymph nodes.    No periorbital edema.   Current medications   Not taking any over-the-counter medications for current symptoms.   Not taking any other medications or supplements.   Medication allergies    Cephalosporins.   Medication contraindication review    Patient may be eligible for treatment with Paxlovid. Use of Paxlovid with drugs that either induce CYP3 or are highly dependent on CYP3 for clearance may lead to significant drug interactions. Patient is willing to take Paxlovid.   Patient has history of depression. Therefore, the following medication(s) will not be prescribed:    Metoclopramide.   No history of metoclopramide-associated dystonic reaction and tardive dyskinesia.   No known history of amoxicillin-clavulanate-associated cholestatic jaundice or hepatic impairment.   No known history of azithromycin-associated cholestatic jaundice or hepatic impairment.   Past medical history   Immune conditions: No immunocompromising conditions. No history of cancer.   Social history   High-risk household contacts: Patient's household includes one or more persons with asthma and diabetes.   Doesn't smoke.   Assessment:   Patient determined to need a level of care not appropriate to be delivered through eVisit.   Plan:   Patient informed of need to seek in-person care   ----------   Electronically signed by JOE Cuellar on 2022-07-05 at 00:17AM   ----------   Patient Interview Transcript:   Why are you getting care through eVisit today? We can't guarantee a specific treatment or test. Your provider will decide what's best for you. Select all that apply.    I want to know if I have a cold or something more serious    I want to know if I need to be seen by a provider    I want to be tested for COVID-19    I just want to feel better!   Not selected:    I need a doctor's note    I want to get the COVID-19 vaccine    I think I'm having side effects from the COVID-19 vaccine    None of the above   Do any of these statements apply to you? Select all that apply.    None of the above   Not selected:    The public health department directed me to get a COVID-19 test    My employer directed me to get a COVID-19 test    I need to get a COVID-19 test BEFORE I travel    I need  to get a COVID-19 test AFTER traveling in the last week   COVID-19 symptoms are similar to symptoms of other illnesses. This makes it difficult to diagnose. Please carefully consider each question and answer as best you can. This will help your provider make the right diagnosis. Which of these symptoms are bothering you? Select all that apply.    Shortness of breath    Stuffed-up nose or sinuses    Runny nose    Itchy or watery eyes    Loss of smell or taste    Headache    Fatigue or tiredness    Diarrhea   Not selected:    Cough    Fever    Itchy nose or sneezing    Sore throat    Hoarse voice or loss of voice    Sweats    Chills    Muscle or body aches    Nausea or vomiting    I don't have any of these symptoms   Do you have any of these conditions? These conditions can put you at increased risk for severe disease if you're infected with COVID-19. Select all that apply.    Chronic lung disease, such as cystic fibrosis or interstitial fibrosis   Not selected:    Heart disease, such as congenital heart disease, congestive heart failure, or coronary artery disease    Disorder of the brain, spinal cord, or nerves and muscles, such as dementia, cerebral palsy, epilepsy, muscular dystrophy, or developmental delay    Metabolic disorder or mitochondrial disease    Cerebrovascular disease, such as stroke or another condition affecting the blood vessels or blood supply to the brain    Down syndrome    Mood disorder, including depression or schizophrenia spectrum disorders    Substance use disorder, such as alcohol, opioid, or cocaine use disorder    Tuberculosis    None of the above   Do you live in a group care setting? Examples include: - Nursing home - Residential care - Psychiatric treatment facility - Group home - [facility] - Board and care home - Homeless shelter - Foster care setting Select one.    No   Not selected:    Yes   Have you ever been tested for COVID-19? Select one.    Yes   Not selected:    No   When was  your most recent COVID-19 test? Select one.    Within the last week (specify date as MM/DD/YY): 07/04/2022   Not selected:    7 to 14 days ago    15 to 30 days ago    1 to 3 months ago    More than 3 months ago   What type of COVID-19 test did you most recently have? There are two types of COVID-19 tests: - Viral tests check if you're currently infected with COVID-19. For these tests, a nose swab or saliva sample is taken. Viral tests include self-tests and tests done at a doctor's office, lab, or testing site. - Antibody tests check if you've been infected in the past. For these tests, your blood is drawn. Antibody tests can only be done at a doctor's office, lab, or testing site. Select one.    Viral self-test   Not selected:    Viral test at a doctor's office, lab, or testing site    Antibody test   What was the result of your most recent COVID-19 test? Select one.    Positive   Not selected:    Negative    I'm not sure   Have you gotten the COVID-19 vaccine? Select one.    Yes   Not selected:    No   How many doses of the COVID-19 vaccine have you gotten? This includes boosters as well as third or fourth doses for those who are immunocompromised. Select one.    2 doses   Not selected:    1 dose    3 doses    4 doses   1st dose    Pfizer   Not selected:    J&J/Rain    Moderna   2nd dose    Pfizer   Not selected:    J&J/Rain    Moderna   When did you get your most recent dose of the COVID-19 vaccine?    More than 14 days ago   Not selected:    Less than 48 hours (2 days) ago    48 to 72 hours (3 days) ago    3 to 5 days ago    5 to 7 days ago    7 to 14 days ago   When did your current symptoms start? Select one.    3 to 5 days ago   Not selected:    Less than 48 hours ago    6 to 9 days ago    10 to 14 days ago    2 to 4 weeks ago    More than a month ago   Do you know the exact date your symptoms started? If so, enter the date as MM/DD/YY. Select one.    No   Not selected:    Yes (specify)   Did your  "symptoms come on suddenly or gradually? Select one.    Suddenly   Not selected:    Gradually    I'm not sure   How would you describe your shortness of breath? Sometimes shortness of breath can be a sign of a more serious condition. Select one.    Mild (about what I normally have with a cold)   Not selected:    Moderate (it's bad, but I can still do simple things like get dressed, bathe, or comb my hair)    Severe (it's so bad that I can't do simple things like get dressed, bathe, or comb my hair)   We'd like to find out more about your shortness of breath. Try to say the following sentence out loud: \"I went to the store today to buy bread, milk, and eggs.\" Are you able to get to the end of the sentence without stopping for breath? If not, you may need emergency care.    Yes   Not selected:    No   Are you able to take a full, deep breath? A full, deep breath means inhaling for 3 to 4 seconds. If you can't do this, you may need to seek emergency care. Select one.    Yes   Not selected:    No   You mentioned having a headache. On a scale of 1 to 10, how severe is your headache pain? Select one.    Mild (1 to 3)   Not selected:    Moderate (4 to 6)    Severe (7 to 9)    Unbearable (10)    The worst headache of my life (10+)   You mentioned having a stuffy nose or sinus congestion. Do you feel pain or pressure in your sinuses?    No   Not selected:    Yes    I'm not sure   What color is your nasal drainage? Select one.    Clear   Not selected:    White    Yellow    Green    My nose is stuffed but not draining or running    I'm not sure   Is your nasal drainage thick or thin? Select one.    Thin   Not selected:    Thick    I'm not sure   Is there any drainage (mucus) going down the back of your throat? This kind of drainage is also called \"postnasal drip.\" Select one.    No   Not selected:    Yes    I'm not sure   Since your symptoms started, have you felt dizzy? Select one.    Yes, but I can continue with my regular " daily activities   Not selected:    Yes, and it makes it hard to stand, walk, or do daily activities    No   Do you have chest pain? You might also feel it as discomfort, aching, tightness, or squeezing in the chest. Select one.    No   Not selected:    Yes   Have you urinated at least 3 times in the last 24 hours? Select one.    Yes   Not selected:    No    I'm not sure   Changes in alertness or awareness may mean you need emergency care. Since your symptoms started, have you had any of these? Select all that apply.    None of the above   Not selected:    Confusion    Slurred speech    Not knowing where you are or what day it is    Difficulty staying conscious    Fainting or passing out   Do your symptoms include a whistling sound, or wheezing, when you breathe? Select one.    Yes   Not selected:    No    I'm not sure   Are your eyelids or the areas around your eyes puffy? Select one.    No   Not selected:    Yes, but I can easily open my eye(s)    Yes, and it's hard to open my eye(s)    Yes, and my eye(s) are completely swollen shut   Do you have any of these symptoms in your ear(s)? Select all that apply.    None of the above   Not selected:    Pain    Pressure    Fullness    Crackling or popping    Plugged or blocked sensation   Can you move your chin toward your chest?    Yes   Not selected:    No, my neck is too stiff   Are your glands/lymph nodes swollen, or does it hurt when you touch them?    Yes   Not selected:    No    I'm not sure   People with a very high body mass index (BMI) are at higher risk for developing complications from the flu and severe illness from COVID-19. To determine your BMI, we need to know your weight and height. Please enter your weight (in pounds).    Weight   Please enter your height.    Height   Have you ever been diagnosed with asthma? Select one.    Yes   Not selected:    No   Are your cold symptoms making your asthma worse? Select one.    Yes   Not selected:    No   What  medications or inhalers are you currently using for your asthma? Select all that apply.    Albuterol inhaler, as needed (such as ProAir, Proventil, Ventolin)    Inhaled steroid with long-acting bronchodilator (such as Advair, Dulera, Symbicort)   Not selected:    Inhaled steroid (such as Qvar, Pulmicort, Flovent)    I'm not sure    I'm not taking any medications for my asthma    I usually take medications for my asthma, but I ran out   How often are you using albuterol? If you're using albuterol very frequently, you'll need to be seen in person. Select one.    Every 3 to 4 hours   Not selected:    More than once an hour    Every 1 to 2 hours    Every 2 to 3 hours    Every 4 to 6 hours    Every 6 hours or longer   Would you like a prescription for albuterol? Select one.    No   Not selected:    Yes   Have you ever been diagnosed with chronic obstructive pulmonary disease (COPD)? Select one.    No   Not selected:    Yes    I'm not sure   Have you had a flu shot this season? Select one.    Yes, more than 6 months ago   Not selected:    Yes, less than 2 weeks ago    Yes, 2 to 4 weeks ago    Yes, 1 to 3 months ago    Yes, 3 to 6 months ago    I'm not sure    No   The flu and COVID-19 can be more serious for people with certain conditions or characteristics. These questions help us figure out if you or anyone you live with is at higher risk for complications from these infections. Do either of these statements apply to you? Select all that apply.    None of the above   Not selected:    I'm  or Native [place]    I'm a healthcare worker   Do you smoke tobacco? Select one.    No   Not selected:    Yes, every day    Yes, some days    No, I quit   Do you have any of these conditions that can affect the immune system? Scroll to see all options. Select all that apply.    None of these   Not selected:    History of bone marrow transplant    Chronic kidney disease    Chronic liver disease (including cirrhosis)     HIV/AIDS    Inflammatory bowel disease (Crohn's disease or ulcerative colitis)    Lupus    Moderate to severe plaque psoriasis    Multiple sclerosis    Rheumatoid arthritis    Sickle cell anemia    Alpha or beta thalassemia    History of solid organ transplant (kidney, liver, or heart)    History of spleen removal    An autoimmune disorder not listed here    A condition requiring treatment with long-term use of oral steroids (such as prednisone, prednisolone, or dexamethasone)   Have you ever been diagnosed with cancer? Select one.    No   Not selected:    Yes, I have cancer now    Yes, but I'm in remission   Some conditions can put you at risk for more serious infections. Do any of these apply to you? Select all that apply.    None of the above   Not selected:    I've been hospitalized within the last 5 days    I have diabetes    I'm in close contact with a child in    Are you currently being treated for any of these conditions? Scroll to see all options. Select all that apply.    Depression   Not selected:    Aspirin triad (also known as Samter's triad or ASA triad)    Asthma or hives from taking aspirin or other NSAIDs, such as ibuprofen or naproxen    Blockage or narrowing of the blood vessels of the heart    Blood dyscrasia, such anemia, leukemia, lymphoma, or myeloma    Bone marrow depression    Catecholamine-releasing paraganglioma    Blood clotting disorder    Congenital long QT syndrome    Difficulty urinating or completely emptying your bladder    Uncorrected electrolyte abnormalities    Fungal infection    Gastrointestinal (GI) bleeding    Gastrointestinal (GI) obstruction    G6PD deficiency    Recent heart attack    High blood pressure    Irregular heartbeat or heart rhythm    Mononucleosis (mono)    Myasthenia gravis    Parkinson's disease    Pheochromocytoma    Reye syndrome    Seizure disorder    Ulcerative colitis    None of the above   Have you ever had either of these conditions? Select all  that apply.    No   Not selected:    Metoclopramide-associated dystonic reaction    Tardive dyskinesia   Are you pregnant? Select one.    No   Not selected:    Yes   When was your last menstrual period? If you don't currently have periods or no longer have periods, please briefly explain.    Due to start period most recent period about a month ago but have PCOS so they can be irregular   Within the last 2 weeks, have you: - Given birth - Had a miscarriage - Had a pregnancy loss - Had an  Being postpartum (live birth or loss) within the last 2 weeks increases your risk of flu complications. Select one.    No   Not selected:    Yes   Are you breastfeeding? Select one.    No   Not selected:    Yes   Do any of these apply to the people who live with you? Select all that apply.    None of the above   Not selected:    A child under the age of 5    An adult 65 or older    A person who is pregnant    A person who has given birth, had a miscarriage, had a pregnancy loss, or had an  in the last 2 weeks    An  or Native [place]   Does any member of your household have any of these medical conditions? Select all that apply.    Asthma    Diabetes   Not selected:    Disorders of the brain, spinal cord, or nerves and muscles, such as dementia, cerebral palsy, epilepsy, muscular dystrophy, or developmental delay    Chronic lung disease, such as COPD or cystic fibrosis    Heart disease, such as congenital heart disease, congestive heart failure, or coronary artery disease    Cerebrovascular disease, such as stroke or another condition affecting the blood vessels or blood supply to the brain    Blood disorders, such as sickle cell disease    Metabolic disorders such as inherited metabolic disorders or mitochondrial disease    Kidney disorders    Liver disorders    Weakened immune system due to illness or medications such as chemotherapy or steroids    Children under the age of 19 who are on long-term  aspirin therapy    Extreme obesity (BMI > 40)    None of the above   If your provider determines that you're eligible for treatment with Paxlovid, would you be willing to take the medication? This doesn't guarantee that you'll be prescribed Paxlovid. Your provider will make the final decision on the best treatment for you. Select one.    Yes   Not selected:    No    I'm not sure   Just a few more questions about medications, and then you're finished. Have you used any non-prescription medications or nasal sprays for your current symptoms? Examples include saline sprays, decongestants, NyQuil, and Tylenol. Select one.    No   Not selected:    Yes   Have you taken any monoamine oxidase inhibitor (MAOI) medications in the last 14 days? Examples include rasagiline (Azilect), selegiline (Eldepryl, Zelapar), isocarboxazid (Marplan), phenelzine (Nardil), and tranylcypromine (Parnate). Select one.    No, not that I know of   Not selected:    Yes   Do you take Kynmobi or Apokyn (apomorphine)? Select one.    No   Not selected:    Yes    I'm not sure   Are you taking any other medications or supplements? On the next screen, you need to list all vitamins, supplements, non-prescription medications (such as aspirin or Aleve), and prescription medications that you're taking. Select one.    No   Not selected:    Yes    Yes, but I'm not sure what they are   Have you ever had an allergic or bad reaction to any medication? Select one.    Yes   Not selected:    No   Have you had an allergic or bad reaction to any of these medications? Select all that apply.    No, not that I know of   Not selected:    Baloxavir (Xofluza)    Benzonatate (Tessalon Perles)    Fluconazole, itraconazole, or terconazole (brands include Diflucan, Sporanox, Terazol)    Oseltamivir (Tamiflu) or zanamivir (Relenza)    Paxlovid, nirmatrelvir, or ritonavir (Norvir)   Have you had an allergic or bad reaction to any of these antibiotic medications? Select all that  "apply.    Cephalexin or any \"cef-\" antibiotic, such as cefazolin, cefdinir, cefuroxime, ceftriaxone, ceftazidime, or cefepime (Brands include Ancef, Ceftin, Fortaz, Keflex, Maxipime, Rocephin, and Simplicef)   Not selected:    Penicillin or any \"-cillin\" antibiotic, such as amoxicillin, ampicillin, dicloxacillin, nafcillin, or piperacillin (Brands include Augmentin, Unasyn, and Zosyn)    Tetracycline or any \"-cycline\" antibiotic, such as doxycycline, demeclocycline, minocycline (Brands include Declomycin, Doryx, Dynacin, Oracea, Monodox, Panmycin, and Vibramycin)    Ciprofloxacin or any \"-floxacin\" antibiotic, such as gemifloxacin, levofloxacin, moxifloxacin, or ofloxacin (Brands include Factive, Cipro, Floxin, and Levaquin)    Azithromycin or any \"-thromycin\" antibiotic, such as erythromycin or clarithromycin (Brands include Biaxin, Erythrocin, Z-jose a, and Zithromax)    Clindamycin or lincomycin (Brands include Cleocin and Lincocin)    None of the above    I'm not sure   Have you had an allergic or bad reaction to any of these medications? Select all that apply.    None of the above   Not selected:    Albuterol or a similar medication    Corticosteroid (steroid) medication, including topical steroids, inhaled steroids, nasal steroids, or oral steroids (budesonide, ciclesonide, dexamethasone, flunisolide, fluticasone, methylprednisolone, triamcinolone, prednisone (or brand names Alvesco, Deltasone, Flovent, Medrol, Nasacort, Rhinocort, or Veramyst)    Metoclopramide (Reglan)    Ondansetron (Zuplenz, Zofran ODT, Zofran)    Prochlorperazine (Compazine)    I'm not sure   Have you had an allergic or bad reaction to any of these eye drops or nasal sprays? Scroll to see all options. Select all that apply.    None of the above   Not selected:    Azelastine (Astelin, Astepro, Optivar)    Cromolyn (Crolom, NasalCrom)    Ipratropium (Atrovent)    Ketotifen (Alaway, Zaditor)    Pheniramine/naphazoline (Naphcon-A, Opcon-A, " Visine-A)    Olopatadine (Pataday, Patanol, Pazeo)    I'm not sure   Have you had an allergic or bad reaction to any of these non-prescription medications? Scroll to see all options. Select all that apply.    None of the above   Not selected:    Acetaminophen (Tylenol)    Aspirin    Cetirizine (Zyrtec)    Chlorpheniramine (Aller-chlor, Chlor-Trimeton)    Dextromethorphan (Delsym, Robitussin, Vicks DayQuil Cough)    Diphenhydramine (Benadryl)    Fexofenadine (Allegra)    Guaifenesin (Mucinex)    Guaifenesin/dextromethorphan (Delsym DM, Mucinex DM, Robitussin DM)    Ibuprofen (Advil, Motrin, Midol)    Loratadine (Alavert, Claritin)    Oxymetazoline (Afrin)    Phenylephrine (Sudafed)    I'm not sure   Are you allergic to milk or to the proteins found in milk (for example, whey or casein)? A milk allergy is different from lactose intolerance. Select one.    No   Not selected:    Yes    I'm not sure   Have you ever had jaundice or liver problems as a result of taking amoxicillin-clavulanate (Augmentin)? Jaundice is a condition in which the skin and the whites of the eyes turn yellow. Select all that apply.    No, not that I know of   Not selected:    Yes, jaundice    Yes, liver problems   Have you ever had jaundice or liver problems as a result of taking azithromycin (Zithromax, Zmax)? Jaundice is a condition in which the skin and the whites of the eyes turn yellow. Select all that apply.    No, not that I know of   Not selected:    Yes, jaundice    Yes, liver problems   Do you need a doctor's note? A doctor's note confirms that you received care today and states when you can return to school or work. It does not contain information about your diagnosis or treatment plan. Your provider will make the final decision on whether to give you a doctor's note and for how long. Doctor's notes CANNOT be backdated. We can't provide medical leave paperwork through this type of visit. If more paperwork is needed to request time off,  contact your primary care provider. Select one.    No   Not selected:    Today only (1 day)    Today and tomorrow (2 days)    3 days    5 days    7 days    10 days    14 days   Is there anything else you'd like to tell us about your symptoms?    Lost taste and smell within the last 48 hours, no fever but incredibly tired and having slight breathing difficulties that I thought was originally maybe because of the heat.   ----------   Medical history   Medical history data does not currently exist for this patient.

## 2022-08-24 ENCOUNTER — OFFICE VISIT (OUTPATIENT)
Dept: FAMILY MEDICINE CLINIC | Facility: CLINIC | Age: 32
End: 2022-08-24

## 2022-08-24 ENCOUNTER — LAB (OUTPATIENT)
Dept: FAMILY MEDICINE CLINIC | Facility: CLINIC | Age: 32
End: 2022-08-24

## 2022-08-24 VITALS
DIASTOLIC BLOOD PRESSURE: 76 MMHG | TEMPERATURE: 97.7 F | HEIGHT: 64 IN | OXYGEN SATURATION: 98 % | WEIGHT: 271 LBS | HEART RATE: 93 BPM | SYSTOLIC BLOOD PRESSURE: 111 MMHG | BODY MASS INDEX: 46.26 KG/M2

## 2022-08-24 DIAGNOSIS — R61 SWEATING INCREASE: ICD-10-CM

## 2022-08-24 DIAGNOSIS — J45.40 MODERATE PERSISTENT ASTHMA WITHOUT COMPLICATION: ICD-10-CM

## 2022-08-24 DIAGNOSIS — R53.83 FATIGUE, UNSPECIFIED TYPE: Primary | ICD-10-CM

## 2022-08-24 DIAGNOSIS — H69.83 EUSTACHIAN TUBE DYSFUNCTION, BILATERAL: ICD-10-CM

## 2022-08-24 DIAGNOSIS — L68.0 HIRSUTISM: ICD-10-CM

## 2022-08-24 DIAGNOSIS — R53.83 FATIGUE, UNSPECIFIED TYPE: ICD-10-CM

## 2022-08-24 DIAGNOSIS — E28.2 PCOS (POLYCYSTIC OVARIAN SYNDROME): ICD-10-CM

## 2022-08-24 DIAGNOSIS — H66.93 BILATERAL OTITIS MEDIA, UNSPECIFIED OTITIS MEDIA TYPE: ICD-10-CM

## 2022-08-24 PROBLEM — B96.89 BACTERIAL SINUSITIS: Status: ACTIVE | Noted: 2022-08-24

## 2022-08-24 PROBLEM — J32.9 BACTERIAL SINUSITIS: Status: ACTIVE | Noted: 2022-08-24

## 2022-08-24 LAB
ALBUMIN SERPL-MCNC: 3.8 G/DL (ref 3.5–5.2)
ALBUMIN/GLOB SERPL: 1.1 G/DL
ALP SERPL-CCNC: 87 U/L (ref 39–117)
ALT SERPL W P-5'-P-CCNC: 6 U/L (ref 1–33)
ANION GAP SERPL CALCULATED.3IONS-SCNC: 9.8 MMOL/L (ref 5–15)
AST SERPL-CCNC: 14 U/L (ref 1–32)
BASOPHILS # BLD AUTO: 0.09 10*3/MM3 (ref 0–0.2)
BASOPHILS NFR BLD AUTO: 0.6 % (ref 0–1.5)
BILIRUB SERPL-MCNC: <0.2 MG/DL (ref 0–1.2)
BUN SERPL-MCNC: 10 MG/DL (ref 6–20)
BUN/CREAT SERPL: 9.2 (ref 7–25)
CALCIUM SPEC-SCNC: 8.5 MG/DL (ref 8.6–10.5)
CHLORIDE SERPL-SCNC: 105 MMOL/L (ref 98–107)
CO2 SERPL-SCNC: 24.2 MMOL/L (ref 22–29)
CREAT SERPL-MCNC: 1.09 MG/DL (ref 0.57–1)
DEPRECATED RDW RBC AUTO: 39.4 FL (ref 37–54)
EGFRCR SERPLBLD CKD-EPI 2021: 68.9 ML/MIN/1.73
EOSINOPHIL # BLD AUTO: 1.05 10*3/MM3 (ref 0–0.4)
EOSINOPHIL NFR BLD AUTO: 7.3 % (ref 0.3–6.2)
ERYTHROCYTE [DISTWIDTH] IN BLOOD BY AUTOMATED COUNT: 12.7 % (ref 12.3–15.4)
GLOBULIN UR ELPH-MCNC: 3.4 GM/DL
GLUCOSE SERPL-MCNC: 93 MG/DL (ref 65–99)
HCT VFR BLD AUTO: 38.9 % (ref 34–46.6)
HETEROPH AB SER QL LA: NEGATIVE
HGB BLD-MCNC: 12.9 G/DL (ref 12–15.9)
IMM GRANULOCYTES # BLD AUTO: 0.06 10*3/MM3 (ref 0–0.05)
IMM GRANULOCYTES NFR BLD AUTO: 0.4 % (ref 0–0.5)
LYMPHOCYTES # BLD AUTO: 4.02 10*3/MM3 (ref 0.7–3.1)
LYMPHOCYTES NFR BLD AUTO: 28 % (ref 19.6–45.3)
MCH RBC QN AUTO: 29.1 PG (ref 26.6–33)
MCHC RBC AUTO-ENTMCNC: 33.2 G/DL (ref 31.5–35.7)
MCV RBC AUTO: 87.6 FL (ref 79–97)
MONOCYTES # BLD AUTO: 1.09 10*3/MM3 (ref 0.1–0.9)
MONOCYTES NFR BLD AUTO: 7.6 % (ref 5–12)
NEUTROPHILS NFR BLD AUTO: 56.1 % (ref 42.7–76)
NEUTROPHILS NFR BLD AUTO: 8.06 10*3/MM3 (ref 1.7–7)
NRBC BLD AUTO-RTO: 0 /100 WBC (ref 0–0.2)
PLATELET # BLD AUTO: 285 10*3/MM3 (ref 140–450)
PMV BLD AUTO: 11.3 FL (ref 6–12)
POTASSIUM SERPL-SCNC: 3.9 MMOL/L (ref 3.5–5.2)
PROT SERPL-MCNC: 7.2 G/DL (ref 6–8.5)
RBC # BLD AUTO: 4.44 10*6/MM3 (ref 3.77–5.28)
SODIUM SERPL-SCNC: 139 MMOL/L (ref 136–145)
TSH SERPL DL<=0.05 MIU/L-ACNC: 3.66 UIU/ML (ref 0.27–4.2)
WBC NRBC COR # BLD: 14.37 10*3/MM3 (ref 3.4–10.8)

## 2022-08-24 PROCEDURE — 36415 COLL VENOUS BLD VENIPUNCTURE: CPT | Performed by: FAMILY MEDICINE

## 2022-08-24 PROCEDURE — 84443 ASSAY THYROID STIM HORMONE: CPT | Performed by: FAMILY MEDICINE

## 2022-08-24 PROCEDURE — 80053 COMPREHEN METABOLIC PANEL: CPT | Performed by: FAMILY MEDICINE

## 2022-08-24 PROCEDURE — 85025 COMPLETE CBC W/AUTO DIFF WBC: CPT | Performed by: FAMILY MEDICINE

## 2022-08-24 PROCEDURE — 86308 HETEROPHILE ANTIBODY SCREEN: CPT | Performed by: FAMILY MEDICINE

## 2022-08-24 PROCEDURE — 99213 OFFICE O/P EST LOW 20 MIN: CPT | Performed by: FAMILY MEDICINE

## 2022-08-24 RX ORDER — DOXYCYCLINE HYCLATE 100 MG/1
100 CAPSULE ORAL 2 TIMES DAILY
Qty: 20 CAPSULE | Refills: 0 | Status: SHIPPED | OUTPATIENT
Start: 2022-08-24 | End: 2022-09-03

## 2022-08-24 RX ORDER — FLUTICASONE PROPIONATE 50 MCG
2 SPRAY, SUSPENSION (ML) NASAL DAILY
Qty: 15.8 ML | Refills: 1 | Status: SHIPPED | OUTPATIENT
Start: 2022-08-24

## 2022-08-24 RX ORDER — SPIRONOLACTONE 50 MG/1
50 TABLET, FILM COATED ORAL DAILY
Qty: 90 TABLET | Refills: 1 | Status: SHIPPED | OUTPATIENT
Start: 2022-08-24 | End: 2023-03-20 | Stop reason: SDUPTHER

## 2022-08-24 NOTE — PROGRESS NOTES
"Mckenna Steen is a 32 y.o. female.     History of Present Illness   The patient presents today with complaints of dizziness for the last few days, as well as shortness of breath and fatigue.      She has been experiencing fatigue, shortness of breath, chest tightness, cough, and wheezing. She has been compliant with use of her inhaler and breathing treatments, and her last breathing treatment was this morning prior to her appointment. The patient continues to take Xyzal. Denies any fever. Denies any difficulty sleeping. During her asthma attacks she experiences shortness of breath and chest tightness.     She complains of random small bruises. A bruise appeared on her lateral breast this morning, which does not look like a \"normal\" bruise to the patient. Intermittently she experiences the feeling of passing out when she is severely tired; however, she passed out on the night of 08/23/2022, and woke up on the floor. Denies hitting her head, but states her lateral arm felt \"kind of weird\".     Her menstrual cycle bleeding has been heavier than normal. In 07/2022 she had more than one menstrual cycle. She has PCOS, which she takes spironolactone for.    She has been experiencing excessive sweating for the past couple of days. Denies any recent falls, besides her episode of passing out. She woke up at 6:00 AM sweating excessively.    She takes Vraylar, spironolactone, and Xyzal, and denies taking other medications. She also uses nasal spray and her inhalers.    The following portions of the patient's history were reviewed and updated as appropriate: allergies, current medications, past family history, past medical history, past social history, past surgical history, and problem list.  Past Medical History:   Diagnosis Date   • Anxiety    • Asthma    • Exposure to COVID-19 virus 01/02/2022   • Migraine    • MRSA (methicillin resistant Staphylococcus aureus)    • PCOS (polycystic ovarian syndrome)    • PE " (pulmonary thromboembolism) ([facility]) 2019   • Staph infection    • Tachycardia      Past Surgical History:   Procedure Laterality Date   • ADENOIDECTOMY     • TONSILLECTOMY       Family History   Problem Relation Age of Onset   • Heart disease Mother    • Migraines Mother    • Heart disease Father    • Breast cancer Maternal Aunt    • Diabetes Paternal Grandmother    • Hypertension Paternal Grandmother      Social History     Socioeconomic History   • Marital status:    Tobacco Use   • Smoking status: Never Smoker   • Smokeless tobacco: Never Used   Vaping Use   • Vaping Use: Never used   Substance and Sexual Activity   • Alcohol use: Yes     Comment: very rare   • Drug use: Yes     Types: Marijuana   • Sexual activity: Yes     Partners: Male         Current Outpatient Medications:   •  albuterol (ACCUNEB) 1.25 MG/3ML nebulizer solution, Take 3 mL by nebulization Every 6 (Six) Hours As Needed for Wheezing for up to 25 doses., Disp: 25 each, Rfl: 0  •  albuterol sulfate  (90 Base) MCG/ACT inhaler, Inhale 2 puffs Every 6 (Six) Hours As Needed for Wheezing or Shortness of Air., Disp: 18 g, Rfl: 5  •  budesonide-formoterol (SYMBICORT) 160-4.5 MCG/ACT inhaler, Inhale 2 puffs 2 (Two) Times a Day. Rinse mouth after each use., Disp: 10.2 each, Rfl: 5  •  Cariprazine HCl (VRAYLAR) 1.5 MG capsule capsule, Take 1 capsule by mouth Daily., Disp: , Rfl:   •  fluticasone (Flonase) 50 MCG/ACT nasal spray, 2 sprays into the nostril(s) as directed by provider Daily., Disp: 15.8 mL, Rfl: 1  •  levocetirizine (XYZAL) 5 MG tablet, Take 5 mg by mouth Every Evening., Disp: , Rfl:   •  spironolactone (Aldactone) 50 MG tablet, Take 1 tablet by mouth Daily., Disp: 90 tablet, Rfl: 1  •  doxycycline (VIBRAMYCIN) 100 MG capsule, Take 1 capsule by mouth 2 (Two) Times a Day for 10 days., Disp: 20 capsule, Rfl: 0    Review of Systems   A review of systems was completed and positive findings are noted in the HPI.     /76 (BP  "Location: Left arm, Patient Position: Sitting, Cuff Size: Large Adult)   Pulse 93   Temp 97.7 °F (36.5 °C) (Temporal)   Ht 162.6 cm (64\")   Wt 123 kg (271 lb)   SpO2 98%   Breastfeeding No   BMI 46.52 kg/m²       Objective   Physical Exam  Vitals reviewed.   Constitutional:       General: She is not in acute distress.     Appearance: Normal appearance. She is well-developed. She is morbidly obese.      Comments: She is afebrile.   HENT:      Head: Normocephalic and atraumatic.      Right Ear: Tympanic membrane is erythematous and bulging.      Left Ear: Tympanic membrane is erythematous and bulging.      Nose: Nose normal.      Right Sinus: No maxillary sinus tenderness or frontal sinus tenderness.      Left Sinus: No maxillary sinus tenderness or frontal sinus tenderness.   Eyes:      General: Lids are normal.      Conjunctiva/sclera: Conjunctivae normal.      Pupils: Pupils are equal, round, and reactive to light.   Neck:      Thyroid: No thyromegaly.      Trachea: Trachea normal.   Cardiovascular:      Rate and Rhythm: Normal rate and regular rhythm.      Heart sounds: Normal heart sounds.   Pulmonary:      Effort: Pulmonary effort is normal. No respiratory distress.      Breath sounds: Normal breath sounds.      Comments: She is not using any accessory muscles of respiration.  Her oxygen saturation on room air is 98%.  Musculoskeletal:      Right lower leg: No edema.      Left lower leg: No edema.   Skin:     General: Skin is warm and dry.      Coloration: Skin is not cyanotic.      Comments: No abnormal bruising noted.   Neurological:      Mental Status: She is alert.      GCS: GCS eye subscore is 4. GCS verbal subscore is 5. GCS motor subscore is 6.   Psychiatric:         Attention and Perception: Attention normal.         Mood and Affect: Mood normal.         Speech: Speech normal.         Behavior: Behavior is cooperative.           Assessment & Plan   Problems Addressed this Visit        Endocrine " and Metabolic    PCOS (polycystic ovarian syndrome)    Relevant Medications    spironolactone (Aldactone) 50 MG tablet       Pulmonary and Pneumonias    Moderate persistent asthma without complication    Relevant Orders    Ambulatory Referral to Pulmonology      Other Visit Diagnoses     Fatigue, unspecified type    -  Primary    Relevant Orders    CBC & Differential    Comprehensive Metabolic Panel    TSH    Mononucleosis Screen    Sweating increase        Relevant Orders    CBC & Differential    Comprehensive Metabolic Panel    TSH    Bilateral otitis media, unspecified otitis media type        Eustachian tube dysfunction, bilateral        Hirsutism        Relevant Medications    spironolactone (Aldactone) 50 MG tablet    Upper respiratory tract infection, unspecified type        Given Levaquin.    Given Flonase.    Cont. Xyzal.   Increase fluids and rest.   Call for worsening.     Relevant Medications    doxycycline (VIBRAMYCIN) 100 MG capsule    fluticasone (Flonase) 50 MCG/ACT nasal spray      Diagnoses       Codes Comments    Fatigue, unspecified type    -  Primary ICD-10-CM: R53.83  ICD-9-CM: 780.79     Sweating increase     ICD-10-CM: R61  ICD-9-CM: 780.8     Bilateral otitis media, unspecified otitis media type     ICD-10-CM: H66.93  ICD-9-CM: 382.9     Eustachian tube dysfunction, bilateral     ICD-10-CM: H69.83  ICD-9-CM: 381.81     PCOS (polycystic ovarian syndrome)     ICD-10-CM: E28.2  ICD-9-CM: 256.4     Hirsutism     ICD-10-CM: L68.0  ICD-9-CM: 704.1     Upper respiratory tract infection, unspecified type     ICD-10-CM: J06.9  ICD-9-CM: 465.9      Moderate persistent asthma without complication     ICD-10-CM: J45.40  ICD-9-CM: 493.90         1. Fatigue and sweating  • CBC, CMP, and TSH, lab work along with a mono test have been ordered.    2. Bilateral otitis media  • Start doxycycline as directed.    3. Eustachian tube dysfunction  • Flonase refills have been sent to her pharmacy.     4. PCOS and  hirsutism  • Spironolactone refills have been sent to her pharmacy.     5. Asthma  • A referral to a pulmonologist has been sent as she has not seen one in quite some time. She was encouraged to push fluids and rest.            Transcribed from ambient dictation for Christy Hernández MD by HARINI FIGUEROA.  08/24/22   16:29 EDT    Patient verbalized consent to the visit recording.

## 2022-08-25 ENCOUNTER — TELEPHONE (OUTPATIENT)
Dept: FAMILY MEDICINE CLINIC | Facility: CLINIC | Age: 32
End: 2022-08-25

## 2022-08-25 DIAGNOSIS — D72.829 LEUKOCYTOSIS, UNSPECIFIED TYPE: Primary | ICD-10-CM

## 2022-08-25 NOTE — TELEPHONE ENCOUNTER
----- Message from Gracie Fink MA sent at 8/25/2022 10:54 AM EDT -----  Patient notified. She has had elevated white blood cell count in the past. A previous doctor has mentioned she may need checked for Von Willebrand disease. Is this something you can check her for? She has had pulmonary embolism in the past and has also had some bleeding episodes where they had to give her meds to stop bleeding.

## 2022-09-06 ENCOUNTER — OFFICE VISIT (OUTPATIENT)
Dept: FAMILY MEDICINE CLINIC | Facility: CLINIC | Age: 32
End: 2022-09-06

## 2022-09-06 VITALS
SYSTOLIC BLOOD PRESSURE: 118 MMHG | HEART RATE: 98 BPM | OXYGEN SATURATION: 95 % | DIASTOLIC BLOOD PRESSURE: 81 MMHG | BODY MASS INDEX: 46.61 KG/M2 | HEIGHT: 64 IN | WEIGHT: 273 LBS | TEMPERATURE: 98.4 F

## 2022-09-06 DIAGNOSIS — R45.86 EMOTIONAL LABILITY: Primary | ICD-10-CM

## 2022-09-06 PROCEDURE — 99213 OFFICE O/P EST LOW 20 MIN: CPT | Performed by: FAMILY MEDICINE

## 2022-09-06 RX ORDER — LAMOTRIGINE 25 MG/1
1 TABLET ORAL DAILY
COMMUNITY
Start: 2022-08-25

## 2022-09-06 NOTE — PROGRESS NOTES
Subjective   Karma Steen is a 32 y.o. female.     History of Present Illness     Patient verbalized consent to the visit recording.    The patient was started on Lamictal on 08/25/2022 by her psychiatrist, JOE Hood. She has been crying since yesterday, noting the crying is severe today and does not want to stop. Denies suicidal or homicidal ideations, but has felt overwhelmed. Denies any changes in her medications. She has a follow-up appointment with her psychiatrist tomorrow.      The following portions of the patient's history were reviewed and updated as appropriate: allergies, current medications, past family history, past medical history, past social history, past surgical history, and problem list.  Past Medical History:   Diagnosis Date   • Anxiety    • Asthma    • Exposure to COVID-19 virus 01/02/2022   • Migraine    • MRSA (methicillin resistant Staphylococcus aureus)    • PCOS (polycystic ovarian syndrome)    • PE (pulmonary throm[facility]embolism) ([facility]) 2019   • Staph infection    • Tachycardia      Past Surgical History:   Procedure Laterality Date   • ADENOIDECTOMY     • TONSILLECTOMY       Family History   Problem Relation Age of Onset   • Heart disease Mother    • Migraines Mother    • Heart disease Father    • Breast cancer Maternal Aunt    • Diabetes Paternal Grandmother    • Hypertension Paternal Grandmother      Social History     Socioeconomic History   • Marital status:    Tobacco Use   • Smoking status: Never Smoker   • Smokeless tobacco: Never Used   Vaping Use   • Vaping Use: Never used   Substance and Sexual Activity   • Alcohol use: Yes     Comment: very rare   • Drug use: Yes     Types: Marijuana   • Sexual activity: Yes     Partners: Male         Current Outpatient Medications:   •  albuterol (ACCUNEB) 1.25 MG/3ML nebulizer solution, Take 3 mL by nebulization Every 6 (Six) Hours As Needed for Wheezing for up to 25 doses., Disp: 25 each, Rfl: 0  •  albuterol sulfate  (90  "Base) MCG/ACT inhaler, Inhale 2 puffs Every 6 (Six) Hours As Needed for Wheezing or Shortness of Air., Disp: 18 g, Rfl: 5  •  budesonide-formoterol (SYMBICORT) 160-4.5 MCG/ACT inhaler, Inhale 2 puffs 2 (Two) Times a Day. Rinse mouth after each use., Disp: 10.2 each, Rfl: 5  •  Cariprazine HCl (VRAYLAR) 1.5 MG capsule capsule, Take 1 capsule by mouth Daily., Disp: , Rfl:   •  fluticasone (Flonase) 50 MCG/ACT nasal spray, 2 sprays into the nostril(s) as directed by provider Daily., Disp: 15.8 mL, Rfl: 1  •  lamoTRIgine (LaMICtal) 25 MG tablet, Take 1 tablet by mouth Daily., Disp: , Rfl:   •  levocetirizine (XYZAL) 5 MG tablet, Take 5 mg by mouth Every Evening., Disp: , Rfl:   •  spironolactone (Aldactone) 50 MG tablet, Take 1 tablet by mouth Daily., Disp: 90 tablet, Rfl: 1    Review of Systems   ROS done and noted in HPI  /81 (BP Location: Left arm, Patient Position: Sitting, Cuff Size: Large Adult)   Pulse 98   Temp 98.4 °F (36.9 °C) (Temporal)   Ht 162.6 cm (64\")   Wt 124 kg (273 lb)   SpO2 95%   Breastfeeding No   BMI 46.86 kg/m²       Objective   Physical Exam  Constitutional:       General: She is not in acute distress.     Appearance: She is well-developed. She is morbidly obese.      Comments: Well-nourished.She is slightly tearful.   Cardiovascular:      Rate and Rhythm: Normal rate and regular rhythm.      Heart sounds: No murmur heard.  Pulmonary:      Comments: Lungs are clear to auscultation bilaterally.  Musculoskeletal:      Cervical back: Neck supple.   Lymphadenopathy:      Cervical: No cervical adenopathy.   Psychiatric:         Mood and Affect: Affect is flat and tearful.           Assessment & Plan   Problems Addressed this Visit    None     Visit Diagnoses     Emotional lability    -  Primary      Diagnoses       Codes Comments    Emotional lability    -  Primary ICD-10-CM: R45.86  ICD-9-CM: 799.24           1.Emotional lability.  When she spoke with her psychiatrist, they told her " they wanted her to stay on the medication. This is a common side effect for Lamictal. She has an appointment for follow-up with her therapist tomorrow. She is not actively suicidal or homicidal. She will continue the medication and keep her follow-up appointment. Gave her a return to work statement for tomorrow.    Transcribed from ambient dictation for Christy Hernández MD by Sol Horner.  09/06/22   17:18 EDT    Patient verbalized consent to the visit recording.

## 2022-09-07 ENCOUNTER — TELEPHONE (OUTPATIENT)
Dept: ONCOLOGY | Facility: CLINIC | Age: 32
End: 2022-09-07

## 2022-09-07 NOTE — TELEPHONE ENCOUNTER
Caller: JAIMEE    Relationship to patient: SELF    Best call back number: [phone]    Patient is needing: TO R/S 9-7-2022 LAB AND APPT TODAY. SHE IS HAVING CAR ISSUES. HUB TRIED TO WT 2X AND WAS UNSUCCESSFUL.

## 2022-09-08 ENCOUNTER — PATIENT ROUNDING (BHMG ONLY) (OUTPATIENT)
Dept: ONCOLOGY | Facility: CLINIC | Age: 32
End: 2022-09-08

## 2022-09-08 NOTE — PROGRESS NOTES
September 8, 2022    Hello, may I speak with Karma Steen?    My name is Concepcion Xiao      I am  with [facility] [address] [place] [address] [phone].    Before we get started may I verify your date of birth? 1990    I am calling to officially welcome you to our practice and ask about your recent visit. Is this a good time to talk? no    Tell me about your visit with us. What things went well?  A My Chart message was sent to the patient.       We're always looking for ways to make our patients' experiences even better. Do you have recommendations on ways we may improve?  no    Overall were you satisfied with your first visit to our practice? yes       I appreciate you taking the time to speak with me today. Is there anything else I can do for you? no      Thank you, and have a great day.

## 2022-09-28 ENCOUNTER — TELEPHONE (OUTPATIENT)
Dept: FAMILY MEDICINE CLINIC | Facility: CLINIC | Age: 32
End: 2022-09-28

## 2022-09-28 NOTE — TELEPHONE ENCOUNTER
Pt side effects: she is having inccreased anxiety, crying spells, and chest pain.   She is wanting to know if anything can be prescribed for her for anxiety that will not make her sleepy. She tried hydroxyzine 10mg tabs in the past and was unable to take them due to it making her too drowsy. She says that it was prescribed by pcp in the past not her psych. Or if she can be seen in office today to discuss.   Also, FMLA paperwork received I asked patient and psych did not put her on medical leave. She says that she has not taken any days yet other than the day of her last apt, but is wanting it for future use as she is having issues with her anxiety and her medications. She reports speaking to someone in the office at her last visit about the paperwork and was told to bring it in for it to be reviewed.

## 2022-09-28 NOTE — TELEPHONE ENCOUNTER
Caller: Karma Steen    Relationship to patient: Self    Best call back number: [phone]    Patient is needing: PATIENT STATES SHE IS HAVING MEDICATION SIDE EFFECTS. PATIENT STATES SHE DIDN'T KNOW THAT DR BRASHER WAS LEAVING. PATIENT ALSO HAS [facility] PAPERWORK TO BE FILLED OUT.   PLEASE ADVISE

## 2022-09-28 NOTE — TELEPHONE ENCOUNTER
Spoke to Karma and asked her the questions. She sees a Counselor and a Physiatric NP at [facility]. She is going to contact them about the medicine questions.

## 2023-02-27 ENCOUNTER — TELEPHONE (OUTPATIENT)
Dept: FAMILY MEDICINE CLINIC | Facility: CLINIC | Age: 33
End: 2023-02-27

## 2023-02-27 NOTE — TELEPHONE ENCOUNTER
Caller: Karma Steen    Relationship to patient: Self    Best call back number: [phone]    Chief complaint: MEDICATION REFILL     Type of visit: OFFICE VISIT     Requested date:ANYTIME EARLY

## 2023-02-27 NOTE — TELEPHONE ENCOUNTER
Called patient but no answer so left voice mail to see if she can come in on mon march 20,2023 at 10:30am. Told patient to call to confirm if she can do that appointment

## 2023-03-20 ENCOUNTER — OFFICE VISIT (OUTPATIENT)
Dept: FAMILY MEDICINE CLINIC | Facility: CLINIC | Age: 33
End: 2023-03-20
Payer: COMMERCIAL

## 2023-03-20 ENCOUNTER — LAB (OUTPATIENT)
Dept: FAMILY MEDICINE CLINIC | Facility: CLINIC | Age: 33
End: 2023-03-20
Payer: COMMERCIAL

## 2023-03-20 VITALS
WEIGHT: 225 LBS | BODY MASS INDEX: 38.41 KG/M2 | OXYGEN SATURATION: 97 % | SYSTOLIC BLOOD PRESSURE: 125 MMHG | DIASTOLIC BLOOD PRESSURE: 80 MMHG | HEART RATE: 100 BPM | HEIGHT: 64 IN

## 2023-03-20 DIAGNOSIS — E28.2 PCOS (POLYCYSTIC OVARIAN SYNDROME): ICD-10-CM

## 2023-03-20 DIAGNOSIS — Z00.00 HEALTHCARE MAINTENANCE: ICD-10-CM

## 2023-03-20 DIAGNOSIS — Z86.711 HX PULMONARY EMBOLISM: ICD-10-CM

## 2023-03-20 DIAGNOSIS — R00.0 TACHYCARDIA: ICD-10-CM

## 2023-03-20 DIAGNOSIS — J45.40 MODERATE PERSISTENT ASTHMA WITHOUT COMPLICATION: Primary | ICD-10-CM

## 2023-03-20 DIAGNOSIS — L68.0 HIRSUTISM: ICD-10-CM

## 2023-03-20 DIAGNOSIS — R06.00 DYSPNEA, UNSPECIFIED TYPE: ICD-10-CM

## 2023-03-20 PROCEDURE — 99214 OFFICE O/P EST MOD 30 MIN: CPT | Performed by: NURSE PRACTITIONER

## 2023-03-20 RX ORDER — HYDROXYZINE PAMOATE 25 MG/1
CAPSULE ORAL
COMMUNITY
Start: 2023-01-04

## 2023-03-20 RX ORDER — BUDESONIDE AND FORMOTEROL FUMARATE DIHYDRATE 160; 4.5 UG/1; UG/1
2 AEROSOL RESPIRATORY (INHALATION)
Qty: 10.2 EACH | Refills: 5 | Status: SHIPPED | OUTPATIENT
Start: 2023-03-20

## 2023-03-20 RX ORDER — ALBUTEROL SULFATE 90 UG/1
2 AEROSOL, METERED RESPIRATORY (INHALATION) EVERY 6 HOURS PRN
Qty: 18 G | Refills: 5 | Status: SHIPPED | OUTPATIENT
Start: 2023-03-20

## 2023-03-20 RX ORDER — SPIRONOLACTONE 50 MG/1
50 TABLET, FILM COATED ORAL DAILY
Qty: 90 TABLET | Refills: 1 | Status: SHIPPED | OUTPATIENT
Start: 2023-03-20

## 2023-03-20 NOTE — PROGRESS NOTES
Subjective   Karma Steen is a 33 y.o. female.       HPI   Pt is here today for follow up.   1) Mod persistent asthma - currently on Symbicort 160-4.5 inhaler; albuterol PRN.  Asthma has been well controlled; no coughs or wheezes.      2) PCOS - currently on spironolactone 50 mg daily. Symptoms stable.   3) Having episodes of intermittent SOA and heart racing; episodes last 5 min; started a week ago. Occurring at random day or night; at rest and when active.  Has hx of PE several years ago; was on blood thinner for a time then it was stopped.  Denies any chest pain.      The following portions of the patient's history were reviewed and updated as appropriate: allergies, current medications, past family history, past medical history, past social history, past surgical history and problem list.    Review of Systems   Constitutional: Negative for chills, fatigue and fever.   Eyes: Negative for visual disturbance.   Respiratory: Positive for shortness of breath. Negative for cough, chest tightness and wheezing.    Cardiovascular: Positive for palpitations (racing). Negative for chest pain and leg swelling.   Gastrointestinal: Negative for abdominal pain, blood in stool, constipation, diarrhea, nausea, vomiting and GERD.   Endocrine: Negative for polydipsia, polyphagia and polyuria.   Genitourinary: Negative for dysuria, frequency, hematuria and urgency.   Neurological: Negative for dizziness, syncope, weakness, light-headedness and headache.   Psychiatric/Behavioral: Negative for depressed mood. The patient is not nervous/anxious.        Objective   Physical Exam  Vitals reviewed.   Constitutional:       General: She is not in acute distress.     Appearance: Normal appearance. She is obese.   Cardiovascular:      Rate and Rhythm: Normal rate and regular rhythm.      Pulses: Normal pulses.      Heart sounds: Normal heart sounds. No murmur heard.  Pulmonary:      Effort: Pulmonary effort is normal. No respiratory  distress.      Breath sounds: Normal breath sounds. No wheezing, rhonchi or rales.   Chest:      Chest wall: No tenderness.   Abdominal:      Tenderness: There is no right CVA tenderness or left CVA tenderness.   Musculoskeletal:      Cervical back: Normal range of motion and neck supple.      Right lower leg: No edema.      Left lower leg: No edema.   Skin:     General: Skin is warm and dry.      Findings: No erythema.   Neurological:      General: No focal deficit present.      Mental Status: She is alert and oriented to person, place, and time.   Psychiatric:         Mood and Affect: Mood normal.           Assessment & Plan   Diagnoses and all orders for this visit:    1. Moderate persistent asthma without complication (Primary)  Comments:  Stable.   Cont. current medication.   Labs today.   RTO in 6 mo.   Orders:  -     albuterol sulfate  (90 Base) MCG/ACT inhaler; Inhale 2 puffs Every 6 (Six) Hours As Needed for Wheezing or Shortness of Air.  Dispense: 18 g; Refill: 5  -     budesonide-formoterol (SYMBICORT) 160-4.5 MCG/ACT inhaler; Inhale 2 puffs 2 (Two) Times a Day. Rinse mouth after each use.  Dispense: 10.2 each; Refill: 5  -     CT Chest Pulmonary Embolism With Contrast; Future    2. PCOS (polycystic ovarian syndrome)  Comments:  Cont. current medication.   Labs today.   RTO in 6 mo.   Orders:  -     spironolactone (Aldactone) 50 MG tablet; Take 1 tablet by mouth Daily.  Dispense: 90 tablet; Refill: 1  -     TSH; Future    3. Hirsutism  Comments:  Cont. current medication.   Labs today.   RTO in 6 mo.   Orders:  -     spironolactone (Aldactone) 50 MG tablet; Take 1 tablet by mouth Daily.  Dispense: 90 tablet; Refill: 1    4. Dyspnea, unspecified type  Comments:  Labs today including D-dimer.   CT chest PE protocol ordered.   Reviewed warning S/S and when to go to ER.   Orders:  -     CT Chest Pulmonary Embolism With Contrast; Future  -     CBC w AUTO Differential; Future  -     D-dimer,  Quantitative; Future  -     Comprehensive metabolic panel; Future    5. Tachycardia  Comments:  Labs today including D-dimer.   CT chest PE protocol ordered.   Reviewed warning S/S and when to go to ER.   Orders:  -     CT Chest Pulmonary Embolism With Contrast; Future  -     CBC w AUTO Differential; Future  -     D-dimer, Quantitative; Future  -     Comprehensive metabolic panel; Future  -     TSH; Future    6. Hx pulmonary embolism  Comments:  Labs today including D-dimer.   CT chest PE protocol ordered.   Reviewed warning S/S and when to go to ER.   Orders:  -     CT Chest Pulmonary Embolism With Contrast; Future  -     CBC w AUTO Differential; Future  -     D-dimer, Quantitative; Future  -     Comprehensive metabolic panel; Future    7. Healthcare maintenance  Comments:  Labs today.   Referral to GYN for routine exam.   Orders:  -     Ambulatory Referral to Gynecology  -     Lipid panel; Future              High BMI Plan  General weight loss/lifestyle modification strategies discussed (elicit support from others; identify saboteurs; non-food rewards, etc).  Informal exercise measures discussed, e.g. taking stairs instead of elevator.

## 2023-04-15 ENCOUNTER — HOSPITAL ENCOUNTER (OUTPATIENT)
Dept: CT IMAGING | Facility: HOSPITAL | Age: 33
Discharge: HOME | End: 2023-04-15
Payer: COMMERCIAL

## 2023-04-17 ENCOUNTER — E-VISIT (OUTPATIENT)
Dept: FAMILY MEDICINE CLINIC | Facility: TELEHEALTH | Age: 33
End: 2023-04-17
Payer: COMMERCIAL

## 2023-04-17 NOTE — EXTERNAL PATIENT INSTRUCTIONS
View Doctor's Note     Diagnosis   Canker sores (aphthous ulcers)   My name is JOE Steiner, and I'm a healthcare provider at [facility]. I've reviewed your photos and responses.   Your symptoms suggest you have canker sores, or aphthous ulcers.   I've given you a doctor's note for 1 day.   Medications   Your pharmacy   [facility] [id number] [address] [place] [address] [address] [place] [id number] [phone]     Prescription   Triamcinolone acetonide oral paste (0.1%): Apply 1/4 inch on affected area twice a day for 7 days, after meals or at bedtime. A cotton swab may be used to apply the paste. Do not attempt to spread or rub the paste in. Do not eat or drink for 30 minutes after application.   Non-prescription   Benzocaine topical gel (10%): Apply thin film on affected area 4 times a day as needed for up to 7 days for pain or discomfort. Brands to look for include Anbesol and Zilactin-B.   Acetaminophen (325mg): Take 2 tablets by mouth every 6 hours as needed for up to 10 days for any pain or discomfort associated with your condition. Do not exceed 6 doses in a 24-hour period. Brands to look for include Tylenol.   About your diagnosis   Canker sores are open, shallow sores inside the mouth. The sores are flat with a yellowish or whitish center and a red border. Unlike cold sores, canker sores are not contagious. You can't get them from sharing food or kissing someone.   The exact cause of canker sores isn't known. However, we do know that certain things can trigger canker sores or make them worse:    Increased stress or a recent life change    Dental work    Injury to the inside of your mouth, such as biting your cheek or injury from braces, dentures, or a toothbrush    A new medication    Eating citrus fruits or other acidic foods   Women tend to get canker sores twice as often as men. This may be related to hormones, as women often get them during their menstrual period.   What to expect   Most canker  sores heal on their own within 7 to 14 days.   When to seek care   Call us at [phone]   with any sudden or unexpected symptoms.    Very large sores or sores that spread.    Sores that come back frequently, especially if new sores form before the old ones heal.    Sores lasting longer than 2 weeks.    Sores that become unbearably painful.    Extreme difficulty eating or drinking.    A fever higher than 103F or lasting longer than 4 days.    Your treatment plan isn't working. Your immune condition may mean that you need more care.   Other treatment    Avoid hard, spicy, or highly acidic foods (like citrus fruits); these can cause pain.    Brush your teeth gently to avoid accidentally injuring your canker sores, which can delay healing.    Put an ice cube on your canker sores to help reduce pain and swelling.   Prevention    Manage stress. Many people find regular exercise, meditation, and yoga helpful for managing stress.    Avoid foods that may trigger canker sores, such as spicy foods and acidic fruits like pineapples, grapefruits, and oranges.    If you have food allergies or sensitivities, avoid foods that trigger them.    Practice good oral hygiene. Brush teeth gently and regularly with a soft-bristled toothbrush. Avoid toothpaste and other products that contain sodium lauryl sulfate.   Your provider   Your diagnosis was provided by JOE Steiner, a member of your trusted care team at [facility].   If you have any questions, call us at [phone]  .   View Doctor's Note     Expires on 05/17/23

## 2023-04-17 NOTE — E-VISIT TREATED
Chief Complaint: Mouth sores   Patient introduction   Patient is 33-year-old female who presents with painful ulceration on their inner cheeks or lips.   Number of lesions: 2 to 4.   Symptoms have been present for 4 to 6 days.   Patient requests a 1-day excuse note.   General presentation   No fever.   No pruritus, burning, pain/tenderness, a cottony feeling, or loss of taste. Condition interferes somewhat with talking, eating, or drinking. No difficulty or pain when swallowing liquids and solid food.   No facial edema. No chills, loss of appetite, sore throat, headache, or muscle aches. No swollen lymph nodes.   No recent oral trauma.   Has not started a new course of alendronate, antineoplastics, aspirin, barbiturates, antihypertensive medications, dapsone, gold salts, methyldopa, NSAIDs, penicillin, phenothiazines, phenytoin, sulfonamides, tetracyclines, or tolbutamide in the last 3 weeks.   Recently ate acidic foods prior to symptom onset.   Patient had increased stress or life changes just prior to symptom onset.   Does not wear dentures.   Not taking non-prescription acetaminophen, clotrimazole, ibuprofen, miconazole, topical benzocaine, or zinc oxide for current symptoms.   No history of similar mouth symptoms in the past.   Sexual history: Has had vaginal, anal, or oral sex in past 3 months. Has not had multiple partners in past 3 months. Does not use injection drugs. Sexual partner does not use injection drugs.   Review of red flags/alarm symptoms:    No premalignant mouth lesions    No oral cancer    No unexplained sores or rash-like blisters on other parts of the body    No celiac disease    No neutropenia    No inflammatory bowel disease    No lupus    No Behcet's syndrome    No Anthony's syndrome   Pregnancy/menstrual status/breastfeeding:   Not pregnant. Not breastfeeding. Regarding date of last menstrual period, patient writes: 3/22.   Current medications   Currently taking hydrOXYzine pamoate 25 MG  capsule, levocetirizine 5 MG tablet, albuterol 1.25 MG/3ML nebulizer solution, spironolactone 50 MG tablet, budesonide-formoterol 160-4.5 MCG/ACT inhaler, lamoTRIgine 25 MG tablet, and albuterol sulfate  (90 Base) MCG/ACT inhaler.   Medication allergies   No relevant drug allergies.   Medication contraindication review   No history of aspirin triad, NSAID-induced asthma/urticaria, or CABG surgery.   Past medical history   Immune conditions: Patient has a condition requiring treatment with long-term use of oral steroids. Patient takes medications regularly for their condition(s). No history of cancer.   Social history   Patient does not use tobacco. Does not drink alcohol.   Assessment   Aphthous ulcer.   Plan   Medications:    triamcinolone acetonide 0.1 % dental paste RX 0.1% apply 1/4 inch on affected area bid 7d after meals or at bedtime. A cotton swab may be used to apply the paste. Do not attempt to spread or rub the paste in. Do not eat or drink for 30 minutes after application. Amount is 5 g.    benzocaine 10 % topical gel OTC 10% apply thin film on affected area qid PRN 7d for pain or discomfort. Brands to look for include Anbesol and Zilactin-B. Amount is 11.9 g.    acetaminophen 325 mg tablet OTC 325mg 2 tabs PO q6h PRN 10d for any pain or discomfort associated with your condition. Do not exceed 6 doses in a 24-hour period. Brands to look for include Tylenol. Amount is 80 tab.   The patient's prescription will be sent to:   [facility] [id number]   [address] [place] [address] [address] [place] [place] [address]   Phone: [phone]     Fax: [phone]   Patient informed to purchase OTC medications.   Other:   Patient was given an excuse note for 1 day.   Education:    Condition and causes    Prevention    Treatment and self-care    When to call provider   ----------   Electronically signed by JOE Steiner on 2023-04-17 at 08:09AM   ----------   Patient Interview Transcript:   Are your symptoms on the  inside or the outside of your mouth? - Inside includes your tongue, inner cheek or lips, gums, or roof of mouth - Outside includes your lips, skin surrounding lips, or corners of mouth Select all that apply.    Inside   Not selected:    Outside   Which areas inside your mouth are affected? Select all that apply.    Inner cheeks or inner lips   Not selected:    Tongue    Gums    Roof of my mouth    Floor of my mouth (below the tongue)    Throat   Which of these best describe the symptoms inside your mouth? Select all that apply.    Painful sores (or ulcers)   Not selected:    Painless sores (or ulcers)    Pain    Bumps or lumps    White patches    Red patches    Blisters filled with fluid    Swelling    Cracking   How long have you had these symptoms? Select one.    4 to 6 days   Not selected:    1 to 3 days    7 to 10 days    More than 10 days   Do you have any of these in the affected area of your mouth? Select all that apply.    None of the above   Not selected:    Itching    Burning    Pain or tenderness    A cottony feeling    Loss of taste   How many mouth lesions, sores, or bumps do you have? Select one.    2 to 4   Not selected:    1    5 to 10    More than 10   Mouth sores can interfere with talking, eating, or drinking. How much does the mouth sore affect your ability to do these things? Select one.    Somewhat, but I can still talk, eat, and drink   Not selected:    Not at all    It makes talking, eating, or drinking impossible   Is it difficult or painful for you to swallow liquids and solid food? Select one.    No   Not selected:    Yes   Is there any swelling, redness, or warmth on your cheek or any other part of your face (not including the mouth area)? Select one.    No, not that I can tell   Not selected:    Yes   Ready to send photos? If you choose not to send photos, you may need to speak to a provider to get care. Select one.    OK, I'll send photos   Not selected:    No, I'd rather not send  photos   Send up to three photos for the provider to review: - Make sure the flash is turned on. - Make sure the photos are in focus. - Take at least one close-up photo of the affected area. - Take a photo showing the location in or around your mouth. It's important to take clear photos, so the provider can see your symptoms. Make sure the affected area is visible in your photos before sending, or you may need to visit a provider in person.    Upload 1    Upload 2   Not selected:    Upload 3   Do you have any sores (or rash-like blisters) on other parts of your body that can't be explained by another condition? Select all that apply.    No   Not selected:    Chin    Nose    Cheeks    Forehead    Arms, legs, or upper body    Palms of hands    Soles of feet    Genitals   Do you have any of these symptoms? Select all that apply.    None of the above   Not selected:    Chills    Loss of appetite    Sore throat    Headache    Muscle aches   Have you had a fever along with your mouth symptoms? Select one.    No, not that I know of   Not selected:    Yes   Are your glands/lymph nodes swollen or painful to the touch?    No, not that I can tell   Not selected:    Yes   Have you recently injured your mouth? Examples include injury from braces, dentures, a toothbrush, or accidentally biting your cheek, lip, or tongue. Select one.    No, not that I know of   Not selected:    Yes   Before your symptoms began, did you eat a lot of acidic foods? Examples include cleopatra, oranges, pineapples, figs, tomatoes, or strawberries. Select one.    Yes   Not selected:    No, not that I remember   Just before your symptoms began, did any of these apply to you? Select all that apply.    Increased stress or major life change   Not selected:    Dental work    Extended sun exposure (or sunburn)    Onset of menstrual period    Fever or viral infection (such as cold or flu)    None of the above   Do you wear dentures? Select one.    No   Not  selected:    Yes   Have you had these symptoms before? Select one.    No, not that I remember   Not selected:    Yes   In the last 3 months, have either of these applied to you? Select all that apply.    Neither of the above   Not selected:    Weight loss of 10 pounds or more without trying to lose weight    Severe fatigue or tiredness that doesn't improve with rest   In the last 3 months, have you had any of these symptoms that couldn't be explained by another condition? Select all that apply.    None of the above   Not selected:    Repeated episodes of diarrhea or bloody stools    Eye symptoms (pain, redness, discharge, sensitivity to light, or vision changes)    Urinary symptoms (burning with urination, blood in the urine, or frequent urination)    Joint symptoms (pain, redness, warmth, or swelling)   Have you ever been diagnosed with any of these conditions? Select all that apply.    None of the above   Not selected:    Behcet's syndrome    Celiac disease    Diabetes    Mouth cancer, such as squamous cell cancer or melanoma    Neutropenia    Nutritional deficiency (vitamin B-12, zinc, folic acid, iron deficiency, or malnutrition)    Premalignant (precancerous) mouth lesions (leukoplakia, erythroplakia)    Reactive arthritis (Anthony's syndrome)   Have you had any of these conditions? Select all that apply.    No, not that I know of   Not selected:    Aspirin triad (Samter's triad, or aspirin-sensitive asthma)    History of asthma or hives caused by NSAID drugs (aspirin, ibuprofen, or naproxen)    Coronary artery bypass graft (CABG) surgery   Do you have any of these conditions that can affect the immune system? Scroll to see all options. Select all that apply.    A condition requiring treatment with long-term use of oral steroids (such as prednisone, prednisolone, or dexamethasone)   Not selected:    History of bone marrow transplant    Chronic kidney disease    Chronic liver disease (including cirrhosis)     HIV/AIDS    Inflammatory bowel disease (Crohn's disease or ulcerative colitis)    Lupus    Moderate to severe plaque psoriasis    Multiple sclerosis    Rheumatoid arthritis    Sickle cell anemia    Alpha or beta thalassemia    History of solid organ transplant (kidney, liver, or heart)    History of spleen removal    An autoimmune disorder not listed here    None of these   Do you take medications for your condition? This includes oral and injectable medications that are taken daily, weekly, or monthly. Select one.    Yes, regularly   Not selected:    Yes, for flare-ups only    No   Have you ever been diagnosed with cancer? Select one.    No   Not selected:    Yes, I have cancer now    Yes, but I'm in remission   Are you pregnant? Select one.    No   Not selected:    Yes   When was your last menstrual period? If you don't currently have periods or no longer have periods, please briefly explain.    3/22   Are you breastfeeding? Select one.    No   Not selected:    Yes   Using tobacco can put you at risk for more serious mouth conditions. Do you use any tobacco products? This includes smoking, vaping, snorting, or chewing tobacco. Select one.    No, never   Not selected:    Yes, every day    Yes, some days    No, I quit   Do you drink alcohol? Select one.    No   Not selected:    1 drink or less per week    2 to 5 drinks per week    1 to 2 drinks per day    3 or more drinks per day   In the last 3 months, have you used any injection drugs, such as heroin, cocaine, crystal meth, amphetamines, or opiates? Your response to this question will only be shared with your care provider. Select one.    No   Not selected:    Yes   In the last 3 months, have you had vaginal, anal, or oral sex? Select one.    Yes   Not selected:    No   In the last 3 months, have you had sex with more than one partner? Select one.    No   Not selected:    Yes   In the last 3 months, has your partner used any injection drugs, such as heroin,  cocaine, crystal meth, amphetamines, or opiates? Select one.    No, not that I know of   Not selected:    Yes   Some medications can cause painful mouth sores or ulcers. In the last 3 weeks, have you started taking any of these medications? Scroll to see all options. Select all that apply.    None of the above   Not selected:    Alendronate (Fosamax)    Antineoplastics (methotrexate, 5-fluorouracil, doxorubicin, and melphalan)    Aspirin    Barbiturates (phenobarbital)    Blood pressure medications (including captopril, propranolol spironolactone, and thiazides)    Dapsone (Aczone)    Gold salts    Methyldopa (Aldomet)    Non-steroidal anti-inflammatory medications on a regular basis (Advil, ibuprofen, naproxen, Aleve)    Penicillin    Phenothiazines (Compazine, Robinul)    Phenytoin (Dilantin, Cerebyx)    Sulfonamides (sulfadiazine)    Tetracyclines (doxycycline, minocycline)    Tolbutamide   The next set of questions is about medications and medication-related allergies. Have you used any of these non-prescription medications for your current symptoms? Select all that apply.    None of the above   Not selected:    Acetaminophen (Tylenol)    Benzocaine topical gel (Anbesol, Zilactin-B)    Clotrimazole (Desenex, Micotrin)    Ibuprofen (Advil, Motrin)    Miconazole (Micaderm, Micatin)    Zinc oxide   Are you still taking these medications listed in your medical record? If you're not taking any of these, click Next. Select all that apply.    hydrOXYzine pamoate 25 MG capsule    levocetirizine 5 MG tablet    albuterol 1.25 MG/3ML nebulizer solution    spironolactone 50 MG tablet    budesonide-formoterol 160-4.5 MCG/ACT inhaler    lamoTRIgine 25 MG tablet    albuterol sulfate  (90 Base) MCG/ACT inhaler   Are you taking any other medications, vitamins, or supplements? Select one.    No   Not selected:    Yes   Have you ever had an allergic or bad reaction to any medication? Select one.    Yes   Not selected:     No   Have you had an allergic or bad reaction to any of these medications? Select all that apply.    No, not that I know of   Not selected:    Corticosteroid cream or paste, such as triamcinolone (Oralone), clobetasol (Cormax), or hydrocortisone    Antiviral drugs, such as acyclovir (Zovirax), valacyclovir (Valtrex), or famciclovir    Oral antifungal drugs, such as clotrimazole (Lotrimin, Mycelex), fluconazole (Diflucan), or nystatin (Bio-Statin)    Topical antifungal drugs, such as miconazole (Micatin, Tetterine)    Topical antibiotics, such as Mupirocin (Bactroban)   Have you had an allergic or bad reaction to any of these non-prescription medications? Select all that apply.    No, not that I know of   Not selected:    Acetaminophen (Tylenol)    Benzocaine (Anbesol, Zilactin-B)    Ibuprofen (Advil, Motrin)    Petroleum jelly (Vaseline)    Zinc oxide   Do you need a doctor's note? A doctor's note confirms that you received care today and states when you can return to school or work. It does not contain information about your diagnosis or treatment plan. Your provider will make the final decision on whether to give you a doctor's note. Doctor's notes CANNOT be backdated. Select one.    Today only (1 day)   Not selected:    Today and tomorrow (2 days)    3 days    No   Is there anything else you'd like to tell us about your symptoms?   The patient did not enter any additional information.   ----------   Medical history   The following information was received from the EMR on April 17, 2023.   Allergies:    CEFUROXIME AXETIL   - Allergy Type: Medication   - Reaction: Other (See Comments)   - Severity: Severe   - Clinical Status: Active   - Verification Status: Confirmed    AMOXICILLIN-POT CLAVULANATE   - Allergy Type: Medication   - Reaction: Nausea And Vomiting   - Severity: Severe   - Clinical Status: Active   - Verification Status: Confirmed    CEFACLOR   - Allergy Type: Medication   - Reaction: Other (See  Comments)   - Severity: Severe   - Clinical Status: Active   - Verification Status: Confirmed    CEFIXIME   - Allergy Type: Medication   - Reaction: Nausea And Vomiting, Shortness Of Breath   - Severity: Severe   - Clinical Status: Active   - Verification Status: Confirmed    CEFPROZIL   - Allergy Type: Medication   - Reaction: Shortness Of Breath   - Severity: Severe   - Clinical Status: Active   - Verification Status: Confirmed    CEPHALEXIN   - Allergy Type: Medication   - Reaction: Shortness Of Breath   - Severity: Severe   - Clinical Status: Active   - Verification Status: Confirmed    CEPHALOSPORINS   - Allergy Type: Medication   - Reaction: Shortness Of Breath   - Severity: Severe   - Clinical Status: Active   - Verification Status: Confirmed    LATEX   - Allergy Type: Medication   - Reaction: Shortness Of Breath   - Severity: Severe   - Clinical Status: Active   - Verification Status: Confirmed    PROMETHAZINE HCL   - Allergy Type: Medication   - Reaction: Other (See Comments)   - Severity: Moderate   - Clinical Status: Active   - Verification Status: Confirmed    LAMICTAL [LAMOTRIGINE]   - Allergy Type: Medication   - Reaction: Rash   - Severity: Moderate   - Clinical Status: Active   - Verification Status: Confirmed    OTHER   - Allergy Type:   - Reaction: Other (See Comments)   - Severity: Severe   - Clinical Status: Active   - Verification Status: Confirmed   Medications:    fluticasone (FLONASE) nasal spray   - Route: Nasal   - Start Date: August 24, 2022   - End Date: None   - Status: Active    hydrOXYzine pamoate (VISTARIL) capsule   - Route:   - Start Date: March 20, 2023   - End Date: None   - Status: Active    levocetirizine (XYZAL) tablet 5 mg   - Route: Oral   - Start Date: February 17, 2020   - End Date: None   - Status: Active    albuterol (PROVENTIL) nebulizer solution 0.042% 1.25 mg/3mL   - Route: Nebulization   - Start Date: February 17, 2021   - End Date: None   - Status: Active     spironolactone (ALDACTONE) tablet   - Route: Oral   - Start Date: March 20, 2023   - End Date: None   - Status: Active    budesonide-formoterol (SYMBICORT) inhaler 160-4.5 mcg/puff   - Route: Inhalation   - Start Date: March 20, 2023   - End Date: None   - Status: Active    cariprazine (VRAYLAR) capsule   - Route: Oral   - Start Date: July 05, 2022   - End Date: None   - Status: Active    lamoTRIgine (laMICtal) tablet   - Route: Oral   - Start Date: September 06, 2022   - End Date: None   - Status: Active    albuterol (PROVENTIL HFA;VENTOLIN HFA;PROAIR HFA) inhaler   - Route: Inhalation   - Start Date: March 20, 2023   - End Date: None   - Status: Active   Problem list:    Abnormal weight gain   - Category: Problem List Item   - Health Status:   - Start Date: February 04, 2021   - End Date: None   - Status: Inactive    Allergy   - Category: Problem List Item   - Health Status:   - Start Date: February 04, 2021   - End Date: None   - Status: Active    Anemia   - Category: Problem List Item   - Health Status:   - Start Date: February 04, 2021   - End Date: None   - Status: Active    Anovulation   - Category: Problem List Item   - Health Status:   - Start Date: February 04, 2021   - End Date: None   - Status: Active    Asthma exacerbation   - Category: Problem List Item   - Health Status:   - Start Date: February 04, 2021   - End Date: November 30, 2021   - Status: Resolved    Moderate persistent asthma without complication   - Category: Problem List Item   - Health Status:   - Start Date: February 04, 2021   - End Date: None   - Status: Active    Body mass index (BMI) of 50-59.9 in adult   - Category: Problem List Item   - Health Status:   - Start Date: February 04, 2021   - End Date: None   - Status: Active    Chronic pulmonary embolism   - Category: Problem List Item   - Health Status:   - Start Date: February 04, 2021   - End Date: None   - Status: Active    Diarrhea   - Category: Problem List Item   - Health  Status:   - Start Date: February 04, 2021   - End Date: November 30, 2021   - Status: Resolved    DUB (dysfunctional uterine bleeding)   - Category: Problem List Item   - Health Status:   - Start Date: February 04, 2021   - End Date: None   - Status: Active    Dysphagia   - Category: Problem List Item   - Health Status:   - Start Date: February 04, 2021   - End Date: November 30, 2021   - Status: Resolved    Fertility testing   - Category: Problem List Item   - Health Status:   - Start Date: February 04, 2021   - End Date: None   - Status: Active    Encounter for screening for other disorder   - Category: Problem List Item   - Health Status:   - Start Date: February 04, 2021   - End Date: None   - Status: Inactive    Headache, migraine   - Category: Problem List Item   - Health Status:   - Start Date: February 04, 2021   - End Date: None   - Status: Active    Memory loss   - Category: Problem List Item   - Health Status:   - Start Date: February 04, 2021   - End Date: None   - Status: Active    Bipolar depression   - Category: Problem List Item   - Health Status:   - Start Date: February 04, 2021   - End Date: None   - Status: Active    MRSA infection   - Category: Problem List Item   - Health Status:   - Start Date: February 04, 2021   - End Date: None   - Status: Active    Nausea and vomiting   - Category: Problem List Item   - Health Status:   - Start Date: February 04, 2021   - End Date: November 30, 2021   - Status: Resolved    Neck pain   - Category: Problem List Item   - Health Status:   - Start Date: February 04, 2021   - End Date: None   - Status: Active    Morbid (severe) obesity due to excess calories   - Category: Problem List Item   - Health Status:   - Start Date: February 04, 2021   - End Date: None   - Status: Active    Other specified sites of sprains and strains   - Category: Problem List Item   - Health Status:   - Start Date: February 04, 2021   - End Date: None   - Status: Active    Snoring    - Category: Problem List Item   - Health Status:   - Start Date: February 04, 2021   - End Date: None   - Status: Active    Tinea pedis   - Category: Problem List Item   - Health Status:   - Start Date: February 04, 2021   - End Date: None   - Status: Active    Urinary tract infection   - Category: Problem List Item   - Health Status:   - Start Date: February 04, 2021   - End Date: November 30, 2021   - Status: Resolved    PCOS (polycystic ovarian syndrome)   - Category: Problem List Item   - Health Status:   - Start Date: November 30, 2021   - End Date: None   - Status: Active    Exposure to COVID-19 virus   - Category: Problem List Item   - Health Status:   - Start Date: January 02, 2022   - End Date: None   - Status: Active    Bacterial sinusitis   - Category: Problem List Item   - Health Status:   - Start Date: August 24, 2022   - End Date: None   - Status: Active

## 2023-08-21 ENCOUNTER — E-VISIT (OUTPATIENT)
Dept: FAMILY MEDICINE CLINIC | Facility: TELEHEALTH | Age: 33
End: 2023-08-21

## 2023-08-21 PROCEDURE — BRIGHTMDVISIT: Performed by: NURSE PRACTITIONER

## 2023-08-21 NOTE — EXTERNAL PATIENT INSTRUCTIONS
View Doctor's Note     Diagnosis   Allergic rhinitis (allergies)   My name is JOE Gomez, and I'm a healthcare provider at [facility]. I reviewed your interview and I see that you have allergic rhinitis, also known as seasonal allergies or hay fever. Allergic rhinitis is not an infection. It isn't caused by a virus or bacteria. Although allergy symptoms can be unpleasant, your symptoms aren't part of a more serious condition.   Based on what you told me in your interview, I haven't prescribed any antibiotics. Antibiotics won't help with allergies. Your symptoms suggest you have allergies, not an infection:    An itchy nose or sneezing.    You haven't had symptoms long enough to suggest a bacterial infection.    You don't have a fever. Viral and bacterial infections, but not allergies, can lead to a fever.    You don't have a sore throat. Bacterial infections like strep throat cause a severe sore throat.   I've given you a doctor's note for 1 day.   Medications   Your pharmacy   [facility] [id number] [place] [address] [address] [place] [id number] [phone]     Prescription   Methylprednisolone (4mg): On day 1, take 2 tablets by mouth before breakfast, 1 tablet by mouth after lunch, 1 tablet by mouth after dinner, and 2 tablets by mouth at bedtime. On day 2, take 1 tablet by mouth before breakfast, 1 tablet by mouth after lunch, 1 tablet by mouth after dinner, and 2 tablets by mouth at bedtime. On day 3, take 1 tablet by mouth before breakfast, 1 tablet by mouth after lunch, 1 tablet by mouth after dinner, and 1 tablet by mouth at bedtime. On day 4, take 1 tablet by mouth before breakfast, 1 tablet by mouth after lunch, and 1 tablet by mouth at bedtime. On day 5, take 1 tablet by mouth before breakfast and 1 tablet by mouth at bedtime. On day 6, take 1 tablet by mouth before breakfast.   Non-prescription   Fluticasone (50mcg): Spray 2 times in each nostril daily for nasal symptoms due to allergies  or sinusitis. Fluticasone needs to be used every day to be effective. It may take up to a week for the full effects of the medication to be seen. Brands to look for include Flonase.   About your diagnosis   Allergic rhinitis, also called allergies or hay fever, is very common. Symptoms can seem similar to a cold, but they're caused by an allergen, not a virus. These are the key things to know about allergies:    The best way to treat allergies is to avoid the cause of your symptoms. Medications can also help your symptoms.    Symptoms range in severity. They may be only mildly annoying or they may seriously affect day-to-day life.    Symptoms can be seasonal, triggered by tree pollen, grasses, and weeds. Mold, dust, or pet dander can cause allergy symptoms in any season.   In addition to symptoms affecting the nose and eyes, allergies can cause fatigue and irritability.   What to expect   Despite the unpleasant symptoms, the good news is that you're already aware of your allergies. If you follow this treatment plan, you should feel better soon.   When to seek care   Call us at [phone]   with any sudden or unexpected symptoms.    Severe allergy symptoms.    Your normal allergy medications stop working or cause uncomfortable side effects.    Your sinus pain continues for 10 days or more, without improvement.    Severe chest pain.   Other treatment   If possible, avoid the allergens that trigger your allergy symptoms. If you normally use medications, inhalers, or an asthma action plan, continue using them as prescribed.   Prevention   Avoid your known allergens, if possible. Allergy medications work best if you take them before your symptoms develop.   Avoid smoke and air pollution. Smoke can make infections worse.    Flu vaccine information   Who should get a flu vaccine?   Everyone 6 months of age and older should get a yearly flu vaccine.   When should I get vaccinated?   You should get a flu vaccine by the  end of October. Once you're vaccinated, it takes about two weeks for antibodies to develop and protect you against the flu. That's why it's important to get vaccinated as soon as possible.   After October, is it too late to get vaccinated?   No. You should still get vaccinated. As long as the flu viruses are still in your community, flu vaccines will remain available, even into January of next year or later.   Why do I need a flu vaccine EVERY year?   Flu viruses are constantly changing, so flu vaccines are usually updated from one season to the next. Your protection from the flu vaccine also lessens over time.   Is the flu vaccine safe?   Yes. Over the last 50 years, hundreds of millions of Americans have safely received the flu vaccines.   What are the side effects of flu vaccines?   You CANNOT get the flu from a flu vaccine. Common side effects of the flu shot include soreness, redness and/or swelling where the shot was given, low grade fever, and aches. Common side effects of the nasal spray flu vaccine for adults include runny nose, headaches, sore throat, and cough. For children, side effects include wheezing, vomiting, muscle aches, and fever.   Does the flu vaccine increase your risk of getting COVID-19?   No. There is no evidence that getting a flu vaccine increases your risk of getting COVID-19.   Is it safe to get the flu vaccine along with a COVID-19 vaccine?   Yes. It's safe to get the flu vaccine with a COVID-19 vaccine or booster.   Contact your healthcare provider TODAY for details on when and where to get your flu vaccine.    Coronavirus (COVID-19) information   Common symptoms of COVID-19 include fever, cough, shortness of breath, fatigue, muscle or body aches, headaches, new loss of sense of taste or smell, sore throat, stuffy or runny nose, nausea or vomiting, and diarrhea. Most people who get COVID-19 have mild symptoms and can rest at home until they get better. Elderly people and those with  chronic medical problems may be at risk for more serious complications.   FAQs about the COVID-19 vaccine   There are four authorized COVID-19 vaccines: Ilir & Ilir's Rain Vaccine (J&J/Rain), Moderna, Novavax, and Pfizer-BioNTech (Pfizer). The J&J/Rain and Novavax vaccines are approved for use in people aged 18 and older. The Moderna and Pfizer vaccines are approved for those aged 6 months and older. All four are available at no cost. Even if you don't have health insurance, you can still get the COVID-19 vaccine for free.   Which vaccine is the best? Which vaccine should I get?   All four vaccines are highly effective. Even if you get COVID-19 after being vaccinated, all of the vaccines help prevent severe disease, hospitalization, and complications.   Most people should get whichever vaccine is first available to them. However, women younger than 50 years old should consider the rare risk of blood clots with low platelets after vaccination with the J&J/Rain vaccine. This risk hasn't been seen with the other three vaccines.   Are the vaccines safe?   Yes. Hundreds of millions of people in the [place] have already safely received COVID-19 vaccines under the most intense safety monitoring in the history of the [place].   Do I need the vaccine if I've already had COVID?   Yes. Vaccination helps protect you even if you've already had COVID.   If you had COVID-19 and had symptoms, wait to get vaccinated until you've recovered and completed your isolation period.   If you tested positive for COVID-19 but did not have symptoms, you can get vaccinated after 5 full days have passed since you had a positive test, as long as you don't develop symptoms.   How many doses of the vaccine do I need?   Visit www.cdc.gov/coronavirus/2019-ncov/vaccines/stay-up-to-date.html   to find out how to stay up to date with your COVID-19 vaccines.   I'm immunocompromised. How many doses of the vaccine do I need?   For information on  how immunocompromised people can stay up to date with their COVID-19 vaccines, visit www.cdc.gov/coronavirus/2019-ncov/vaccines/recommendations/immuno.html  .   What are the common side effects of the vaccine?   A sore arm, tiredness, headache, and muscle pain may occur within two days of getting the vaccine and last a day or two. For the Moderna or Pfizer vaccines, side effects are more common after the second dose. People over the age of 55 are less likely to have side effects than younger people.   After I'm up to date on vaccines, can I still get or spread COVID?   Yes, you can still get COVID, but your disease should be milder. And your risk of serious illness, hospitalization, and complications will be much lower, especially if you're up to date. Unfortunately, you can still spread COVID if you've been vaccinated. That's why it's important to follow isolation guidelines if you get sick or test positive.   After I'm up to date on vaccines, can I go back to normal?   You should still wear a mask indoors in public if:    It's required by laws, rules, regulations, or local guidance.    You have a weakened immune system.    Your age puts you at increased risk of severe disease.    You have a medical condition that puts you at increased risk of severe disease.    Someone in your household has a weakened immune system, is at increased risk for severe disease, or is unvaccinated.    You're in an area of high transmission.   Where can I get a COVID-19 vaccine?   Visit [facility]'s website for more information. To find a COVID-19 vaccination site near you, visit www.vaccines.gov/  , call [phone]  , or text your zip code to [phone] ([place]). Message and data rates may apply.   What about travel?   Travel increases your risk of exposure to COVID-19. For more information, see www.cdc.gov/coronavirus/2019-ncov/travelers/index.html  .   I've had close contact with someone who has COVID. Do I need to quarantine, and  if so, for how long?   For the most current answer, including a calculator to determine whether you need to stay home and for how long, visit www.cdc.gov/coronavirus/2019-ncov/your-health/quarantine-isolation.html  .   I've tested positive for COVID. How long do I need to isolate?   For the latest recommendations, including a calculator to determine how long you need to stay home, visit www.cdc.gov/coronavirus/2019-ncov/your-health/quarantine-isolation.html  .   What if I develop symptoms that might be from COVID?   For the latest recommendations on what to do if you're sick, including when to seek emergency care, visit www.cdc.gov/coronavirus/2019-ncov/if-you-are-sick/steps-when-sick.html  .   Your provider   Your diagnosis was provided by JOE Gomez, a member of your trusted care team at [facility].   If you have any questions, call us at [phone]  .   View Doctor's Note     Expires on 09/20/23

## 2023-08-21 NOTE — E-VISIT TREATED
Chief Complaint: Cold, flu, COVID, sinus, hay fever, or seasonal allergies   Patient introduction   Patient is 33-year-old female with cough, congestion, nasal discharge, itchy nose or sneezing, and headache that started less than 48 hours ago. Regarding date of symptom onset, patient writes: 08/19/2023.   COVID-19 testing history, vaccination status, and exposure:    Has not been tested for COVID-19 since symptom onset.    Patient was prompted to take a self-test during the interview, but does not have a COVID-19 test kit.    Received 2 doses of the COVID-19 vaccine (Pfizer, Pfizer). Received their most recent dose of the vaccine more than 14 days ago.    No known exposure to a person with a confirmed or suspected case of COVID-19.    No travel outside local community within the last 14 days.   Risk factors for severe disease from COVID-19 infection    BMI >= 40.    Asthma.   General presentation   Symptoms came on gradually.   Fever:    No fever.   Sinus and nasal symptoms:    Nasal discharge.    Itchy nose or sneezing.    Clear nasal drainage.    Nasal drainage is thick.    1 to 3 episodes of antibiotic treatment for sinus infection in the last year.    Sinus pain or pressure on or around the forehead, eyes, and nose.    Patient first noticed sinus pain less than 5 days ago.    Sinus pain is worse with Valsalva.    No postnasal drip.    No history of unhealed nasal septal ulcer/nasal wound.    No history of deviated septum or nasal polyps.   Throat symptoms:    No sore throat.   Head and body aches:    Headache described as moderate (4 to 6 on a scale of 1 to 10).    No sweats.    No chills.    No myalgia.    No fatigue.   Cough:    Cough is not noticeably worse depending on time of day    Cough is mildly productive of sputum.    Describes color of sputum as white/frothy.   Wheezing and shortness of breath:    Has asthma diagnosis.    Wheezing.    Patient has been prescribed an albuterol inhaler and an inhaled  steroid with long-acting bronchodilator for asthma, but is not currently using any asthma medications because they have run out.    Patient requests a prescription of albuterol in the form of an inhaler.    No COPD diagnosis.    No shortness of breath.    Current cold symptoms do not aggravate their asthma.   Chest pain:    No chest pain.   Ear symptoms:    Current symptoms include pain and pressure in the ear(s).   Dizziness:    No dizziness.   Allergies:    Patient has known seasonal allergies, known pet allergies, and known dust allergies.    Patient thinks symptoms are allergy-related.   Flu exposure:    No recent known exposure to a person with a confirmed flu diagnosis.    Has not had a flu vaccine this season.   Patient is taking over-the-counter medications for current symptoms, including loratadine.   Review of red flags/alarm symptoms:    No changes in alertness or awareness.    No nuchal rigidity.    No symptoms suggesting intracranial hemorrhage.    No decreased urination.   Pregnancy/menstrual status/breastfeeding:    Not pregnant.    Not breastfeeding.    Regarding date of last menstrual period, patient writes: 7/27/23.   Self-exam:    Height: 162 centimeters    Weight: 112.0 kilograms    No difficulty moving their chin toward their chest.    Swollen/painful neck lymph nodes.   Recent antibiotic use:    Has not taken antibiotics for similar symptoms within the past month.   Current medications   Currently taking hydrOXYzine pamoate 25 MG capsule, albuterol 1.25 MG/3ML nebulizer solution, spironolactone 50 MG tablet, budesonide-formoterol 160-4.5 MCG/ACT inhaler, lamoTRIgine 25 MG tablet, and albuterol sulfate  (90 Base) MCG/ACT inhaler.   Medication allergies    Cephalosporins (reaction: raised, red, itchy spots with each spot lasting less than 24 hours, swelling of the mouth, eyes, lips, or tongue, and feeling light-headed or faint)   Medication contraindication review   Patient has history of  depression. Therefore, the following medication(s) will not be prescribed:    Metoclopramide.   No history of metoclopramide-associated dystonic reaction and tardive dyskinesia.   No known history of amoxicillin-clavulanate-associated cholestatic jaundice or hepatic impairment.   No known history of azithromycin-associated cholestatic jaundice or hepatic impairment.   Past medical history   Immune conditions: No immunocompromising conditions.   No history of cancer.   Social history   Never smoked tobacco.   High-risk household contacts:    Household contact with asthma.   Patient-submitted comments   Patient was asked if they had anything to add about their symptoms. Patient writes: Ear pain and pressure is adding to my headache I believe .   Patient requests a 1-day excuse note.   Assessment   Allergic rhinitis.   This is the likely diagnosis based on patient's interview responses, including:    Symptom profile    Patient's belief that their symptoms are allergy-related   Plan   Medications:    methylprednisolone 4 mg tablets in a dose pack RX 4mg as directed PO qid 6d for allergies. On day 1, take 2 tablets by mouth before breakfast, 1 tablet by mouth after lunch, 1 tablet by mouth after dinner, and 2 tablets by mouth at bedtime. On day 2, take 1 tablet by mouth before breakfast, 1 tablet by mouth after lunch, 1 tablet by mouth after dinner, and 2 tablets by mouth at bedtime. On day 3, take 1 tablet by mouth before breakfast, 1 tablet by mouth after lunch, 1 tablet by mouth after dinner, and 1 tablet by mouth at bedtime. On day 4, take 1 tablet by mouth before breakfast, 1 tablet by mouth after lunch, and 1 tablet by mouth at bedtime. On day 5, take 1 tablet by mouth before breakfast and 1 tablet by mouth at bedtime. On day 6, take 1 tablet by mouth before breakfast. Amount is 21 tab.    fluticasone 50 mcg/actuation nasal spray,suspension OTC 50mcg 2 sprays each nostril nasal qd 30d for nasal symptoms due to  allergies or sinusitis. Fluticasone needs to be used every day to be effective. It may take up to a week for the full effects of the medication to be seen. Brands to look for include Flonase. Amount is 16 g.   The patient's prescription will be sent to:   [facility] [id number]   [address] [place] [address]   Phone: [phone]     Fax: [phone]   Patient informed to purchase OTC medication.   Other:   Patient was given an excuse note for 1 day.   Education:    Condition and causes    Prevention    Treatment and self-care    When to call provider   ----------   Electronically signed by JOE Gomez on 2023-08-21 at 12:26PM   ----------   Patient Interview Transcript:   Which of these symptoms are bothering you? Select all that apply.    Cough    Stuffed-up nose or sinuses    Runny nose    Itchy nose or sneezing    Headache   Not selected:    Shortness of breath    Itchy or watery eyes    Loss of smell or taste    Sore throat    Hoarse voice or loss of voice    Fever    Sweats    Chills    Muscle or body aches    Fatigue or tiredness    Nausea or vomiting    Diarrhea    I don't have any of these symptoms   When did your current symptoms start? Select one.    Less than 48 hours ago   Not selected:    3 to 5 days ago    6 to 9 days ago    10 to 14 days ago    2 to 3 weeks ago    3 to 4 weeks ago    More than a month ago   Do you know the exact date your symptoms started? If so, enter the date as MM/DD/YY. Select one.    Yes (specify): 08/19/2023   Not selected:    No   Did your symptoms come on suddenly or gradually? Select one.    Gradually   Not selected:    Suddenly    I'm not sure   Since your current symptoms started, have you had a viral test for COVID-19? - This includes home self-tests as well as nose swab or saliva tests done at a doctor's office, lab, or testing site. - It does NOT include antibody tests, which use a blood sample to test for past infection. Select one.    No    "Not selected:    Yes   Taking a home COVID test can help your provider give you the best care. If you have a COVID test kit, take the test now before continuing with this interview. Do you have a COVID test kit? Select one.    No, I don't have a test kit   Not selected:    Yes, and I'll take a test now    Yes, but I prefer not to take a test now   Have you gotten the COVID-19 vaccine? Select one.    Yes   Not selected:    No   How many total doses of the COVID-19 vaccine have you gotten? This includes boosters as well as additional doses for those who are immunocompromised. Select one.    2 doses   Not selected:    1 dose    3 doses    4 doses    5 doses   1st dose    Pfizer   Not selected:    J&J/Rain    Moderna    Novavax   2nd dose    Pfizer   Not selected:    J&J/Rain    Moderna    Novavax   When did you get your most recent dose of the COVID-19 vaccine?    More than 14 days ago   Not selected:    Less than 48 hours (2 days) ago    48 to 72 hours (3 days) ago    3 to 5 days ago    5 to 7 days ago    7 to 14 days ago   In the last 14 days, have you traveled outside of your local community? This includes travel by car, RV, bus, train, or plane. Travel increases your chances of getting and spreading COVID-19. Select one.    No   Not selected:    Yes   In the last 14 days, have you had close contact with someone who has coronavirus (COVID-19)? \"Close contact\" means any of these: - Living in the same household as someone with COVID-19. - Caring for someone with COVID-19. - Being within 6 feet of someone with COVID-19 for a total of at least 15 minutes over a 24-hour period. For example, three 5-minute exposures for a total of 15 minutes. - Being in direct contact with respiratory droplets from someone with COVID-19 (being coughed on, kissing, sharing utensils). Select one.    No, not that I know of   Not selected:    Yes, a confirmed case    Yes, a suspected case   You mentioned having a headache. On a scale of " "1 to 10, how severe is your headache pain? Select one.    Moderate (4 to 6)   Not selected:    Mild (1 to 3)    Severe (7 to 9)    Unbearable (10)    The worst headache of my life (10+)   Do you cough so hard that it's made you gag or vomit? By gag, we mean has your coughing made you choke or dry heave? Select all that apply.    No   Not selected:    Yes, my coughing has made me gag    Yes, my coughing has made me vomit   When is your cough the worst? Select all that apply.    I haven't noticed a difference depending on time of day   Not selected:    In the morning, or when I wake up    During the day    At nighttime, or while I'm sleeping   Are you coughing up mucus or phlegm? Select one.    Yes, a little   Not selected:    No, my cough is dry    Yes, a lot   What color is most of the mucus or phlegm that you're coughing up? Select one.    White/frothy   Not selected:    Clear    Yellow or yellowish    Green or greenish    Red or pink    I'm not sure   Do you feel sinus pain or pressure in any of these areas?    In my forehead    Around my eyes    Behind my nose   Not selected:    In my cheeks    In my upper teeth or jaw    No   When did you first notice your sinus pain or pressure? Select one.    Less than 5 days ago   Not selected:    5 to 9 days ago    10 to 14 days ago    2 to 4 weeks ago    1 month ago or longer   Does coughing, sneezing, or leaning forward make your sinuses feel worse? Select one.    Yes   Not selected:    No   What color is your nasal drainage? Select one.    Clear   Not selected:    White    Yellow or yellowish    Green or greenish    My nose is stuffed but not draining or running   Is your nasal drainage thick or thin? Select one.    Thick   Not selected:    Thin   Is there any drainage (mucus) going down the back of your throat? This kind of drainage is also called \"postnasal drip.\" Select one.    No, not that I know of   Not selected:    Yes   Since your symptoms started, have you felt " dizzy? Select one.    No   Not selected:    Yes, but I can continue with my regular daily activities    Yes, and it makes it hard to stand, walk, or do daily activities   Do you have chest pain? You might also feel it as discomfort, aching, tightness, or squeezing in the chest. Select one.    No   Not selected:    Yes   Have you urinated at least 3 times in the last 24 hours? Select one.    Yes   Not selected:    No   Changes in alertness or awareness may mean you need emergency care. Since your symptoms started, have you had any of these? Select all that apply.    None of the above   Not selected:    Confusion    Slurred speech    Not knowing where you are or what day it is    Difficulty staying conscious    Fainting or passing out   Do your symptoms include a whistling sound, or wheezing, when you breathe? Select one.    Yes   Not selected:    No    I'm not sure   Do you have any of these symptoms in your ear(s)? Select all that apply.    Pain    Pressure   Not selected:    Fullness    Crackling or popping    Plugged or blocked sensation    None of the above   Can you move your chin toward your chest?    Yes   Not selected:    No, my neck is too stiff   Are your glands/lymph nodes swollen, or does it hurt when you touch them?    Yes   Not selected:    No, not that I can tell   In the past week, has anyone around you (such as at school, work, or home) had a confirmed diagnosis of the flu? A confirmed diagnosis means that a nose swab was done to verify a flu infection. Select all that apply.    No, not that I know of   Not selected:    I live with someone who has the flu    I've been within touching distance of someone who has the flu    I've walked by, or sat about 3 feet away from, someone who has the flu    I've been in the same building as someone who has the flu   Have you ever been diagnosed with asthma? Select one.    Yes   Not selected:    No   Are your cold symptoms making your asthma worse? Select one.     No   Not selected:    Yes   Are you currently using any medications or inhalers for your asthma? Select one.    I'm supposed to, but I ran out   Not selected:    Yes    No   What medications or inhalers are you supposed to be using for your asthma? Select all that apply.    Albuterol inhaler, as needed (such as ProAir, Proventil, Ventolin)    Inhaled steroid with long-acting bronchodilator (such as Advair, Dulera, Symbicort)   Not selected:    Inhaled steroid (such as Asmanex, Qvar, Pulmicort, Flovent)    Leukotriene modifier (such as Singulair)    Oral steroids (such as prednisone)    Other (specify)   Do you need a refill of albuterol? Select one.    Yes   Not selected:    No   What form of albuterol do you need? Select one.    Inhaler   Not selected:    Nebulizer solution   Have you ever been diagnosed with chronic obstructive pulmonary disease (COPD)? Select one.    No, not that I know of   Not selected:    Yes   In the past month, have you taken antibiotics for similar symptoms? Examples of antibiotics include amoxicillin, amoxicillin-clavulanate (Augmentin), penicillin, cefdinir (Omnicef), doxycycline, and clindamycin (Cleocin). Select one.    No   Not selected:    Yes (specify)   In the last year, how many times were you treated with antibiotics for a sinus infection? Select one.    1 to 3 times   Not selected:    None    4 or more times   Have you been diagnosed with a deviated septum or nasal polyps? The nose is divided into two nostrils by the septum. A crooked septum is called a deviated septum. Nasal polyps are growths inside the nose or sinuses. Select one.    No, not that I know of   Not selected:    Yes, but I had surgery to treat them    Yes, I have a deviated septum    Yes, I have nasal polyps    Yes, I have a deviated septum and nasal polyps   Do you have a sore inside your nose that won't heal? Select one.    No, not that I know of   Not selected:    Yes   Do you have allergies (pollen, dust  mites, mold, animal dander)? Select one.    Yes   Not selected:    No, not that I know of   What kind of allergies do you have? Select all that apply.    Seasonal allergies (hay fever)    Pet allergies    Dust allergies   Not selected:    None of the above    I'm not sure   Do you think your symptoms could be allergy-related? Select one.    Yes   Not selected:    No    I'm not sure   Have you had a flu shot this season? Select one.    No   Not selected:    Yes, less than 2 weeks ago    Yes, 2 to 4 weeks ago    Yes, 1 to 3 months ago    Yes, 3 to 6 months ago    Yes, more than 6 months ago   Are you pregnant? Select one.    No   Not selected:    Yes   When was your last menstrual period? If you don't currently have periods or no longer have periods, please briefly explain.    23   Within the last 2 weeks, have you: - Given birth - Had a miscarriage - Had a pregnancy loss - Had an  Being postpartum (live birth or loss) within the last 2 weeks increases your risk of flu complications. Select one.    No   Not selected:    Yes   Are you breastfeeding? Select one.    No   Not selected:    Yes   The flu and COVID-19 can be more serious for people in certain groups. The next few questions help us figure out if you or anyone you live with is at higher risk for complications from these infections. Do any of these statements apply to you? Select all that apply.    None of the above   Not selected:    I'm     I'm     I'm Black    I'm  or    Do you smoke tobacco? Select one.    No   Not selected:    Yes, every day    Yes, some days    No, I quit   Do you have any of these conditions? Select all that apply.    None of the above   Not selected:    Chronic lung disease, such as cystic fibrosis or interstitial fibrosis    Heart disease, such as congenital heart disease, congestive heart failure, or coronary artery disease    Disorder of the brain, spinal cord, or nerves and  muscles, such as dementia, cerebral palsy, epilepsy, muscular dystrophy, or developmental delay    Metabolic disorder or mitochondrial disease    Cerebrovascular disease, such as stroke or another condition affecting the blood vessels or blood supply to the brain    Down syndrome    Mood disorder, including depression or schizophrenia spectrum disorders    Substance use disorder, such as alcohol, opioid, or cocaine use disorder    Tuberculosis   Do you live in a group care setting? Examples include: - Nursing home - Residential care - Psychiatric treatment facility - Group home - [facility] - Board and care home - Homeless shelter - Foster care setting Select one.    No   Not selected:    Yes   Are you a healthcare worker? Select one.    No   Not selected:    Yes   People with a very high body mass index (BMI) are at higher risk for developing complications from the flu and severe illness from COVID-19. To determine your BMI, we need to know your weight and height. Please enter your weight (in pounds).    Weight   Please enter your height.    Height   Do you have any of these conditions that can affect the immune system? Scroll to see all options. Select all that apply.    None of these   Not selected:    History of bone marrow transplant    Chronic kidney disease    Chronic liver disease (including cirrhosis)    HIV/AIDS    Inflammatory bowel disease (Crohn's disease or ulcerative colitis)    Lupus    Moderate to severe plaque psoriasis    Multiple sclerosis    Rheumatoid arthritis    Sickle cell anemia    Alpha or beta thalassemia    History of solid organ transplant (kidney, liver, or heart)    History of spleen removal    An autoimmune disorder not listed here (specify)    A condition requiring treatment with long-term use of oral steroids (such as prednisone, prednisolone, or dexamethasone) (specify)   Have you ever been diagnosed with cancer? Select one.    No   Not selected:    Yes, I have cancer now    Yes,  but I'm in remission   Do any of these apply to you? Select all that apply.    None of the above   Not selected:    I've been hospitalized within the last 5 days    I have diabetes    I'm in close contact with a child in    The flu and COVID-19 can be more serious for people in certain groups. Do any of these apply to the people who live with you? Select all that apply.    None of the above   Not selected:    Under age 5    Over age 65            Black     or     Pregnant    Has given birth, had a miscarriage, had a pregnancy loss, or had an  in the last 2 weeks   Does any member of your household have any of these medical conditions? Select all that apply.    Asthma   Not selected:    Disorders of the brain, spinal cord, or nerves and muscles, such as dementia, cerebral palsy, epilepsy, muscular dystrophy, or developmental delay    Chronic lung disease, such as COPD or cystic fibrosis    Heart disease, such as congenital heart disease, congestive heart failure, or coronary artery disease    Cerebrovascular disease, such as stroke or another condition affecting the blood vessels or blood supply to the brain    Blood disorders, such as sickle cell disease    Diabetes    Metabolic disorders such as inherited metabolic disorders or mitochondrial disease    Kidney disorders    Liver disorders    Weakened immune system due to illness or medications such as chemotherapy or steroids    Children under the age of 19 who are on long-term aspirin therapy    Extreme obesity (BMI > 40)    None of the above   Do you have any of these conditions? Scroll to see all options. Select all that apply.    Depression   Not selected:    Aspirin triad (also known as Samter's triad or ASA triad)    Asthma or hives from taking aspirin or other NSAIDs, such as ibuprofen or naproxen    Blockage or narrowing of the blood vessels of the heart    Blood clotting disorder    Blood dyscrasia,  such anemia, leukemia, lymphoma, or myeloma    Bone marrow depression    Catecholamine-releasing paraganglioma    Congenital long QT syndrome    Difficulty urinating or completely emptying your bladder    Uncorrected electrolyte abnormalities    Fungal infection    Gastrointestinal (GI) bleeding    Gastrointestinal (GI) obstruction    G6PD deficiency    Recent heart attack    High blood pressure    Irregular heartbeat or heart rhythm    Mononucleosis (mono)    Myasthenia gravis    Parkinson's disease    Pheochromocytoma    Reye syndrome    Seizure disorder    Thyroid disease    Ulcerative colitis    None of the above   Have you ever had either of these conditions? Select all that apply.    No   Not selected:    Metoclopramide-associated dystonic reaction    Tardive dyskinesia   Just a few more questions about medications, and then you're finished. Have you used any non-prescription medications or nasal sprays for your current symptoms? Examples include saline sprays, decongestants, NyQuil, and Tylenol. Select one.    Yes   Not selected:    No   Which of these non-prescription medications have you tried? Scroll to see all options. Select all that apply.    Loratadine (Alavert, Claritin)   Not selected:    Acetaminophen (Tylenol)    Budesonide (Rhinocort)    Cetirizine (Zyrtec)    Chlorpheniramine (Aller-chlor, Chlor-Trimeton)    Cromolyn (NasalCrom)    Dextromethorphan (Delsym, Robitussin, Vicks DayQuil Cough)    Diphenhydramine (Benadryl)    Fexofenadine (Allegra)    Fluticasone (Flonase)    Guaifenesin (Mucinex)    Guaifenesin/dextromethorphan (Delsym DM, Mucinex DM, Robitussin DM)    Ibuprofen (Advil, Motrin, Midol)    Ketotifen (Alaway, Zaditor)    Naphazoline-pheniramine (Naphcon-A, Opcon-A, Visine-A)    Omeprazole (Prilosec)    Oxymetazoline (Afrin)    Phenylephrine (Sudafed PE)    Triamcinolone (Nasacort)    None of the above   Have you taken any monoamine oxidase inhibitor (MAOI) medications in the last 14  "days? Examples include rasagiline (Azilect), selegiline (Eldepryl, Zelapar), isocarboxazid (Marplan), phenelzine (Nardil), and tranylcypromine (Parnate). Select one.    No, not that I know of   Not selected:    Yes   Do you take Kynmobi or Apokyn (apomorphine)? Select one.    No   Not selected:    Yes   Are you still taking these medications listed in your medical record? If you're not taking any of these, click Next. Select all that apply.    hydrOXYzine pamoate 25 MG capsule    albuterol 1.25 MG/3ML nebulizer solution    spironolactone 50 MG tablet    budesonide-formoterol 160-4.5 MCG/ACT inhaler    lamoTRIgine 25 MG tablet    albuterol sulfate  (90 Base) MCG/ACT inhaler   Are you taking any other medications, vitamins, or supplements? Select one.    No   Not selected:    Yes   Have you ever had an allergic or bad reaction to any medication? Select one.    Yes   Not selected:    No   Have you had an allergic or bad reaction to any of these medications? Select all that apply.    No, not that I know of   Not selected:    Baloxavir (Xofluza)    Benzonatate (Tessalon Perles)    Fluconazole, itraconazole, or terconazole (brands include Diflucan, Sporanox, Terazol)    Oseltamivir (Tamiflu) or zanamivir (Relenza)    Paxlovid, nirmatrelvir, or ritonavir (Norvir)   Have you had an allergic or bad reaction to any of these antibiotic medications? Select all that apply.    Cephalexin or any \"cef-\" antibiotic, such as cefazolin, cefdinir, cefuroxime, ceftriaxone, ceftazidime, or cefepime (Brands include Ancef, Ceftin, Fortaz, Keflex, Maxipime, Rocephin, and Simplicef)   Not selected:    Penicillin or any \"-cillin\" antibiotic, such as amoxicillin, ampicillin, dicloxacillin, nafcillin, or piperacillin (Brands include Augmentin, Unasyn, and Zosyn)    Tetracycline or any \"-cycline\" antibiotic, such as doxycycline, demeclocycline, minocycline (Brands include Declomycin, Doryx, Dynacin, Oracea, Monodox, Panmycin, and " "Vibramycin)    Ciprofloxacin or any \"-floxacin\" antibiotic, such as gemifloxacin, levofloxacin, moxifloxacin, or ofloxacin (Brands include Factive, Cipro, Floxin, and Levaquin)    Azithromycin or any \"-thromycin\" antibiotic, such as erythromycin or clarithromycin (Brands include Biaxin, Erythrocin, Z-jose a, and Zithromax)    Clindamycin or lincomycin (Brands include Cleocin and Lincocin)    No, not that I know of   When you had an allergic or bad reaction to cephalexin or another \"cef-\" antibiotic, did you have any of these symptoms? Select all that apply.    Raised, red, itchy spots with each spot lasting less than 24 hours    Swelling of the mouth, eyes, lips, or tongue    Feeling light-headed or faint   Not selected:    Blisters or ulcers on the lips, mouth, eyes, or vagina    Peeling skin    Breathing difficulties    A fast heartbeat    Clammy skin    Confusion and anxiety    Collapsing or losing consciousness    Joint pains    Problems with kidneys, lungs, or liver    None of the above   Have you had an allergic or bad reaction to any of these medications? Select all that apply.    No, not that I know of   Not selected:    Albuterol or a similar medication    Atropine    Corticosteroid (steroid) medication, including topical steroids, inhaled steroids, nasal steroids, or oral steroids (budesonide, ciclesonide, dexamethasone, flunisolide, fluticasone, methylprednisolone, triamcinolone, prednisone (or brand names Alvesco, Deltasone, Flovent, Medrol, Nasacort, Rhinocort, or Veramyst)    Metoclopramide (Reglan)    Ondansetron (Zuplenz, Zofran ODT, Zofran)    Prochlorperazine (Compazine)   Have you had an allergic or bad reaction to any of these eye drops, nasal sprays, or inhalers? Scroll to see all options. Select all that apply.    No, not that I know of   Not selected:    Azelastine (Astelin, Astepro, Optivar)    Cromolyn (Crolom, NasalCrom)    Ipratropium (Atrovent)    Ketotifen (Alaway, Zaditor)   "  Pheniramine/naphazoline (Naphcon-A, Opcon-A, Visine-A)    Olopatadine (Pataday, Patanol, Pazeo)   Have you had an allergic or bad reaction to any of these non-prescription medications? Scroll to see all options. Select all that apply.    No, not that I know of   Not selected:    Acetaminophen (Tylenol)    Aspirin    Cetirizine (Zyrtec)    Dextromethorphan (Delsym, Robitussin, Vicks DayQuil Cough)    Diphenhydramine (Benadryl)    Fexofenadine (Allegra)    Guaifenesin (Mucinex)    Dextromethorphan (Delsym)    Ibuprofen (Advil, Motrin, Midol)    Loratadine (Alavert, Claritin)    Oxymetazoline (Afrin)    Phenylephrine (Sudafed PE)    Pseudoephedrine (Sudafed)   Are you allergic to milk or to the proteins found in milk (for example, whey or casein)? A milk allergy is different from lactose intolerance. Select one.    No, not that I know of   Not selected:    Yes   Have you ever had jaundice or liver problems as a result of taking amoxicillin-clavulanate (Augmentin)? Jaundice is a condition in which the skin and the whites of the eyes turn yellow. Select all that apply.    No, not that I know of   Not selected:    Yes, jaundice    Yes, liver problems   Have you ever had jaundice or liver problems as a result of taking azithromycin (Zithromax, Zmax)? Jaundice is a condition in which the skin and the whites of the eyes turn yellow. Select all that apply.    No, not that I know of   Not selected:    Yes, jaundice    Yes, liver problems   Do you need a doctor's note? A doctor's note confirms that you received care today and states when you can return to school or work. It does not contain information about your diagnosis or treatment plan. Your provider will make the final decision on whether to give you a doctor's note and for how long. Doctor's notes CANNOT be backdated. We can't provide medical leave paperwork through this type of visit. If more paperwork is needed to request time off, contact your primary care provider.  Select one.    Today only (1 day)   Not selected:    Today and tomorrow (2 days)    3 days    5 days    7 days    10 days    14 days    No   Is there anything you'd like to add about your symptoms? Please limit your comments to the symptoms asked about in this interview. If you include comments about other concerns, your provider may recommend that you be seen in person.    Ear pain and pressure is adding to my headache I believe   ----------   Medical history   Medical history data does not currently exist for this patient.

## 2023-10-11 ENCOUNTER — E-VISIT (OUTPATIENT)
Dept: FAMILY MEDICINE CLINIC | Facility: TELEHEALTH | Age: 33
End: 2023-10-11
Payer: COMMERCIAL

## 2023-10-11 RX ORDER — BROMPHENIRAMINE MALEATE, PSEUDOEPHEDRINE HYDROCHLORIDE, AND DEXTROMETHORPHAN HYDROBROMIDE 2; 30; 10 MG/5ML; MG/5ML; MG/5ML
10 SYRUP ORAL 4 TIMES DAILY PRN
Qty: 280 ML | Refills: 0 | Status: SHIPPED | OUTPATIENT
Start: 2023-10-11

## 2023-10-11 RX ORDER — PREDNISONE 10 MG/1
TABLET ORAL DAILY
Qty: 21 EACH | Refills: 0 | Status: SHIPPED | OUTPATIENT
Start: 2023-10-11 | End: 2023-10-17

## 2023-10-11 NOTE — EXTERNAL PATIENT INSTRUCTIONS
View Doctor's Note     Note   I have prescribed you the following: Prednisone- this is a steroid for inflammation and pain. Bromfed- this is a combo cough syrup that will also treat congestion, and allergy symptoms. Recommend taking COVID test to rule out due to your other respiratory symptoms.   Diagnosis   Eustachian tube dysfunction   My name is SaigeJOE Conde. I'm a healthcare provider at [facility].   I've reviewed your interview, and I believe your symptoms are caused by eustachian tube dysfunction (ETD), not a bacterial infection. ETD usually isn't serious, and it generally gets better on its own with time.   I've given you a doctor's note for 1 day.   About your diagnosis   The middle ear is connected to the nose and throat by the eustachian tube. The eustachian tube keeps the air pressure inside the middle ear equal to the air pressure outside the body. Sometimes the eustachian tube gets swollen or blocked, such as during a cold, allergies, or sinus infection. When this happens, the eustachian tube can't keep the pressure equal. This difference in pressure can cause pain and a blocked or plugged feeling in the ear.   Key things to know about ETD:    You mentioned that you've recently had nasal congestion or other cold-like symptoms. Nasal congestion is a very common cause of eustachian tube dysfunction.   What to expect   If you follow the advice in this treatment plan, you should feel better within 1 to 2 days.   When to seek care   Call us at [phone]   with any sudden or unexpected symptoms.    Pain that doesn't start to improve within a few days.    Hearing loss that worsens or doesn't improve.    New or bloody drainage from your ear.    You mentioned that the bony part behind your ear is painful. If you develop a fever, or if this pain becomes worse, contact us immediately!    A fever that's over 103F or continues for more than 4 days.    Sudden difficulty standing up or walking.    Other treatment   Some simple self-care steps can open your eustachian tube(s) and make your ear pressure equal again. Try chewing gum, sucking on candy, exhaling with your mouth closed and your nose pinched shut, or yawning.   Smoke or air pollution can cause eustachian tube dysfunction or make it worse. Try to avoid exposure to tobacco smoke, smoke from a fire or stove, and air pollution while you heal.   Prevention   Avoid air travel when you have symptoms of a cold, sinus infection, or allergies. If you must travel, take a non-prescription nasal decongestant or antihistamine before takeoff. This can also be helpful for other altitude changes, such as hiking or driving in the mountains. Don't take decongestants before scuba diving.   Your provider   Your diagnosis was provided by JOE Thorpe, a member of your trusted care team at [facility].   If you have any questions, call us at [phone]  .   View Doctor's Note     Expires on 11/10/23

## 2023-10-11 NOTE — E-VISIT TREATED
Chief Complaint: Ear pain   Patient introduction   Patient is 33-year-old female who has an ache, a sensation of fullness, and pressure in both ears.   General presentation   Patient first noticed symptoms 2 to 3 days ago. They came on gradually. Symptoms come and go. Patient rates their pain as moderate (4 to 6/10). No fever.   No symptoms of tinnitus. No hearing loss. No drainage.   No history of PE tubes.   Patient's outer ear is warm to the touch.   Patient also has:    Respiratory symptoms, including stuffy nose and sinus pain or pressure.    Nasal/sinus symptoms for less than 48 hours. Patient's nasal/sinus symptoms have improved.    Headache described as severe.    Mild dizziness that does not affect patient's ability to stand or walk.   Patient regularly uses cotton swabs.   No recent airplane travel, scuba diving, hiking or driving in the mountains, or exposure to a loud blast or explosion. No swimming or water in ears in the last few weeks. No recent exposure to tobacco smoke, smoke from a fire or wood-burning stove, or air pollution.   Patient has used the following OTC medication(s) for their ear symptoms: ibuprofen and loratadine.   Has taken oral antibiotics for an ear infection 1 time in the past 12 months. Last antibiotic treatment for an ear infection was 4 months to a year ago.   Review of alarm symptoms/red flags:    No difficulty moving their chin toward their chest    No current treatment by ENT    No current PE tubes in affected ear(s)    No mastoid or ear surgery in the last 5 years    No sharp or pointed object in ear canal    No foreign body in ear    No recent head trauma    No vision changes    No symptoms suggesting mastoiditis    No severe dizziness or vertigo    No hearing loss in both ears   Pregnancy/menstrual status/breastfeeding:   Not pregnant. Not breastfeeding. Regarding date of last menstrual period, patient writes: Aug. 25th but have PCOS so they can be irregular..    Self-exam:    No difficulty moving their chin toward their chest    Symptoms including mastoid pain or tenderness and mastoid warmth    Pain is unchanged by chewing or moving the jaw    Pain is unaffected by manipulation of the pinna and/or pressure on the tragus    Pain is unchanged when lying down   Current medications   Currently taking hydrOXYzine pamoate 25 MG capsule, levocetirizine 5 MG tablet, albuterol 1.25 MG/3ML nebulizer solution, spironolactone 50 MG tablet, budesonide-formoterol 160-4.5 MCG/ACT inhaler, lamoTRIgine 25 MG tablet, and albuterol sulfate  (90 Base) MCG/ACT inhaler.   Medication allergies    Cephalosporins (reaction: peeling skin and breathing difficulties)   Medication contraindication review   No history of arrhythmia, congestive heart failure, recent myocardial infarction, heart block, heart disease, hypertension, hyperthyroidism, mononucleosis, or myasthenia gravis.   No known history of amoxicillin-clavulanate-associated jaundice or hepatic impairment.   No known history of azithromycin-associated cholestatic jaundice or hepatic impairment.   No history of aspirin triad; NSAID-induced asthma/urticaria; bowel obstruction; coronary artery disease; coagulation disorder; urinary retention; electrolyte abnormalities; G6PD deficiency; GI bleeding; hypertension; influenza, varicella, or febrile viral infection; or CABG surgery.   Past medical history   Immune conditions:   Regarding a condition they have that requires treatment with long-term use of oral steroids, patient writes: Asthma. Patient takes steriods regularly for their condition(s).   No history of cancer.   Patient requests a 1-day excuse note.   Assessment   Eustachian tube dysfunction.   This is the likely diagnosis based on patient's interview responses, including:    Symptom profile    Recent cold or allergy symptoms    Intermittent symptoms   Plan   Medications:   No medications prescribed.   Other:   Patient was given  an excuse note for 1 day.   Education:    Condition and causes    Prevention    Treatment and self-care    When to call provider   ----------   Electronically signed by JOE Thorpe on 2023-10-11 at 11:37AM   ----------   Patient Interview Transcript:   How would you describe your ear pain or discomfort? Select all that apply.    Achy    Full    Pressure   Not selected:    Itchy    Blocked or plugged    Crackling or popping    Throbbing    Shooting/stabbing/sharp   On a scale of 1 to 10, how severe is your ear pain? Select one.    Moderate (4 to 6); it's pretty uncomfortable   Not selected:    Mild (1 to 3); it bothers me a little bit    Moderate to severe (7 to 9); it's intense    Very severe; it's unbearable (10+)   Which ear is affected? Select one.    Both of my ears   Not selected:    My left ear    My right ear   When did you first notice this ear pain or discomfort? Select one.    2 to 3 days ago   Not selected:    Today    Yesterday    4 to 5 days ago    More than 5 days ago   Did your ear pain or discomfort come on suddenly or over time? Select one.    Over time   Not selected:    Suddenly   Is the outside of the affected ear red, swollen, warm to the touch, or painful to the touch? Select all that apply.    Warm to the touch   Not selected:    Red    Swollen    Painful to the touch    None of these   Is your ear pain or discomfort always there, or does it come and go? Select one.    Comes and goes   Not selected:    Always there   Does the pain or discomfort get worse when you bite or chew? Select one.    No   Not selected:    Yes   Along with your ear symptoms, have you had a fever or felt hot? Select one.    No   Not selected:    Yes   Do you have any of these on the same side as your affected ear?    Pain or tenderness over the bone behind your ear    Warmth over the bone behind your ear   Not selected:    Redness over the bone behind your ear    None of the above   Do your symptoms include the  sudden onset of ringing in one or both ears? Select one.    No   Not selected:    Yes   Do your symptoms include hearing loss? Select one.    No   Not selected:    Yes, I can't hear as well as I usually do    Yes, I can't hear at all in either ear   Has there been fluid draining out of either ear? Select one.    No   Not selected:    Yes, but no more than usual    Yes, and this is new or more than the usual amount   Along with your ear symptoms, have you had any of these cold symptoms? Select all that apply.    Stuffy nose (nasal congestion)    Sinus pain or pressure   Not selected:    Runny nose    Postnasal drip    Cough    Scratchy or sore throat    None of the above   How long have you had these nasal or sinus symptoms? Select one.    Less than 48 hours   Not selected:    3 to 5 days    6 to 9 days    10 to 14 days    2 to 4 weeks    1 month or longer   Have your nasal or sinus symptoms improved at all since they began? Select one.    Yes, but they haven't gone away completely   Not selected:    Yes, but then they came back worse than before    No   Along with your ear symptoms, do you have any of these throat or digestion symptoms? Select all that apply.    None of these   Not selected:    Burning feeling in your chest (heartburn)    Swallowed food or liquids getting stuck and coming back up    Feeling like there's a lump in your throat    Hoarse voice    Difficulty swallowing   Along with your ear symptoms, have you had a headache? Select one.    Yes   Not selected:    No   How would you describe your headache? Select one.    Severe   Not selected:    Mild to moderate    Unbearable    The worst headache of my life   Have you had any of these vision changes? Select all that apply.    None of these   Not selected:    Blurry vision    Double vision    I can't see at all from one or both eyes   Along with your ear symptoms, have you felt dizzy or had vertigo? This includes feeling like you're spinning, swaying, or  tilting; feeling light-headed; or feeling like the room is moving around you. Select one.    Yes   Not selected:    No   Has your dizziness or vertigo been so severe that you can't walk without assistance? Select one.    No   Not selected:    Yes   Do your symptoms include vomiting? Select one.    No   Not selected:    Yes   Can you move your chin toward your chest? Select one.    Yes   Not selected:    No, my neck is too stiff   Does your ear pain or discomfort get worse if you do either of these: 1. Pull the cartilage part of your ear up and back 2. Push on your tragus (the flesh in front of your ear opening)    No   Not selected:    Yes   Take a moment and lie down. Does your ear pain or discomfort feel worse when you lie down? Select one.    No   Not selected:    Yes   Right before your symptoms started, did you put anything sharp or pointed into your ear canal? Select one.    No   Not selected:    Yes   Is it possible that you have something stuck in your ear canal? Examples include a bead, button, rock, or part of an earbud. Select one.    No   Not selected:    Yes   Right before your ear pain began, did you have a severe head or ear injury? Examples include: - A blow to your head or ear - Hitting your head or ear on a hard surface - Falling on your ear while skateboarding or skiing - A motor vehicle accident - A sports injury, such as in wrestling - Penetrating trauma, such as a knife wound Select one.    No   Not selected:    Yes   In the week before your symptoms started, did any of these apply to you? Select all that apply.    None of the above   Not selected:    Air travel    Scuba diving    Hiking or driving in the mountains    Exposed to a loud blast or explosion   In the few weeks before your symptoms started, did you go swimming or get water in one or both ears? Select one.    No   Not selected:    Yes   Have you recently been exposed to more smoke or air pollution than usual? Select all that apply.     None of the above   Not selected:    Yes, tobacco smoke    Yes, smoke from a fire or wood-burning stove    Yes, air pollution   Do you regularly use any of these items? Select all that apply.    Cotton swabs to clean your ears   Not selected:    Hearing aids    Earbuds or in-ear headphones    Swim caps    Earwax removal devices, such as an irrigation kit    None of the above   Are you being treated by an Ear, Nose and Throat (ENT) doctor for an ear condition? An Ear, Nose, and Throat doctor is also known as an otolaryngologist. Select one.    No   Not selected:    Yes   Do you have ear tubes? Some other names for ear tubes are tympanostomy tubes, ventilation tubes, or pressure equalization (PE) tubes. Select one.    No   Not selected:    Yes, in my left ear only    Yes, in my right ear only    Yes, in both ears    No, but I've had them in the past   In the past 5 years, have you had mastoid surgery or ear surgery (not including ear tube placement or removal)? Select one.    No   Not selected:    Yes   When was the last time you were treated with antibiotics for an ear infection? This includes both oral antibiotics and antibiotic ear drops. Select one.    4 months to a year ago   Not selected:    Never    Within the last month    1 to 3 months ago    More than a year ago    I'm not sure   In the past year, how many times have you taken oral antibiotics for an ear infection? Select one.    1   Not selected:    2 or more    I'm not sure   Do you have any of these conditions that can affect the immune system? Scroll to see all options. Select all that apply.    A condition requiring treatment with long-term use of oral steroids (such as prednisone, prednisolone, or dexamethasone) (specify): Asthma   Not selected:    History of bone marrow transplant    Chronic kidney disease    Chronic liver disease (including cirrhosis)    HIV/AIDS    Inflammatory bowel disease (Crohn's disease or ulcerative colitis)    Lupus     Moderate to severe plaque psoriasis    Multiple sclerosis    Rheumatoid arthritis    Sickle cell anemia    Alpha or beta thalassemia    History of solid organ transplant (kidney, liver, or heart)    History of spleen removal    An autoimmune disorder not listed here (specify)    None of these   How often are you taking steroids? Select one.    Regularly (daily, weekly, or monthly)   Not selected:    For flare-ups only    Regularly AND at higher doses or more often when I have flare-ups   Have you ever been diagnosed with cancer? Select one.    No   Not selected:    Yes, I have cancer now    Yes, but I'm in remission   Are you pregnant? Select one.    No   Not selected:    Yes   When was your last menstrual period? If you don't currently have periods or no longer have periods, please briefly explain.    Aug. 25th but have PCOS so they can be irregular.   Are you breastfeeding? Select one.    No   Not selected:    Yes   The next few questions help us recommend the best treatment for you. Have you used any of these non-prescription medications for your ear symptoms? Select all that apply.    Ibuprofen (Advil, Motrin, Midol)    Loratadine (Alavert, Claritin)   Not selected:    Acetaminophen (Tylenol)    Carbamide peroxide otic (Auro, Debrox)    Cetirizine (Zyrtec)    Diphenhydramine (Benadryl)    Fexofenadine (Allegra)    Fluticasone (Flonase)    Naproxen (Aleve)    Isopropyl alcohol in glycerin ear drops (Swim Ear drops)    Oxymetazoline (Afrin)    Phenylephrine (Sudafed PE)    Pseudoephedrine (Sudafed)    Triamcinolone (Nasacort)    None of these   Are you still taking these medications listed in your medical record? If you're not taking any of these, click Next. Select all that apply.    hydrOXYzine pamoate 25 MG capsule    levocetirizine 5 MG tablet    albuterol 1.25 MG/3ML nebulizer solution    spironolactone 50 MG tablet    budesonide-formoterol 160-4.5 MCG/ACT inhaler    lamoTRIgine 25 MG tablet    albuterol  "sulfate  (90 Base) MCG/ACT inhaler   Are you taking any other medications, vitamins, or supplements? Select one.    No   Not selected:    Yes   Have you ever had an allergic or bad reaction to any medication? Select one.    Yes   Not selected:    No   Have you had an allergic or bad reaction to any of these medications? Select all that apply.    Cephalexin, cefazolin, cefdinir, cefuroxime, ceftriaxone, ceftazidime, or cefepime (Ancef, Ceftin, Fortaz, Keflex, Maxipime, Rocephin)   Not selected:    Corticosteroid medications, such as betamethasone, budesonide, dexamethasone, fluticasone, flunisolide, hydrocortisone, methylprednisolone, mometasone, prednisone, or prednisolone, triamcinolone (AeroBid, Advair, Aerospan, Asmanex, Flovent, Flonase, Nasocort, Pulmicort)    Cyclobenzaprine (Flexeril)    Ciprofloxacin, levofloxacin, moxifloxacin, or ofloxacin (Cipro, Floxin, Levaquin)    Azithromycin, clarithromycin, or erythromycin (Biaxin, Erythrocin, Pediazole, Zithromax)    Naproxen (Aleve)    Penicillin, amoxicillin, ampicillin, dicloxacillin, nafcillin, or piperacillin (Augmentin, Unasyn, Zosyn)    Doxycycline, minocycline, or tetracycline (Declomycin, Dynacin, Panmycin)    Fluconazole, itraconazole, or terconazole (Diflucan, Sporanox, Terazol)    None of these   When you had an allergic or bad reaction to cephalexin or another \"cef-\" antibiotic, did you have any of these symptoms? Select all that apply.    Peeling skin    Breathing difficulties   Not selected:    Raised, red, itchy spots with each spot lasting less than 24 hours    Swelling of the mouth, eyes, lips, or tongue    Blisters or ulcers on the lips, mouth, eyes, or vagina    Feeling light-headed or faint    A fast heartbeat    Clammy skin    Confusion and anxiety    Collapsing or losing consciousness    Joint pains    Problems with kidneys, lungs, or liver    None of the above   Have you had an allergic or bad reaction to benzonatate (Tessalon " Perles)? Select one.    No   Not selected:    Yes   Have you had an allergic or bad reaction to any of these non-prescription medications? Scroll to see all options. Select all that apply.    None of these   Not selected:    Acetaminophen (Tylenol)    Aspirin    Carbamide peroxide (Auro, Debrox)    Cetirizine (Zyrtec)    Chlorpheniramine (Aller-chlor, Chlor-Trimeton)    Dextromethorphan (Delsym, Robitussin 12 Hour Cough Relief)    Diphenhydramine (Benadryl)    Fexofenadine (Allegra)    Guaifenesin (Mucinex)    Guaifenesin/dextromethorphan (Mucinex DM, Robitussin DM)    Ibuprofen (Advil, Motrin, Midol)    Isopropyl alcohol in glycerin ear drops (Swim Ear drops)    Loratadine (Alavert, Claritin)    Omeprazole (Prilosec)    Oxymetazoline (Afrin, Zicam Extreme Congestion Relief)    Pantoprazole (Protonix)    Phenylephrine (Suphedrine PE, Sudafed PE, Wal-phed PE)    Pseudoephedrine (Sudafed, SudoGest, Wal-Phed D)   Are you currently being treated for any of these conditions? Select all that apply.    None of the above   Not selected:    Arrhythmia    Congestive heart failure    Recent heart attack    Heart block    Blockage or narrowing of the blood vessels of the heart    Hypertension    Hyperthyroidism    Mononucleosis    Myasthenia gravis   Amoxicillin-clavulanate (Augmentin)    No   Not selected:    Jaundice    Liver problems   Azithromycin (Zithromax, Zmax)    No   Not selected:    Jaundice    Liver problems   Do you have any of these conditions? Select all that apply.    None of the above   Not selected:    Aspirin triad (also known as Samter's triad or ASA triad)    Asthma or hives from taking aspirin or other NSAIDs, such as ibuprofen or naproxen    Bowel obstruction    Coagulation disorder    Difficulty urinating or completely emptying your bladder    Electrolyte abnormalities    G6PD deficiency    Gastrointestinal (GI) bleeding    Influenza, chickenpox, or a viral infection with fever    Recent coronary artery  bypass graft (CABG) surgery   Have you taken any monoamine oxidase inhibitor (MAOI) medications within the last 14 days? Examples include rasagiline (Azilect), selegiline (Eldepryl, Zelapar), isocarboxazid (Marplan), phenelzine (Nardil), and tranylcypromine (Parnate). Select one.    No   Not selected:    Yes   Do you need a doctor's note? A doctor's note confirms that you received care today and states when you can return to school or work. It does not contain information about your diagnosis or treatment plan. Your provider will make the final decision on whether to give you a doctor's note. Doctor's notes CANNOT be backdated. Select one.    Today only (1 day)   Not selected:    Today and tomorrow (2 days)    3 days    No   Is there anything you'd like to add about your symptoms? Please limit your comments to the symptoms asked about in this interview. If you include comments about other concerns, your provider may recommend that you be seen in person.   The patient did not enter any additional information.   ----------   Medical history   The following information was received from the EMR on October 11, 2023.   Allergies:    CEFUROXIME AXETIL   - Allergy Type: Medication   - Reaction: Other (See Comments)   - Severity: High   - Clinical Status: Active   - Verification Status: Confirmed    AMOXICILLIN-POT CLAVULANATE   - Allergy Type: Medication   - Reaction: Nausea And Vomiting   - Severity: High   - Clinical Status: Active   - Verification Status: Confirmed    CEFACLOR   - Allergy Type: Medication   - Reaction: Other (See Comments)   - Severity: High   - Clinical Status: Active   - Verification Status: Confirmed    CEFIXIME   - Allergy Type: Medication   - Reaction: Nausea And Vomiting, Shortness Of Breath   - Severity: High   - Clinical Status: Active   - Verification Status: Confirmed    CEFPROZIL   - Allergy Type: Medication   - Reaction: Shortness Of Breath   - Severity: High   - Clinical Status: Active    - Verification Status: Confirmed    CEPHALEXIN   - Allergy Type: Medication   - Reaction: Shortness Of Breath   - Severity: High   - Clinical Status: Active   - Verification Status: Confirmed    CEPHALOSPORINS   - Allergy Type: Medication   - Reaction: Shortness Of Breath   - Severity: High   - Clinical Status: Active   - Verification Status: Confirmed    LATEX   - Allergy Type: Medication   - Reaction: Shortness Of Breath   - Severity: High   - Clinical Status: Active   - Verification Status: Confirmed    PROMETHAZINE HCL   - Allergy Type: Medication   - Reaction: Other (See Comments)   - Severity: High   - Clinical Status: Active   - Verification Status: Confirmed    LAMICTAL [LAMOTRIGINE]   - Allergy Type: Medication   - Reaction: Rash   - Severity: High   - Clinical Status: Active   - Verification Status: Confirmed    OTHER   - Allergy Type:   - Reaction: Other (See Comments)   - Severity: High   - Clinical Status: Active   - Verification Status: Confirmed   Medications:    fluticasone (FLONASE) nasal spray   - Route: Nasal   - Start Date: August 24, 2022   - End Date: None   - Status: Active    hydrOXYzine pamoate (VISTARIL) capsule   - Route:   - Start Date: March 20, 2023   - End Date: None   - Status: Active    levocetirizine (XYZAL) tablet 5 mg   - Route: Oral   - Start Date: February 17, 2020   - End Date: None   - Status: Active    albuterol (PROVENTIL) nebulizer solution 0.042% 1.25 mg/3mL   - Route: Nebulization   - Start Date: February 17, 2021   - End Date: None   - Status: Active    spironolactone (ALDACTONE) tablet   - Route: Oral   - Start Date: March 20, 2023   - End Date: None   - Status: Active    budesonide-formoterol (SYMBICORT) inhaler 160-4.5 mcg/puff   - Route: Inhalation   - Start Date: March 20, 2023   - End Date: None   - Status: Active    cariprazine (VRAYLAR) capsule   - Route: Oral   - Start Date: July 05, 2022   - End Date: None   - Status: Active    lamoTRIgine (laMICtal) tablet    - Route: Oral   - Start Date: September 06, 2022   - End Date: None   - Status: Active    albuterol (PROVENTIL HFA;VENTOLIN HFA;PROAIR HFA) inhaler   - Route: Inhalation   - Start Date: March 20, 2023   - End Date: None   - Status: Active   Problem list:    Abnormal weight gain   - Category: Problem List Item   - Health Status:   - Start Date: February 04, 2021   - End Date: None   - Status: Inactive    Allergy   - Category: Problem List Item   - Health Status:   - Start Date: February 04, 2021   - End Date: None   - Status: Active    Anemia   - Category: Problem List Item   - Health Status:   - Start Date: February 04, 2021   - End Date: None   - Status: Active    Anovulation   - Category: Problem List Item   - Health Status:   - Start Date: February 04, 2021   - End Date: None   - Status: Active    Asthma exacerbation   - Category: Problem List Item   - Health Status:   - Start Date: February 04, 2021   - End Date: November 30, 2021   - Status: Resolved    Moderate persistent asthma without complication   - Category: Problem List Item   - Health Status:   - Start Date: February 04, 2021   - End Date: None   - Status: Active    Body mass index (BMI) of 50-59.9 in adult   - Category: Problem List Item   - Health Status:   - Start Date: February 04, 2021   - End Date: None   - Status: Active    Chronic pulmonary embolism   - Category: Problem List Item   - Health Status:   - Start Date: February 04, 2021   - End Date: None   - Status: Active    Diarrhea   - Category: Problem List Item   - Health Status:   - Start Date: February 04, 2021   - End Date: November 30, 2021   - Status: Resolved    DUB (dysfunctional uterine bleeding)   - Category: Problem List Item   - Health Status:   - Start Date: February 04, 2021   - End Date: None   - Status: Active    Dysphagia   - Category: Problem List Item   - Health Status:   - Start Date: February 04, 2021   - End Date: November 30, 2021   - Status: Resolved    Fertility  testing   - Category: Problem List Item   - Health Status:   - Start Date: February 04, 2021   - End Date: None   - Status: Active    Encounter for screening for other disorder   - Category: Problem List Item   - Health Status:   - Start Date: February 04, 2021   - End Date: None   - Status: Inactive    Headache, migraine   - Category: Problem List Item   - Health Status:   - Start Date: February 04, 2021   - End Date: None   - Status: Active    Memory loss   - Category: Problem List Item   - Health Status:   - Start Date: February 04, 2021   - End Date: None   - Status: Active    Bipolar depression   - Category: Problem List Item   - Health Status:   - Start Date: February 04, 2021   - End Date: None   - Status: Active    MRSA infection   - Category: Problem List Item   - Health Status:   - Start Date: February 04, 2021   - End Date: None   - Status: Active    Nausea and vomiting   - Category: Problem List Item   - Health Status:   - Start Date: February 04, 2021   - End Date: November 30, 2021   - Status: Resolved    Neck pain   - Category: Problem List Item   - Health Status:   - Start Date: February 04, 2021   - End Date: None   - Status: Active    Morbid (severe) obesity due to excess calories   - Category: Problem List Item   - Health Status:   - Start Date: February 04, 2021   - End Date: None   - Status: Active    Other specified sites of sprains and strains   - Category: Problem List Item   - Health Status:   - Start Date: February 04, 2021   - End Date: None   - Status: Active    Snoring   - Category: Problem List Item   - Health Status:   - Start Date: February 04, 2021   - End Date: None   - Status: Active    Tinea pedis   - Category: Problem List Item   - Health Status:   - Start Date: February 04, 2021   - End Date: None   - Status: Active    Urinary tract infection   - Category: Problem List Item   - Health Status:   - Start Date: February 04, 2021   - End Date: November 30, 2021   - Status:  Resolved    PCOS (polycystic ovarian syndrome)   - Category: Problem List Item   - Health Status:   - Start Date: November 30, 2021   - End Date: None   - Status: Active    Exposure to COVID-19 virus   - Category: Problem List Item   - Health Status:   - Start Date: January 02, 2022   - End Date: None   - Status: Active    Bacterial sinusitis   - Category: Problem List Item   - Health Status:   - Start Date: August 24, 2022   - End Date: None   - Status: Active

## 2023-10-18 ENCOUNTER — E-VISIT (OUTPATIENT)
Dept: FAMILY MEDICINE CLINIC | Facility: TELEHEALTH | Age: 33
End: 2023-10-18
Payer: COMMERCIAL

## 2023-10-18 ENCOUNTER — TELEMEDICINE (OUTPATIENT)
Dept: FAMILY MEDICINE CLINIC | Facility: TELEHEALTH | Age: 33
End: 2023-10-18
Payer: COMMERCIAL

## 2023-10-18 DIAGNOSIS — K52.9 ACUTE GASTROENTERITIS: Primary | ICD-10-CM

## 2023-10-18 RX ORDER — ONDANSETRON 4 MG/1
4 TABLET, ORALLY DISINTEGRATING ORAL EVERY 8 HOURS PRN
Qty: 10 TABLET | Refills: 0 | Status: SHIPPED | OUTPATIENT
Start: 2023-10-18

## 2023-10-18 NOTE — PROGRESS NOTES
CHIEF COMPLAINT  Chief Complaint   Patient presents with    GI Problem         HPI  Karma Steen is a 33 y.o. female  presents with complaint of nausea, vomiting and diarrhea that started yesterday afternoon. She is no longer vomiting, but continues to have nausea and diarrhea.   Vomiting is of normal stomach contents and diarrhea is water.   She started getting sick after eating yesterday, so assumes it is food poisoning.   She needs a work note.     Review of Systems   Constitutional:  Positive for fatigue. Negative for chills, diaphoresis and fever.   HENT:  Negative for congestion, rhinorrhea, sinus pressure, sinus pain, sneezing and sore throat.    Respiratory:  Negative for cough.    Gastrointestinal:  Positive for abdominal pain (cramping with diarrhea), diarrhea, nausea and vomiting. Negative for abdominal distention, anal bleeding, blood in stool, constipation and rectal pain.       Past Medical History:   Diagnosis Date    Anxiety     Asthma     Exposure to COVID-19 virus 01/02/2022    Migraine     MRSA (methicillin resistant Staphylococcus aureus)     PCOS (polycystic ovarian syndrome)     PE (pulmonary thromboembolism) 2019    Staph infection     Tachycardia        Family History   Problem Relation Age of Onset    Heart disease Mother     Migraines Mother     Heart disease Father     Breast cancer Maternal Aunt     Diabetes Paternal Grandmother     Hypertension Paternal Grandmother        Social History     Socioeconomic History    Marital status:    Tobacco Use    Smoking status: Never     Passive exposure: Past    Smokeless tobacco: Never   Vaping Use    Vaping Use: Never used   Substance and Sexual Activity    Alcohol use: Yes     Comment: very rare    Drug use: Yes     Types: Marijuana    Sexual activity: Yes     Partners: Male       Karma Steen  reports that she has never smoked. She has been exposed to tobacco smoke. She has never used smokeless tobacco.         LMP 09/28/2023  (Approximate)   Breastfeeding No     PHYSICAL EXAM  Physical Exam   Constitutional: She is oriented to person, place, and time. She appears well-developed and well-nourished. She does not have a sickly appearance. She does not appear ill. No distress.   HENT:   Head: Normocephalic and atraumatic.   Eyes: EOM are normal.   Neck: Neck normal appearance.  Pulmonary/Chest: Effort normal.  No respiratory distress.  Neurological: She is alert and oriented to person, place, and time.   Skin: Skin is dry.   Psychiatric: She has a normal mood and affect.           Diagnoses and all orders for this visit:    1. Acute gastroenteritis (Primary)    Other orders  -     ondansetron ODT (ZOFRAN-ODT) 4 MG disintegrating tablet; Place 1 tablet on the tongue Every 8 (Eight) Hours As Needed for Nausea or Vomiting.  Dispense: 10 tablet; Refill: 0        The use of a video visit has been reviewed with the patient and verbal informed consent has been obtained. Myself and Karma Steen participated in this visit. The patient is located in [address] [place] [address] [place] [facility]. I am located in [place], [place]. [employer] and [employer] were utilized.       Note Disclaimer: At [facility], we believe that sharing information builds trust and better   relationships. You are receiving this note because you recently visited [facility]. It is possible you   will see health information before a provider has talked with you about it. This kind of information can   be easy to misunderstand. To help you fully understand what it means for your health, we urge you to   discuss this note with your provider.    Kaya Feliz, JOE  10/18/2023  12:02 EDT

## 2023-10-18 NOTE — LETTER
October 18, 2023     Patient: Karma Steen   YOB: 1990   Date of Visit: 10/18/2023       To Whom It May Concern:    It is my medical opinion that Karma Steen may return to work Friday 10/20/2023.       Sincerely,    Kaya Feliz [facility] Ph:[phone]      URGENT CARE VIDEO VISIT PROVIDER    CC: No Recipients

## 2023-10-18 NOTE — PATIENT INSTRUCTIONS
Drink plenty of water  Clear liquid diet until no longer vomiting. Stay away from fatty and fried foods and milk products until diarrhea resolves.   If symptoms do not improve in 3 days follow up with your primary care provider or urgent care  If you develop abdominal pain or more severe symptoms, go to the emergency department.        Viral Gastroenteritis, Adult  Viral gastroenteritis is also known as the stomach flu. This condition may affect your stomach, your small intestine, and your large intestine. It can cause sudden watery poop (diarrhea), fever, and vomiting. This condition is caused by certain germs (viruses). These germs can be passed from person to person very easily (are contagious).  Having watery poop and vomiting can make you feel weak and cause you to not have enough water in your body (get dehydrated). This can make you tired and thirsty, make you have a dry mouth, and make it so you pee (urinate) less often. It is important to replace the fluids that you lose from having watery poop and vomiting.  What are the causes?  You can get sick by catching germs from other people.  You can also get sick by:  Eating food, drinking water, or touching a surface that has the germs on it (is contaminated).  Sharing utensils or other personal items with a person who is sick.  What increases the risk?  Having a weak body defense system (immune system).  Living with one or more children who are younger than 2 years.  Living in a nursing home.  Going on cruise ships.  What are the signs or symptoms?  Symptoms of this condition start suddenly. Symptoms may last for a few days or for as long as a week.  Common symptoms include:  Watery poop.  Vomiting.  Other symptoms include:  Fever.  Headache.  Feeling tired (fatigue).  Pain in the belly (abdomen).  Chills.  Feeling weak.  Feeling like you may vomit (nauseous).  Muscle aches.  Not feeling hungry.  How is this treated?  This condition typically goes away on its  own. The focus of treatment is to replace the fluids that you lose. This condition may be treated with:  An ORS (oral rehydration solution). This is a drink that helps you replace fluids and minerals your body lost. It is sold at pharmacies and stores.  Medicines to help with your symptoms.  Probiotic supplements to reduce symptoms of watery poop.  Fluids given through an IV tube, if needed.  Older adults and people with other diseases or a weak body defense system are at higher risk for not having enough water in the body.  Follow these instructions at home:  Eating and drinking  Take an ORS as told by your doctor.  Drink clear fluids in small amounts as you are able. Clear fluids include:  Water.  Ice chips.  Fruit juice that has water added to it (is diluted).  Low-calorie sports drinks.  Drink enough fluid to keep your pee (urine) pale yellow.  Eat small amounts of healthy foods every 3-4 hours as you are able. This may include whole grains, fruits, vegetables, lean meats, and yogurt.  Avoid fluids that have a lot of sugar or caffeine in them. This includes energy drinks, sports drinks, and soda.  Avoid spicy or fatty foods.  Avoid alcohol.  General instructions  Wash your hands often. This is very important after you have watery poop or you vomit. If you cannot use soap and water, use hand .  Make sure that all people in your home wash their hands well and often.  Take over-the-counter and prescription medicines only as told by your doctor.  Rest at home while you get better.  Watch your condition for any changes.  Take a warm bath to help with any burning or pain from having watery poop.  Keep all follow-up visits.  Contact a doctor if:  You cannot keep fluids down.  Your symptoms get worse.  You have new symptoms.  You feel light-headed or dizzy.  You have muscle cramps.  Get help right away if:  You have chest pain.  You have trouble breathing, or you are breathing very fast.  You have a fast  heartbeat.  You feel very weak or you faint.  You have a very bad headache, a stiff neck, or both.  You have a rash.  You have very bad pain, cramping, or bloating in your belly.  Your skin feels cold and clammy.  You feel mixed up (confused).  You have pain when you pee.  You have signs of not having enough water in the body, such as:  Dark pee, hardly any pee, or no pee.  Cracked lips.  Dry mouth.  Sunken eyes.  Feeling very sleepy.  Feeling weak.  You have signs of bleeding, such as:  You see blood in your vomit.  Your vomit looks like coffee grounds.  You have bloody or black poop or poop that looks like tar.  These symptoms may be an emergency. Get help right away. Call 911.  Do not wait to see if the symptoms will go away.  Do not drive yourself to the hospital.  Summary  Viral gastroenteritis is also known as the stomach flu.  This condition can cause sudden watery poop (diarrhea), fever, and vomiting.  These germs can be passed from person to person very easily.  Take an ORS (oral rehydration solution) as told by your doctor. This is a drink that is sold at pharmacies and stores.  Wash your hands often, especially after having watery poop or vomiting. If you cannot use soap and water, use hand .  This information is not intended to replace advice given to you by your health care provider. Make sure you discuss any questions you have with your health care provider.  Document Revised: 10/17/2022 Document Reviewed: 10/17/2022  [employer]vier Patient Education © 2023 [employer]

## 2023-10-18 NOTE — E-VISIT ESCALATED
"   Patient escalated   Provider JOE Cuellar chose to escalate patient to another level of care because: Patient's free text comments   Patient was sent the following message:   Based on the information you've provided us, you need to take another step to get care. weakness   What to do now:   Urgent Care Video Visit   Get urgent care online with a video visit as soon as provider is available to see you.   [facility] offers video visits 24/7 for urgent care conditions. Urgent care video visits allow you to skip the waiting room and are available on-demand once a provider is ready to see you virtually. To get started or learn more, click \"Continue\".   Continue     You won't be charged for your eVisit. If you paid with a credit card, the charge will be reversed.   Chief Complaint: Diarrhea   Patient introduction   Patient is 33-year-old female presenting with diarrhea, abdominal pain, nausea, and vomiting that started within the last 24 hours.   Warning. The following may warrant further investigation:    Upon standing after sitting or lying down, patient has weakness.    Patient is at high risk for severe disease due to a condition that requires long-term use of oral steriods. Regarding the condition they have that requires long-term oral steroid use, patient writes: *Asthma*.   General presentation   In the last 24 hours, patient has vomited twice. Vomited material looks like partially digested food.   In the last 24 hours, patient has had diarrhea 3 to 5 times. Stools are described as frequent, large amounts of loose or watery stools. Patient rates diarrhea symptoms as moderate (symptoms interfere somewhat with daily activities).   Abdominal pain is located in the periumbilical region. Abdominal pain is described as cramping and is intermittent. Patient rates their abdominal pain as moderate (uncomfortable, but they can still do daily activities).   Patient also has malaise, loss of appetite, bloating, " and increased gas. No fever.   Patient has urinated at least once in the last 8 hours (or at least 3 times in the last 24 hours). Urine volume and color are normal.   Patient has tried bismuth subsalicylate for their current symptoms.   Review of red flags/alarm symptoms:    No confusion.    No slurred speech.    No disorientation.    No difficulty staying conscious or awake.    No syncope.    No cold, clammy, pale skin.    No rapid, shallow breathing.    No dark, tarry stools.    No vomiting of bright red blood.    No vomiting of coffee-ground-like material.   Travel history   No recent international travel.   Animal or food exposures:    Chickens, ducks, turkeys, or geese: No    Turtles: No    Animals at a petting zoo: No    Contaminated or untreated water sources: No    Raw or undercooked seafood or shellfish: No    Home-canned goods: No    Undercooked meat: No    Unpasteurized meat or juice: No    Deli meat or hot dogs: No    Soft cheese: No    P?t?: No    Raw produce not properly washed, or washed in contaminated water: No   Occupational or residential exposures:    : No    Childcare worker in a  setting: No    Nursing home or : No    Nursing home or group home resident: No    Lives with child who attends : No    Recent travel on cruise ship: No    Contacts with similar symptoms: No    Started a new medication or supplement before symptom onset: No   C. diff risk factors:    Oral antibiotic use in the past 8 to 12 weeks: No    Hospitalization for 3 or more days in the past 3 months: No   Sexual history:    Currently sexually active: Yes    Patient has sex with: Men   Risk factors for severe disease and complications:    Patient is at high risk for severe disease due to a condition that requires long-term use of oral steriods. Regarding the condition they have that requires long-term oral steroid use, patient writes: Asthma.   Pregnancy/menstrual status/breastfeeding:    Not pregnant. Not breastfeeding. Regarding date of last menstrual period, patient writes: On it now.   Self-exam:    Less than 2-second capillary refill    Regarding blood pressure reading when lying down, patient writes: 126/82.    Regarding blood pressure reading after standing for 2 to 5 minutes, patient writes: 122/84.   Past medical history   Immune conditions:   Regarding a condition they have that requires treatment with long-term use of oral steroids, patient writes: Asthma. Patient takes steriods regularly for their condition(s).   No history of cancer.   Current medications   Currently taking hydrOXYzine pamoate 25 MG capsule, albuterol 1.25 MG/3ML nebulizer solution, spironolactone 50 MG tablet, budesonide-formoterol 160-4.5 MCG/ACT inhaler, Cariprazine HCl 1.5 MG capsule capsule, lamoTRIgine 25 MG tablet, and albuterol sulfate  (90 Base) MCG/ACT inhaler.   Medication allergies   None.   Medication contraindication review   Patient-submitted comments   Patient was asked if they had anything to add about their symptoms. Patient writes: Started after eating fast food. Worried about food poisoning .   Patient requests a 1-day excuse note.   Assessment:   Patient determined to need a level of care not appropriate to be delivered through eVisit.   Plan:   Patient informed of need to seek in-person care   ----------   Electronically signed by JOE Cuellar on 2023-10-18 at 06:24AM   ----------   Patient Interview Transcript:   This interview can help you get care for diarrhea lasting 14 days or less. If you've had symptoms for longer than 14 days, you should speak with a provider to get care. When did your symptoms start? Select one.    Within the last 24 hours   Not selected:    1 to 2 days ago    2 to 3 days ago    3 to 5 days ago    5 to 7 days ago    7 to 10 days ago    10 to 14 days ago    More than 14 days ago   Which symptoms are you currently having? Select all that apply.    Diarrhea     Abdominal pain    Nausea    Vomiting   Not selected:    None of the above   In the last 24 hours, how many times have you vomited? Select one.    Twice   Not selected:    Once    3 times    4 or more times   How would you describe the vomited material? Select one.    Partially digested food   Not selected:    Undigested food    Bile (dark green to yellowish brown fluid)    Bright red blood    Material that looks like coffee grounds    Other (specify)   In the last 24 hours, how many times have you had diarrhea? Select one.    3 to 5 times   Not selected:    1 to 2 times    6 or more times   How would you describe your stools? Select one.    Frequent, large amounts of loose or watery stools   Not selected:    Large amounts of bloody diarrhea (stool mixed with blood)    Frequent, small amounts of stool with blood and/or mucus    Dark, tarry stool    Blood with very little stool    Other (specify)   How severe are your diarrhea symptoms? Select one.    Moderate; it's distressing or interferes somewhat with my daily activities   Not selected:    Mild; it's tolerable, not distressing, and doesn't interfere with my daily activities    Severe; it prevents me from doing all my daily activities   Is your abdominal pain always there, or does it come and go? Select one.    Comes and goes   Not selected:    Always there   Is your abdominal pain centered from left to right, or more on one side? Select one.    Centered   Not selected:    More on one side   Which of these areas best describes the location of your abdominal pain? Select one.    Middle center   Not selected:    Upper center    Lower center   Which of these best describes your abdominal pain? Select one.    Cramping   Not selected:    Dull ache    Sharp    Gnawing or burning    Other (specify)   On a scale of 1 to 10, how severe is your abdominal pain? Select one.    Moderate (4 to 6); it's uncomfortable, but I can still do my regular daily tasks   Not selected:     Mild (1 to 3); I only notice it when I pay attention to it    Severe (7 to 9); it stops me from doing my regular daily tasks    Unbearable (10+); it makes it hard to move or breathe   Have you had any of these symptoms? Select all that apply.    General feeling of discomfort or illness    Loss of appetite    Bloating    Increased gas   Not selected:    Fever    None of the above   The following symptoms may require emergency care. Since your symptoms started, have you had any of these? Select all that apply.    None of the above   Not selected:    Confusion    Slurred speech    Not knowing where you are or what day it is    Having a hard time staying conscious or awake    Fainting or passing out    Rapid, pounding, or irregular heartbeat    Cold, clammy, pale skin    Rapid shallow breathing   Have you urinated at least once in the last 8 hours (or at least 3 times in the last 24 hours)? Select one.    Yes   Not selected:    No   Do either of these accurately describe your urine? Select all that apply.    No   Not selected:    Dark yellow    Smaller amounts than usual   Please do a quick test for us: Press firmly on one of your fingernails until it turns white, then let go. How long does it take before the nail turns pink again?    Less than 2 seconds   Not selected:    More than 2 seconds    I'm not sure   Since your symptoms started, have you had any of these symptoms when you stand up after sitting or lying down? Select all that apply.    Weakness   Not selected:    Dizziness    Light-headedness    Leg buckling    Changes in vision, such as blurry vision or darkening of vision    Chest pain    None of the above   Having symptoms when you stand up may mean that you're dehydrated. This can affect your blood pressure. Checking your blood pressure can help your provider make the best treatment plan for you. Do you have a blood pressure monitor at home? Select one.    Yes   Not selected:    No   For the first reading:  - Lie down on your back and rest for 5 minutes - Check your blood pressure while still lying down Enter your blood pressure reading and heart rate reading.    126/82   For the second reading: - Stand up - Check your blood pressure again after you've been standing for 2 to 5 minutes Enter your blood pressure reading and heart rate reading.    122/84   Did your symptoms start while traveling internationally, or within 10 days of returning from international travel? Select one.    No   Not selected:    Yes   In the week before your symptoms started, were you exposed to any of these live animals? Select all that apply.    None of the above   Not selected:    Chickens, ducks, turkeys, or geese    Turtles    Animals at a [facility] zoo   Before your symptoms started, were you exposed to a contaminated or untreated water source? This includes water from rivers, streams, or lakes, as well as untreated water used to prepare drinks or wash uncooked food. Select one.    No, not that I know of   Not selected:    Yes   Before your symptoms started, did you eat any of these foods? Select all that apply.    No, not that I know of   Not selected:    Raw or undercooked seafood or shellfish    Home-canned goods    Undercooked meat, such as ground beef, poultry, pork, or pork products    Unpasteurized meat or juice    Deli meats or hot dogs    Soft cheese    P?té    Raw fruits or vegetables that weren't properly washed, or that were washed in untreated or contaminated water   Has anyone around you had similar symptoms? Select one.    No, not that I know of   Not selected:    Yes   Do any of these situations apply to you? Select all that apply.    None of the above   Not selected:    I work as a     I'm a childcare worker in a  setting    I work at a nursing home or group home setting   Do any of these situations apply to you? Select all that apply.    None of the above   Not selected:    I live in a nursing home or  group home setting    I live with a child who attends     I recently traveled on a cruise ship   In the 12 weeks before the diarrhea started, did you take oral antibiotics? Oral antibiotics are medications taken by mouth to treat a bacterial infection. Select one.    No   Not selected:    Yes   In the past 3 months, have you been hospitalized for 3 or more days? Select one.    No   Not selected:    Yes   Did you recently start taking a new medication or supplement? This includes prescription and non-prescription medications and supplements. Select one.    No   Not selected:    Yes   Are you currently sexually active? Select one.    Yes   Not selected:    No   Do you have sex with men, women, or both? Select one.    Men   Not selected:    Women    Both   Do you have any of these conditions? Select all that apply.    None of the above   Not selected:    Diabetes    Congestive heart failure    History of a heart attack (myocardial infarction)    Coronary artery disease, or narrowing of the blood vessels of the heart    Angina   Do you have any of these conditions that can affect the immune system? Scroll to see all options. Select all that apply.    A condition requiring treatment with long-term use of oral steroids (such as prednisone, prednisolone, or dexamethasone) (specify): Asthma   Not selected:    History of bone marrow transplant    Chronic kidney disease    Chronic liver disease (including cirrhosis)    HIV/AIDS    Inflammatory bowel disease (Crohn's disease or ulcerative colitis)    Lupus    Moderate to severe plaque psoriasis    Multiple sclerosis    Rheumatoid arthritis    Sickle cell anemia    Alpha or beta thalassemia    History of solid organ transplant (kidney, liver, or heart)    History of spleen removal    An autoimmune disorder not listed here (specify)    None of these   How often are you taking steroids? Select one.    Regularly (daily, weekly, or monthly)   Not selected:    For flare-ups  only    Regularly AND at higher doses or more often when I have flare-ups   Have you ever been diagnosed with cancer? Select one.    No   Not selected:    Yes, I have cancer now    Yes, but I'm in remission   Which non-prescription medications have you tried for your current symptoms? Select all that apply.    Bismuth subsalicylate (Pepto-Bismol, Kaopectate)   Not selected:    Loperamide (Imodium)    Simethicone (Gas-X)    Omeprazole (Prilosec OTC)    Famotidine (Pepcid AC)    Antacids such as Tums, Rolaids, or Maalox    Other (specify)    I haven't tried anything for my symptoms   Are you pregnant? Select one.    No   Not selected:    Yes   When was your last menstrual period? If you don't currently have periods or no longer have periods, please briefly explain.    On it now   Are you breastfeeding? Select one.    No   Not selected:    Yes   Are you still taking these medications listed in your medical record? If you're not taking any of these, click Next. Select all that apply.    hydrOXYzine pamoate 25 MG capsule    albuterol 1.25 MG/3ML nebulizer solution    spironolactone 50 MG tablet    budesonide-formoterol 160-4.5 MCG/ACT inhaler    Cariprazine HCl 1.5 MG capsule capsule    lamoTRIgine 25 MG tablet    albuterol sulfate  (90 Base) MCG/ACT inhaler   Are you taking any other medications, vitamins, or supplements? Select one.    No   Not selected:    Yes   Do you currently have any of these conditions?    None of the above   Not selected:    Blood clotting disorder    Blood dyscrasia, such as anemia, leukemia, lymphoma, or myeloma    Bone marrow depression    Chicken pox    Congenital long QT syndrome    Darren-Danlos syndrome    G6PD deficiency    High blood pressure    History of aortic aneurysm or dissection    Influenza (flu)    Marfan syndrome    Myasthenia gravis    Peripheral vascular disease    Reye syndrome    Severely impaired kidney function    Severely impaired liver function   Have you ever  had jaundice or liver problems from taking azithromycin?    No   Not selected:    Yes   Have you ever had an allergic or bad reaction to any medication? Select one.    No   Not selected:    Yes   Do you need a doctor's note? A doctor's note confirms that you received care today and states when you can return to school or work. It does not contain information about your diagnosis or treatment plan. Your provider will make the final decision on whether to give you a doctor's note and for how long. Doctor's notes CANNOT be backdated. We can't provide medical leave paperwork through this type of visit. If more paperwork is needed to request time off, contact your primary care provider. Select one.    Today only (1 day)   Not selected:    Today and tomorrow (2 days)    3 days    5 days    7 days    10 days    No   Is there anything you'd like to add about your symptoms? Please limit your comments to the symptoms asked about in this interview. If you include comments about other concerns, your provider may recommend that you be seen in person.    Started after eating fast food. Worried about food poisoning   ----------   Medical history   Medical history data does not currently exist for this patient.

## 2023-11-04 ENCOUNTER — E-VISIT (OUTPATIENT)
Dept: FAMILY MEDICINE CLINIC | Facility: TELEHEALTH | Age: 33
End: 2023-11-04

## 2023-11-04 PROCEDURE — BRIGHTMDVISIT: Performed by: NURSE PRACTITIONER

## 2023-11-04 NOTE — E-VISIT TREATED
Chief Complaint: Cold, flu, COVID, sinus, hay fever, or seasonal allergies   Patient introduction   Patient is 33-year-old female with cough, congestion, nasal discharge, itchy or watery eyes, itchy nose or sneezing, headache, and diarrhea that started less than 48 hours ago. Regarding date of symptom onset, patient writes: 11/03/2023.   COVID-19 testing history, vaccination status, and exposure:    Has not been tested for COVID-19 since symptom onset.    Patient was prompted to take a self-test during the interview, but does not have a COVID-19 test kit.    Received 2 doses of the COVID-19 vaccine (Pfizer, Pfizer). Received their most recent dose of the vaccine more than 14 days ago.    No high-risk (household) exposure to COVID-19 within the last 14 days.    No travel outside local community within the last 14 days.   Risk factors for severe disease from COVID-19 infection    BMI >= 40.    Asthma.    A mood disorder.   General presentation   Symptoms came on suddenly.   Fever:    No fever.   Sinus and nasal symptoms:    Nasal discharge.    Itchy nose or sneezing.    Yellow nasal drainage.    Nasal drainage is thin.    Postnasal drip.    1 to 3 episodes of antibiotic treatment for sinus infection in the last year.    Sinus pain or pressure on or around the forehead, nose, and cheeks.    Patient first noticed sinus pain less than 5 days ago.    Sinus pain is worse with Valsalva.    No history of unhealed nasal septal ulcer/nasal wound.    No history of deviated septum or nasal polyps.   Throat symptoms:    No sore throat.   Head and body aches:    Headache described as mild (1 to 3 on a scale of 1 to 10).    No sweats.    No chills.    No myalgia.    No fatigue.   Cough:    Cough is worse at night/while sleeping.    Cough is mildly productive of sputum.    Describes color of sputum as green.   Wheezing and shortness of breath:    Has asthma diagnosis.    Wheezing.    Current cold symptoms aggravate their  asthma.    Using an albuterol inhaler and an inhaled steroid with long-acting bronchodilator for asthma.    Using albuterol every 4 to 6 hours.    Patient requests a prescription of albuterol in the form of an inhaler.    No COPD diagnosis.    No shortness of breath.   Chest pain:    No chest pain.   Ear symptoms:    Current symptoms include fullness in the ear(s).   Dizziness:    No dizziness.   Allergies:    Patient has known seasonal allergies, known perennial allergies, known pet allergies, and known dust allergies.    Patient thinks symptoms are allergy-related.   Flu exposure:    No recent known exposure to a person with a confirmed flu diagnosis.    Has not had a flu vaccine this season.   Not taking any over-the-counter medications for current symptoms.   Review of red flags/alarm symptoms:    No changes in alertness or awareness.    No symptoms suggesting respiratory distress.    No symptoms suggesting intracranial hemorrhage.    No decreased urination.   Pregnancy/menstrual status/breastfeeding:    Not pregnant.    Not breastfeeding.    Regarding date of last menstrual period, patient writes: Pcos.   Self-exam:    Height: 162 centimeters    Weight: 112.0 kilograms    Swollen/painful neck lymph nodes.    No periorbital edema.   Recent antibiotic use:    Has not taken antibiotics for similar symptoms within the past month.   Current medications   Currently taking hydrOXYzine pamoate 25 MG capsule, albuterol 1.25 MG/3ML nebulizer solution, spironolactone 50 MG tablet, budesonide-formoterol 160-4.5 MCG/ACT inhaler, Cariprazine HCl 1.5 MG capsule capsule, lamoTRIgine 25 MG tablet, and albuterol sulfate  (90 Base) MCG/ACT inhaler.   Medication allergies    Cephalosporins (reaction: raised, red, itchy spots with each spot lasting less than 24 hours and breathing difficulties)   Medication contraindication review   Patient has history of depression and NSAID-induced asthma/urticaria. Therefore, the  following medication(s) will not be prescribed:    Metoclopramide.    Aspirin-chlorpheniramine-phenylephrine.   No history of metoclopramide-associated dystonic reaction and tardive dyskinesia.   No known history of amoxicillin-clavulanate-associated cholestatic jaundice or hepatic impairment.   No known history of azithromycin-associated cholestatic jaundice or hepatic impairment.   Past medical history   Immune conditions: No immunocompromising conditions.   No history of cancer.   Social history   Never smoked tobacco.   High-risk household contacts:    Household contact with asthma.    Household contact with diabetes.   Patient requests a 2-day excuse note.   Assessment   Acute nasopharyngitis (common cold).   This is the likely diagnosis based on patient's interview responses, including:    Symptom profile   Plan   Medications:    albuterol sulfate HFA 90 mcg/actuation aerosol inhaler RX 90mcg/puff 2 puffs inhaled q6h PRN 10d for wheezing. If symptoms are severe, may use as often as every 4 hours. Amount is 1 each.    prednisone 20 mg tablet RX 20mg 2 tabs PO qd 5d for asthma exacerbation. You must finish the entire course of medication, even if you feel better after the first few days of treatment. Amount is 10 tab.    pseudoephedrine 60mg tablet OTC 60mg 1 tab PO q6h PRN 5d for nasal congestion. Do not exceed 240mg (4 pills) in a 24-hour period. Amount is 20 tab.   The patient's prescriptions will be sent to:   [facility] [id number]   [address] [place] [address] [address] [place] [address]   Phone: [phone]     Fax: [phone]   Patient informed to purchase OTC medication.   Other:   Patient was given an excuse note for 2 days.   Education:    Condition and causes    Prevention    Treatment and self-care    When to call provider   ----------   Electronically signed by JOE Brown on 2023-11-04 at 13:48PM   ----------   Patient Interview Transcript:   Which of these symptoms are bothering you?  Select all that apply.    Cough    Stuffed-up nose or sinuses    Runny nose    Itchy or watery eyes    Itchy nose or sneezing    Headache    Diarrhea   Not selected:    Shortness of breath    Loss of smell or taste    Sore throat    Hoarse voice or loss of voice    Fever    Sweats    Chills    Muscle or body aches    Fatigue or tiredness    Nausea or vomiting    I don't have any of these symptoms   When did your current symptoms start? Select one.    Less than 48 hours ago   Not selected:    3 to 5 days ago    6 to 9 days ago    10 to 14 days ago    2 to 3 weeks ago    3 to 4 weeks ago    More than a month ago   Do you know the exact date your symptoms started? If so, enter the date as MM/DD/YY. Select one.    Yes (specify): 11/03/2023   Not selected:    No   Did your symptoms come on suddenly or gradually? Select one.    Suddenly   Not selected:    Gradually    I'm not sure   Since your current symptoms started, have you had a viral test for COVID-19? - This includes home self-tests as well as nose swab or saliva tests done at a doctor's office, lab, or testing site. - It does NOT include antibody tests, which use a blood sample to test for past infection. Select one.    No   Not selected:    Yes   Taking a home COVID test can help your provider give you the best care. - If you have a COVID test kit, take the test now before continuing this interview. - If you choose not to take a test or don't have one, you should still continue this interview. Your provider can still help you get care. Do you have a COVID test kit? Select one.    No, I don't have a test kit   Not selected:    Yes, and I'll take a test now    Yes, but I prefer not to take a test now   Has anyone in your household tested positive for COVID-19 in the past 14 days? Select one.    No   Not selected:    Yes   Have you gotten the COVID-19 vaccine? Select one.    Yes   Not selected:    No   How many total doses of the COVID-19 vaccine have you gotten?  This includes boosters as well as additional doses for those who are immunocompromised. Select one.    2 doses   Not selected:    1 dose    3 doses    4 doses    5 doses    6 doses   1st dose    Pfizer   Not selected:    J&J/Rain    Moderna    Novavax   2nd dose    Pfizer   Not selected:    J&J/Rain    Moderna    Novavax   When did you get your most recent dose of the COVID-19 vaccine?    More than 14 days ago   Not selected:    Less than 48 hours (2 days) ago    48 to 72 hours (3 days) ago    3 to 5 days ago    5 to 7 days ago    7 to 14 days ago   In the last 14 days, have you traveled outside of your local community? This includes travel by car, RV, bus, train, or plane. Travel increases your chances of getting and spreading COVID-19. Select one.    No   Not selected:    Yes   You mentioned having a headache. On a scale of 1 to 10, how severe is your headache pain? Select one.    Mild (1 to 3)   Not selected:    Moderate (4 to 6)    Severe (7 to 9)    Unbearable (10)    The worst headache of my life (10+)   Do you cough so hard that it's made you gag or vomit? By gag, we mean has your coughing made you choke or dry heave? Select all that apply.    Yes, my coughing has made me gag   Not selected:    Yes, my coughing has made me vomit    No   When is your cough the worst? Select all that apply.    At nighttime, or while I'm sleeping   Not selected:    In the morning, or when I wake up    During the day    I haven't noticed a difference depending on time of day   Are you coughing up mucus or phlegm? Select one.    Yes, a little   Not selected:    No, my cough is dry    Yes, a lot   What color is most of the mucus or phlegm that you're coughing up? Select one.    Green or greenish   Not selected:    Clear    White/frothy    Yellow or yellowish    Red or pink    I'm not sure   Do you feel sinus pain or pressure in any of these areas?    In my forehead    Behind my nose    In my cheeks   Not selected:    Around  "my eyes    In my upper teeth or jaw    No   When did you first notice your sinus pain or pressure? Select one.    Less than 5 days ago   Not selected:    5 to 9 days ago    10 to 14 days ago    2 to 4 weeks ago    1 month ago or longer   Does coughing, sneezing, or leaning forward make your sinuses feel worse? Select one.    Yes   Not selected:    No   What color is your nasal drainage? Select one.    Yellow or yellowish   Not selected:    Clear    White    Green or greenish    My nose is stuffed but not draining or running   Is your nasal drainage thick or thin? Select one.    Thin   Not selected:    Thick   Is there any drainage (mucus) going down the back of your throat? This kind of drainage is also called \"postnasal drip.\" It can cause frequent throat clearing. Select one.    Yes   Not selected:    No, not that I know of   Since your symptoms started, have you felt dizzy? Select one.    No   Not selected:    Yes, but I can continue with my regular daily activities    Yes, and it makes it hard to stand, walk, or do daily activities   Do you have chest pain? You might also feel it as discomfort, aching, tightness, or squeezing in the chest. Select one.    No   Not selected:    Yes   Have you urinated at least 3 times in the last 24 hours? Select one.    Yes   Not selected:    No   Changes in alertness or awareness may mean you need emergency care. Since your symptoms started, have you had any of these? Select all that apply.    None of the above   Not selected:    Confusion    Slurred speech    Not knowing where you are or what day it is    Difficulty staying conscious    Fainting or passing out   Do your symptoms include a whistling sound, or wheezing, when you breathe? Select one.    Yes   Not selected:    No   Are your eyelids or the areas around your eyes puffy? Select one.    No   Not selected:    Yes, but I can easily open my eye(s)    Yes, and it's hard to open my eye(s)    Yes, and my eye(s) are " completely swollen shut   Do you have any of these symptoms in your ear(s)? Select all that apply.    Fullness   Not selected:    Pain    Pressure    Crackling or popping    Plugged or blocked sensation    None of the above   Are your glands/lymph nodes swollen, or does it hurt when you touch them?    Yes   Not selected:    No, not that I can tell   In the past week, has anyone around you (such as at school, work, or home) had a confirmed diagnosis of the flu? A confirmed diagnosis means that a nose swab was done to verify a flu infection. Select all that apply.    No, not that I know of   Not selected:    I live with someone who has the flu    I've been within touching distance of someone who has the flu    I've walked by, or sat about 3 feet away from, someone who has the flu    I've been in the same building as someone who has the flu   Have you ever been diagnosed with asthma? Select one.    Yes   Not selected:    No   Are your cold symptoms making your asthma worse? Select one.    Yes   Not selected:    No   Are you currently using any medications or inhalers for your asthma? Select one.    Yes   Not selected:    No    I'm supposed to, but I ran out   What medications or inhalers are you currently using for your asthma? Select all that apply.    Albuterol inhaler, as needed (such as ProAir, Proventil, Ventolin)    Inhaled steroid with long-acting bronchodilator (such as Advair, Dulera, Symbicort)   Not selected:    Inhaled steroid (such as Asmanex, Qvar, Pulmicort, Flovent)    Leukotriene modifier (such as Singulair)    Oral steroids (such as prednisone)    Other (specify)   How often are you using albuterol? If you're using albuterol very frequently, you'll need to be seen in person. Select one.    Every 4 to 6 hours   Not selected:    More than once an hour    Every 1 to 2 hours    Every 2 to 3 hours    Every 3 to 4 hours    Every 6 hours or longer   Do you need a refill of albuterol? Select one.    Yes   Not  selected:    No   What form of albuterol do you prefer? Select one.    Inhaler   Not selected:    Nebulizer solution   Have you ever been diagnosed with chronic obstructive pulmonary disease (COPD)? Select one.    No, not that I know of   Not selected:    Yes   In the past month, have you taken antibiotics for similar symptoms? Examples of antibiotics include amoxicillin, amoxicillin-clavulanate (Augmentin), penicillin, cefdinir (Omnicef), doxycycline, and clindamycin (Cleocin). Select one.    No   Not selected:    Yes (specify)   In the last year, how many times were you treated with antibiotics for a sinus infection? Select one.    1 to 3 times   Not selected:    None    4 or more times   Have you been diagnosed with a deviated septum or nasal polyps? The nose is divided into two nostrils by the septum. A crooked septum is called a deviated septum. Nasal polyps are growths inside the nose or sinuses. Select one.    No, not that I know of   Not selected:    Yes, but I had surgery to treat them    Yes, I have a deviated septum    Yes, I have nasal polyps    Yes, I have a deviated septum and nasal polyps   Do you have a sore inside your nose that won't heal? Select one.    No, not that I know of   Not selected:    Yes   Do you have allergies (pollen, dust mites, mold, animal dander)? Select one.    Yes   Not selected:    No, not that I know of   What kind of allergies do you have? Select all that apply.    Seasonal allergies (hay fever)    Perennial, or year-round, allergies (hay fever)    Pet allergies    Dust allergies   Not selected:    None of the above    I'm not sure   Do you think your symptoms could be allergy-related? Select one.    Yes   Not selected:    No    I'm not sure   Have you had a flu shot this season? Select one.    No   Not selected:    Yes, less than 2 weeks ago    Yes, 2 to 4 weeks ago    Yes, 1 to 3 months ago    Yes, 3 to 6 months ago    Yes, more than 6 months ago   Are you pregnant? Select  one.    No   Not selected:    Yes   When was your last menstrual period? If you don't currently have periods or no longer have periods, please briefly explain.    Pcos   Within the last 2 weeks, have you: - Given birth - Had a miscarriage - Had a pregnancy loss - Had an  Being postpartum (live birth or loss) within the last 2 weeks increases your risk of flu complications. Select one.    No   Not selected:    Yes   Are you breastfeeding? Select one.    No   Not selected:    Yes   The flu and COVID-19 can be more serious for people in certain groups. The next few questions help us figure out if you or anyone you live with is at higher risk for complications from these infections. Do any of these statements apply to you? Select all that apply.    None of the above   Not selected:    I'm     I'm     I'm Black    I'm  or    Do you smoke tobacco? Select one.    No   Not selected:    Yes, every day    Yes, some days    No, I quit   Do you have any of these conditions? Select all that apply.    Mood disorder, including depression or schizophrenia spectrum disorders   Not selected:    Chronic lung disease, such as cystic fibrosis or interstitial fibrosis    Heart disease, such as congenital heart disease, congestive heart failure, or coronary artery disease    Disorder of the brain, spinal cord, or nerves and muscles, such as dementia, cerebral palsy, epilepsy, muscular dystrophy, or developmental delay    Metabolic disorder or mitochondrial disease    Cerebrovascular disease, such as stroke or another condition affecting the blood vessels or blood supply to the brain    Down syndrome    Substance use disorder, such as alcohol, opioid, or cocaine use disorder    Tuberculosis    None of the above   Do you live in a group care setting? Examples include: - Nursing home - Residential care - Psychiatric treatment facility - Group home - [facility] - Board and care home - Homeless  shelter - Foster care setting Select one.    No   Not selected:    Yes   Are you a healthcare worker? Select one.    No   Not selected:    Yes   People with a very high body mass index (BMI) are at higher risk for developing complications from the flu and severe illness from COVID-19. To determine your BMI, we need to know your weight and height. Please enter your weight (in pounds).    Weight   Please enter your height.    Height   Do you have any of these conditions that can affect the immune system? Scroll to see all options. Select all that apply.    None of these   Not selected:    History of bone marrow transplant    Chronic kidney disease    Chronic liver disease (including cirrhosis)    HIV/AIDS    Inflammatory bowel disease (Crohn's disease or ulcerative colitis)    Lupus    Moderate to severe plaque psoriasis    Multiple sclerosis    Rheumatoid arthritis    Sickle cell anemia    Alpha or beta thalassemia    History of solid organ transplant (kidney, liver, or heart)    History of spleen removal    An autoimmune disorder not listed here (specify)    A condition requiring treatment with long-term use of oral steroids (such as prednisone, prednisolone, or dexamethasone) (specify)   Have you ever been diagnosed with cancer? Select one.    No   Not selected:    Yes, I have cancer now    Yes, but I'm in remission   Do any of these apply to you? Select all that apply.    None of the above   Not selected:    I've been hospitalized within the last 5 days    I have diabetes    I'm in close contact with a child in    The flu and COVID-19 can be more serious for people in certain groups. Do any of these apply to the people who live with you? Select all that apply.    None of the above   Not selected:    Under age 5    Over age 65            Black     or     Pregnant    Has given birth, had a miscarriage, had a pregnancy loss, or had an  in the last 2 weeks    Does any member of your household have any of these medical conditions? Select all that apply.    Asthma    Diabetes   Not selected:    Disorders of the brain, spinal cord, or nerves and muscles, such as dementia, cerebral palsy, epilepsy, muscular dystrophy, or developmental delay    Chronic lung disease, such as COPD or cystic fibrosis    Heart disease, such as congenital heart disease, congestive heart failure, or coronary artery disease    Cerebrovascular disease, such as stroke or another condition affecting the blood vessels or blood supply to the brain    Blood disorders, such as sickle cell disease    Metabolic disorders such as inherited metabolic disorders or mitochondrial disease    Kidney disorders    Liver disorders    Weakened immune system due to illness or medications such as chemotherapy or steroids    Children under the age of 19 who are on long-term aspirin therapy    Extreme obesity (BMI > 40)    None of the above   Do you have any of these conditions? Scroll to see all options. Select all that apply.    Asthma or hives from taking aspirin or other NSAIDs, such as ibuprofen or naproxen    Depression   Not selected:    Aspirin triad (also known as Samter's triad or ASA triad)    Blockage or narrowing of the blood vessels of the heart    Blood clotting disorder    Blood dyscrasia, such anemia, leukemia, lymphoma, or myeloma    Bone marrow depression    Catecholamine-releasing paraganglioma    Congenital long QT syndrome    Difficulty urinating or completely emptying your bladder    Uncorrected electrolyte abnormalities    Fungal infection    Gastrointestinal (GI) bleeding    Gastrointestinal (GI) obstruction    G6PD deficiency    Recent heart attack    High blood pressure    Irregular heartbeat or heart rhythm    Mononucleosis (mono)    Myasthenia gravis    Parkinson's disease    Pheochromocytoma    Reye syndrome    Seizure disorder    Thyroid disease    Ulcerative colitis    None of the above    Have you ever had either of these conditions? Select all that apply.    No   Not selected:    Metoclopramide-associated dystonic reaction    Tardive dyskinesia   Just a few more questions about medications, and then you're finished. Have you used any non-prescription medications or nasal sprays for your current symptoms? Examples include saline sprays, decongestants, NyQuil, and Tylenol. Select one.    No   Not selected:    Yes   Have you taken any monoamine oxidase inhibitor (MAOI) medications in the last 14 days? Examples include rasagiline (Azilect), selegiline (Eldepryl, Zelapar), isocarboxazid (Marplan), phenelzine (Nardil), and tranylcypromine (Parnate). Select one.    No, not that I know of   Not selected:    Yes   Do you take Kynmobi or Apokyn (apomorphine)? Select one.    No   Not selected:    Yes   Are you still taking these medications listed in your medical record? If you're not taking any of these, click Next. Select all that apply.    hydrOXYzine pamoate 25 MG capsule    albuterol 1.25 MG/3ML nebulizer solution    spironolactone 50 MG tablet    budesonide-formoterol 160-4.5 MCG/ACT inhaler    Cariprazine HCl 1.5 MG capsule capsule    lamoTRIgine 25 MG tablet    albuterol sulfate  (90 Base) MCG/ACT inhaler   Are you taking any other medications, vitamins, or supplements? Select one.    No   Not selected:    Yes   Have you ever had an allergic or bad reaction to any medication? Select one.    Yes   Not selected:    No   Have you had an allergic or bad reaction to any of these medications? Select all that apply.    No, not that I know of   Not selected:    Baloxavir (Xofluza)    Benzonatate (Tessalon Perles)    Fluconazole, itraconazole, or terconazole (brands include Diflucan, Sporanox, Terazol)    Oseltamivir (Tamiflu) or zanamivir (Relenza)    Paxlovid, nirmatrelvir, or ritonavir (Norvir)   Have you had an allergic or bad reaction to any of these antibiotic medications? Select all that apply.    " Cephalexin or any \"cef-\" antibiotic, such as cefazolin, cefdinir, cefuroxime, ceftriaxone, ceftazidime, or cefepime (Brands include Ancef, Ceftin, Fortaz, Keflex, Maxipime, Rocephin, and Simplicef)   Not selected:    Penicillin or any \"-cillin\" antibiotic, such as amoxicillin, ampicillin, dicloxacillin, nafcillin, or piperacillin (Brands include Augmentin, Unasyn, and Zosyn)    Tetracycline or any \"-cycline\" antibiotic, such as doxycycline, demeclocycline, minocycline (Brands include Declomycin, Doryx, Dynacin, Oracea, Monodox, Panmycin, and Vibramycin)    Ciprofloxacin or any \"-floxacin\" antibiotic, such as gemifloxacin, levofloxacin, moxifloxacin, or ofloxacin (Brands include Factive, Cipro, Floxin, and Levaquin)    Azithromycin or any \"-thromycin\" antibiotic, such as erythromycin or clarithromycin (Brands include Biaxin, Erythrocin, Z-jose a, and Zithromax)    Clindamycin or lincomycin (Brands include Cleocin and Lincocin)    No, not that I know of   When you had an allergic or bad reaction to cephalexin or another \"cef-\" antibiotic, did you have any of these symptoms? Select all that apply.    Raised, red, itchy spots with each spot lasting less than 24 hours    Breathing difficulties   Not selected:    Swelling of the mouth, eyes, lips, or tongue    Blisters or ulcers on the lips, mouth, eyes, or vagina    Peeling skin    Feeling light-headed or faint    A fast heartbeat    Clammy skin    Confusion and anxiety    Collapsing or losing consciousness    Joint pains    Problems with kidneys, lungs, or liver    None of the above   Have you had an allergic or bad reaction to any of these medications? Select all that apply.    No, not that I know of   Not selected:    Albuterol or a similar medication    Atropine    Corticosteroid (steroid) medication, including topical steroids, inhaled steroids, nasal steroids, or oral steroids (budesonide, ciclesonide, dexamethasone, flunisolide, fluticasone, methylprednisolone, " triamcinolone, prednisone (or brand names Alvesco, Deltasone, Flovent, Medrol, Nasacort, Rhinocort, or Veramyst)    Metoclopramide (Reglan)    Ondansetron (Zuplenz, Zofran ODT, Zofran)    Prochlorperazine (Compazine)   Have you had an allergic or bad reaction to any of these eye drops, nasal sprays, or inhalers? Scroll to see all options. Select all that apply.    No, not that I know of   Not selected:    Azelastine (Astelin, Astepro, Optivar)    Cromolyn (Crolom, NasalCrom)    Ipratropium (Atrovent)    Ketotifen (Alaway, Zaditor)    Pheniramine/naphazoline (Naphcon-A, Opcon-A, Visine-A)    Olopatadine (Pataday, Patanol, Pazeo)   Have you had an allergic or bad reaction to any of these non-prescription medications? Scroll to see all options. Select all that apply.    No, not that I know of   Not selected:    Acetaminophen (Tylenol)    Aspirin    Cetirizine (Zyrtec)    Dextromethorphan (Delsym, Robitussin, Vicks DayQuil Cough)    Diphenhydramine (Benadryl)    Fexofenadine (Allegra)    Guaifenesin (Mucinex)    Dextromethorphan (Delsym)    Ibuprofen (Advil, Motrin, Midol)    Loratadine (Alavert, Claritin)    Oxymetazoline (Afrin)    Phenylephrine (Sudafed PE)    Pseudoephedrine (Sudafed)   Are you allergic to milk or to the proteins found in milk (for example, whey or casein)? A milk allergy is different from lactose intolerance. Select one.    No, not that I know of   Not selected:    Yes   Have you ever had jaundice or liver problems as a result of taking amoxicillin-clavulanate (Augmentin)? Jaundice is a condition in which the skin and the whites of the eyes turn yellow. Select all that apply.    No, not that I know of   Not selected:    Yes, jaundice    Yes, liver problems   Have you ever had jaundice or liver problems as a result of taking azithromycin (Zithromax, Zmax)? Jaundice is a condition in which the skin and the whites of the eyes turn yellow. Select all that apply.    No, not that I know of   Not  selected:    Yes, jaundice    Yes, liver problems   Do you need a doctor's note? A doctor's note confirms that you received care today and states when you can return to school or work. It does not contain information about your diagnosis or treatment plan. Your provider will make the final decision on whether to give you a doctor's note and for how long. Doctor's notes CANNOT be backdated. We can't provide medical leave paperwork through this type of visit. If more paperwork is needed to request time off, contact your primary care provider. Select one.    Today and tomorrow (2 days)   Not selected:    Today only (1 day)    3 days    5 days    7 days    10 days    14 days    No   Is there anything you'd like to add about your symptoms? Please limit your comments to the symptoms asked about in this interview. If you include comments about other concerns, your provider may recommend that you be seen in person.   The patient did not enter any additional information.   ----------   Medical history   Medical history data does not currently exist for this patient.

## 2023-11-04 NOTE — EXTERNAL PATIENT INSTRUCTIONS
View Doctor's Note     Note   You have a viral illness. Viral illnesses can last 7-10 days. The virus has likely aggravated your asthma. I do recommend testing for COVID-19. If you would like for me to send a prescriptions for a home test, I will. I can also sent in an order for you to go to urgent care and get a swab if your insurance does not pay for the over the counter. Just message me and let me know. If your symptoms last longer than 10 days follow up.   Diagnosis   Common cold   My name is JOE Brown, and I'm a healthcare provider at [facility]. I reviewed your interview, and I see that you may have a cold.   To prevent the spread of illness to others, stay home and away from other people as much as possible while you're sick. When you need to be around other people, consider wearing a face mask.   Here are the main things to know about your current symptoms:    Colds get better on their own.    You can use the medications recommended here to help ease your symptoms.   Based on what you told me in your interview, I haven't prescribed antibiotics. Antibiotics fight bacteria, not viruses. They don't help when you have a viral infection like the common cold. Antibiotics could even make you feel worse as they can cause or worsen nausea, diarrhea, and stomach pain. The following factors suggest that a virus is causing your symptoms:    You've had symptoms for less than 10 days. A bacterial infection is suspected when you've had symptoms longer than 10 days without improvement.    You haven't had a fever. Bacterial infections can cause a high fever.    Your nasal discharge is thin.    Your glands/lymph nodes are swollen or tender to the touch. Swollen lymph nodes are common with viral conditions like yours.   I've given you a doctor's note for 2 days.   Medications   Your pharmacy   [facility] [id number] [address] [place] [address] Dr Lisy Smith [place] [id number] [phone]     Prescription    Albuterol sulfate HFA (90mcg/puff): Inhale 2 puffs every 6 hours as needed for wheezing. If symptoms are severe, may use as often as every 4 hours.   Prednisone (20mg): Take 2 tablets (40mg) by mouth once daily for 5 days for asthma exacerbation. You must finish the entire course of medication, even if you feel better after the first few days of treatment.    I've prescribed a 5-day course of oral steroids to help with your asthma flare. If your symptoms don't start to improve after 2 to 3 doses, or if they get worse, follow up with your care team right away. Continue taking all of your other asthma medications.   Non-prescription   Pseudoephedrine (60mg): Take 1 tablet by mouth every 6 hours as needed for nasal congestion for up to 5 days. Do not exceed 240mg (4 pills) in a 24-hour period.    I've recommended a decongestant (pseudoephedrine) for your congestion. Although this is an over-the-counter medication, it's kept behind the pharmacist's counter. Ask the pharmacist for pseudoephedrine for your congestion.   About your diagnosis   The common cold is a viral infection of the respiratory tract, which includes the nose, throat, breathing passages, and lungs. These are the key things to know about colds:    There is no vaccine to prevent colds and no cure for a cold.    Some medications can lessen your symptoms.    You can spread a cold to other people.    Over 200 different viruses can cause the common cold. That's why you can get another cold after you've just had one.   Common symptoms include low-grade fever, sneezing, runny nose, congestion, sore throat, and cough. You may also notice fatigue, body aches, difficulty sleeping, or decreased appetite.   I see that you thought your symptoms might be allergy-related. Allergy symptoms and upper respiratory infection symptoms can be very similar.   What to expect   You should feel better within 5 to 7 days and be back to normal within 14 days.   When to seek care    Call us at [phone]   with any sudden or unexpected symptoms.    Fever that measures over 103F or continues for more than 3 days.    Your headache worsens.    Swallowing becomes extremely difficult or impossible.    Hoarse voice or loss of voice lasting longer than 2 weeks.    Your sinus pain continues for 10 days or more, without improvement.    More than 5 episodes of diarrhea in a day.    More than 5 episodes of vomiting in a day.    Coughing up red or bloody mucus.    Severe shortness of breath.    Severe stomach pain.    Symptoms that get better for a few days, and then suddenly get worse.    Severe chest pain    You mentioned that your cold symptoms are making your asthma worse. If your inhaler doesn't help or if your symptoms continue to get worse, contact us for an appointment.   Other treatment    Rest! Your body needs rest to recover and fight the virus.    Drink plenty of water to stay hydrated.    Use a clean humidifier or a cool-mist vaporizer in your room at night. Breathing humid air may help with nasal congestion and cough.    Try non-prescription saline nasal sprays to help your nasal symptoms.    Try using a Neti Pot to flush out your stuffy nose and sinuses. Neti Pots are available at any drugstore without a prescription.    Gargle with salt water several times a day to help your throat feel better. Cough drops and throat lozenges may provide extra relief. A teaspoon of honey stirred into warm water or weak tea can help soothe a sore throat and cough.    Avoid smoke and air pollution. Smoke can make infections worse.   Prevention    Avoid close contact with other people when you're sick.    Cover your mouth and nose when you cough or sneeze. Use a tissue or cough into your elbow. Make sure that used tissues go directly into the trash.    Avoid touching your eyes, nose, or mouth while you're sick.    Wash your hands often, especially after coughing, sneezing, or blowing your nose. If soap  and water are not available, use an alcohol-based hand .    If you or someone in your home or workplace is sick, disinfect commonly used items. This includes door handles, tables, computers, remotes, and pens.    Coronavirus (COVID-19) information   Common symptoms of COVID-19 include fever, cough, shortness of breath, fatigue, muscle or body aches, headaches, new loss of sense of taste or smell, sore throat, stuffy or runny nose, nausea or vomiting, and diarrhea. Most people who get COVID-19 have mild symptoms and can rest at home until they get better. Elderly people and those with chronic medical problems may be at risk for more serious complications.   FAQs about the COVID-19 vaccine   There are four authorized COVID-19 vaccines: Ilir [employer]'s Rain Vaccine (J&J/Rain), Moderna, Novavax, and Pfizer-BioNTech (Pfizer). The J&J/Rain and Novavax vaccines are approved for use in people aged 18 and older. The Moderna and Pfizer vaccines are approved for those aged 6 months and older. All four are available at no cost. Even if you don't have health insurance, you can still get the COVID-19 vaccine for free.   Which vaccine is the best? Which vaccine should I get?   All four vaccines are highly effective. Even if you get COVID-19 after being vaccinated, all of the vaccines help prevent severe disease, hospitalization, and complications.   Most people should get whichever vaccine is first available to them. However, women younger than 50 years old should consider the rare risk of blood clots with low platelets after vaccination with the J&J/Rain vaccine. This risk hasn't been seen with the other three vaccines.   Are the vaccines safe?   Yes. Hundreds of millions of people in the [place] have already safely received COVID-19 vaccines under the most intense safety monitoring in the history of the [place].   Do I need the vaccine if I've already had COVID?   Yes. Vaccination helps protect you even if you've  already had COVID.   If you had COVID-19 and had symptoms, wait to get vaccinated until you've recovered and completed your isolation period.   If you tested positive for COVID-19 but did not have symptoms, you can get vaccinated after 5 full days have passed since you had a positive test, as long as you don't develop symptoms.   How many doses of the vaccine do I need?   Visit www.cdc.gov/coronavirus/2019-ncov/vaccines/stay-up-to-date.html   to find out how to stay up to date with your COVID-19 vaccines.   I'm immunocompromised. How many doses of the vaccine do I need?   For information on how immunocompromised people can stay up to date with their COVID-19 vaccines, visit www.cdc.gov/coronavirus/2019-ncov/vaccines/recommendations/immuno.html  .   What are the common side effects of the vaccine?   A sore arm, tiredness, headache, and muscle pain may occur within two days of getting the vaccine and last a day or two. For the Moderna or Pfizer vaccines, side effects are more common after the second dose. People over the age of 55 are less likely to have side effects than younger people.   After I'm up to date on vaccines, can I still get or spread COVID?   Yes, you can still get COVID, but your disease should be milder. And your risk of serious illness, hospitalization, and complications will be much lower, especially if you're up to date. Unfortunately, you can still spread COVID if you've been vaccinated. That's why it's important to follow isolation guidelines if you get sick or test positive.   After I'm up to date on vaccines, can I go back to normal?   You should still wear a mask indoors in public if:    It's required by laws, rules, regulations, or local guidance.    You have a weakened immune system.    Your age puts you at increased risk of severe disease.    You have a medical condition that puts you at increased risk of severe disease.    Someone in your household has a weakened immune system, is at  increased risk for severe disease, or is unvaccinated.    You're in an area of high transmission.   Where can I get a COVID-19 vaccine?   Visit [facility]'s website for more information. To find a COVID-19 vaccination site near you, visit www.vaccines.gov/  , call [phone]  , or text your zip code to [phone] ([employer]). Message and data rates may apply.   What about travel?   Travel increases your risk of exposure to COVID-19. For more information, see www.cdc.gov/coronavirus/2019-ncov/travelers/index.html  .   I've had close contact with someone who has COVID. Do I need to quarantine, and if so, for how long?   For the most current answer, including a calculator to determine whether you need to stay home and for how long, visit www.cdc.gov/coronavirus/2019-ncov/your-health/quarantine-isolation.html  .   I've tested positive for COVID. How long do I need to isolate?   For the latest recommendations, including a calculator to determine how long you need to stay home, visit www.cdc.gov/coronavirus/2019-ncov/your-health/quarantine-isolation.html  .   What if I develop symptoms that might be from COVID?   For the latest recommendations on what to do if you're sick, including when to seek emergency care, visit www.cdc.gov/coronavirus/2019-ncov/if-you-are-sick/steps-when-sick.html  .    Flu vaccine information   Who should get a flu vaccine?   Everyone 6 months of age and older should get a yearly flu vaccine.   When should I get vaccinated?   You should get a flu vaccine by the end of October. Once you're vaccinated, it takes about two weeks for antibodies to develop and protect you against the flu. That's why it's important to get vaccinated as soon as possible.   After October, is it too late to get vaccinated?   No. You should still get vaccinated. As long as the flu viruses are still in your community, flu vaccines will remain available, even into January of next year or later.   Why do I need a flu vaccine  EVERY year?   Flu viruses are constantly changing, so flu vaccines are usually updated from one season to the next. Your protection from the flu vaccine also lessens over time.   Is the flu vaccine safe?   Yes. Over the last 50 years, hundreds of millions of Americans have safely received the flu vaccines.   What are the side effects of flu vaccines?   You CANNOT get the flu from a flu vaccine. Common side effects of the flu shot include soreness, redness and/or swelling where the shot was given, low grade fever, and aches. Common side effects of the nasal spray flu vaccine for adults include runny nose, headaches, sore throat, and cough. For children, side effects include wheezing, vomiting, muscle aches, and fever.   Does the flu vaccine increase your risk of getting COVID-19?   No. There is no evidence that getting a flu vaccine increases your risk of getting COVID-19.   Is it safe to get the flu vaccine along with a COVID-19 vaccine?   Yes. It's safe to get the flu vaccine with a COVID-19 vaccine or booster.   Contact your healthcare provider TODAY for details on when and where to get your flu vaccine.   Your provider   Your diagnosis was provided by JOE Brown, a member of your trusted care team at [facility].   If you have any questions, call us at [phone]  .   View Doctor's Note     Expires on 12/04/23

## 2023-12-08 ENCOUNTER — E-VISIT (OUTPATIENT)
Dept: FAMILY MEDICINE CLINIC | Facility: TELEHEALTH | Age: 33
End: 2023-12-08

## 2023-12-08 NOTE — EXTERNAL PATIENT INSTRUCTIONS
"   View Doctor's Note     Diagnosis   Low back pain   My name is Saige JOE Bruce, and I'm a healthcare provider at [facility].   Over 80% of people have low back pain at some point in their lives. Low back pain is rarely serious, and most cases get better on their own, usually within 4 to 6 weeks.   In the Other treatment section below, I've provided some tips to help you feel better.   I've given you a doctor's note for 1 day.   Medications   Your pharmacy   [facility] [id number] [address] [place] [address] Dr Lisy Smith [place] [id number] [phone]     Prescription   Methocarbamol (500mg): Take 2 tablets by mouth every 6 hours as needed for spasms, cramping, or tightness of low back muscles for up to 5 days. This is a muscle relaxant. It may cause significant drowsiness. Do not take before driving or operating machinery.   Ibuprofen (800mg): Take 1 tablet by mouth every 8 hours as needed for pain for up to 14 days. Take with food to avoid an upset stomach. Do not exceed 3200 mg (4 tablets) in a 24-hour period.    I've given you a prescription dose of ibuprofen. If it's more affordable or convenient, you may use the equivalent amount of non-prescription ibuprofen. For example, instead of taking one 800 mg ibuprofen tablet, you may take four 200 mg ibuprofen tablets. Don't take ibuprofen with other non-steroidal anti-inflammatory drugs (NSAIDs), including naproxen or aspirin. Taking these medications together can increase the risk of serious side effects.   About your diagnosis   Low back pain can have many causes. Most of the time, however, people have \"nonspecific low back pain,\" meaning that there isn't a clear disease, abnormality, or injury causing the pain. More serious cases of low back pain involve damaged discs, inflammation or swelling of the spinal joints, misaligned or fractured vertebrae, and, in rare instances, tumors or infections.   Some factors that increase your risk of developing low back pain " include:    Older age    Obesity or weight gain: Extra weight puts more stress on a person's back    Pregnancy: Pelvic changes and the added weight of a baby often cause low back pain    Low fitness level: When you're less physically fit, you may develop back pain because your back and abdominal muscles don't properly support your spine    A job that requires heavy lifting or twisting    A physically inactive job, such as sitting at a desk all day   Most people with back pain do not need X-rays, MRIs, or surgery.   What to expect   If you follow the treatments recommended here, you should feel better within a few days. Your low back pain should go away completely within 6 weeks.   Many people will develop low back pain again within 6 to 12 months of the first event. To prevent low back pain in the future, follow the advice in the Prevention section below.   When to seek care   Call us at [phone]   with any sudden or unexpected symptoms.    Your back pain makes doing simple tasks very difficult or impossible.    Your back pain doesn't improve within 6 weeks.    Numbness or weakness in your legs.    Problems controlling your bladder or bowels.    Unexplained weight loss.   Other treatment   One of the best things you can do for your low back pain is keep moving! Studies show that people recover faster from low back pain when they remain active. Walking, light stretching, and regular day-to-day activities all help relieve muscle spasms and prevent loss of muscle strength. Aerobic exercise like swimming, biking, and walking has also been shown to help decrease pain and improve overall physical functioning.   The following treatments and alternative therapies may also be helpful:    Heat: Heating pads can ease your back pain by helping muscles relax.    Yoga: Yoga involves holding and moving between postures with controlled breathing to build strength and flexibility. You may find that yoga not only helps with  your back pain but also gives you a greater sense of health and well-being.    Pilates: Pilates also involves controlled movements but focuses on the core muscles found in the abdomen and back. Benefits include better body alignment, core strength, breathing, and balance.    Community-based exercise programs: Group classes combining education and physical training may improve your pain and physical functioning.    Acupuncture, biofeedback, massage, and spinal manipulation: These therapies teach proper body alignment and relaxation techniques. They may provide pain relief and a general sense of well-being. If you're interested in a referral to one of these therapies, get in touch with your primary care provider for more information.   Prevention   What can you do to prevent back pain?    Keep moving and stay active. Exercises that strengthen and stretch the core muscles, including the back, are best. Try this exercise program from the [employer] Academy [employer]: http://orthoinfo.aaos.org/topic.cfm?kptgt=u24317  . It's designed to promote flexibility and strength in the lower back.    Avoid slouching and don't sit or  the same position for too long. Make sure your work surfaces are at a comfortable height.    When bending and lifting, use your legs instead of your back. Bend and lift without twisting. Never lift objects that are too heavy.    Always wear comfortable, low-heeled shoes.    Eat whole, nutrient-dense foods and avoid weight gain. Excess weight around the waistline can strain lower back muscles.    Avoid smoking. Smoking increases the risk of spinal disc degeneration and osteoporosis. Coughing due to heavy smoking may also lead to strained muscles and back pain.   Your provider   Your diagnosis was provided by JOE Thorpe, a member of your trusted care team at [facility].   If you have any questions, call us at [phone]  .   View Doctor's Note     Expires on  01/07/24

## 2023-12-08 NOTE — E-VISIT TREATED
Chief Complaint: Low back pain   Patient introduction   Patient is 33-year-old female with pain on or along the spine. Back pain is not work-related. Not receiving disability compensation related to their back pain. No lawsuit regarding their back pain.   General presentation   Low back pain for less than 1 week. Patient has moderate pain that makes everyday activities uncomfortable.   Pain described as sharp, shooting, stabbing, or burning. Pain is not worse at night or when supine. Patient suspects current symptoms were caused by heavy lifting. Regarding an additional suspected cause of their back pain, patient writes: Moving. Alleviating factors include resting/lying down and stretching. Aggravating factors include sitting for an extended period, bending over, and leaning to the side.   No prior back pain.   No history of spinal/back procedures in the last year.   Urinary symptoms    No burning with urination    No frequent urination    No blood in urine   Treatments tried for current symptoms:    Has not tried any treatments for current symptoms.   Review of red flags/alarm symptoms:    No spinal pain along with long-term oral steroid use, an associated bruise or scrape, or a history of osteoporosis    Back pain has been present for less than 6 weeks    No back pain due to significant trauma    No urinary incontinence, urinary retention, or fecal incontinence    No saddle anesthesia    No history of osteoporosis    No pelvic or lower abdominal pain    No nausea or vomiting    No pain that is worse after eating    No difficulty walking or staying upright as a result of back pain    No bacteremia, sepsis, or endocarditis    No hemodialysis within the last 2 months    No injection drug use in the last 6 months    No bilateral leg weakness    No fever, severe fatigue, unintentional weight loss, or drenching night sweats    No history of cancer   Self-exam:    No radicular pain.    Patient can squat down and rise  up to a standing position without difficulty (L4 motor screening).    Patient can walk on heels for 10 steps without difficulty (L5 motor screening).    Patient can walk on toes for 10 steps without difficulty (S1 motor screening).   Prognosis and risk stratification:   Patient may be willing to do physical therapy, depending on cost.   Pregnancy/menstrual status/breastfeeding:   Not pregnant. Not breastfeeding. Regarding date of last menstrual period, patient writes: 11/17.   Current medications   Currently taking hydrOXYzine pamoate 25 MG capsule, albuterol 1.25 MG/3ML nebulizer solution, spironolactone 50 MG tablet, lamoTRIgine 25 MG tablet, and albuterol sulfate  (90 Base) MCG/ACT inhaler.   Medication allergies   No relevant drug allergies.   Medication contraindication review   No history of heart block/conduction disorders/arrhythmias, ASA- or NSAID-induced asthma or urticaria, aspirin-exacerbated respiratory disease (AERD), congestive heart failure, hyperthyroidism, recent CABG surgery, or recent myocardial infarction.   Past medical history   Immune conditions: No immunocompromising conditions.   No history of cancer.   Patient-submitted comments   Patient was asked if they had anything to add about their symptoms. Patient writes: Feel I strained it moving.   Patient requests a 1-day excuse note.   Assessment   Low back pain.   This is the likely diagnosis based on patient's interview responses, including the absence of radicular pain.   Plan   Medications:    methocarbamol 500 mg tablet RX 500mg 2 tabs PO q6h PRN 5d for spasms, cramping, or tightness of low back muscles. May cause significant drowsiness. Do not take before driving or operating machinery. Amount is 40 tab.    ibuprofen 800 mg tablet RX 800mg 1 tab PO q8h PRN 14d for pain. Take with food to avoid an upset stomach. Do not exceed 3200 mg (4 tabs) in a 24-hour period. Amount is 42 tab.   The patient's prescriptions will be sent to:   [id number]   [address] [place] [address]   Phone: [phone]     Fax: [phone]   Other:   Patient was given an excuse note for 1 day.   Education:    Condition and causes    Prevention    Treatment and self-care    When to call provider   ----------   Electronically signed by JOE Thorpe on 2023-12-08 at 09:37AM   ----------   Patient Interview Transcript:   Where on your lower back do you feel pain? This interview treats low back pain only. Select one.    Along the spine or bony part of my back   Not selected:    Right side    Left side    Both sides   Since your back pain started, have you had any of these stomach- or bladder-related symptoms? Select all that apply.    None of these   Not selected:    Abdominal pain or discomfort    Nausea    Vomiting    Pain that's worse after eating    Pelvic or lower abdominal pain    Burning with urination    Frequent urination    Blood in your urine   Do any of these statements apply to you? Select all that apply.    None of the above   Not selected:    I've been taking oral steroids such as prednisone for a long time    I have a bruise or scrape on my spine where the pain is    I have osteoporosis   How long have you had your current episode of back pain? Select one.    Less than 1 week   Not selected:    1 to 2 weeks    3 to 4 weeks    5 to 6 weeks    More than 6 weeks   What does the pain feel like? Select one.    Sharp, shooting, stabbing, or burning   Not selected:    Dull or aching    Other (please describe)   How severe is your back pain? Think about how the pain affects your everyday activities. On a scale of 1 to 10, for example, how difficult is it to get out of bed, get dressed, shower, or go to work or school? Select one.    Moderate, 4 to 6; my pain is strong, and it makes everyday activities uncomfortable   Not selected:    Mild, 1 to 3; my pain is noticeable and distracting, but I am still able to do everyday  activities    Severe, 7 to 9; my pain is distressing, and it limits me from doing some everyday activities    Unbearable, 10+; my pain is so intense that it keeps me from doing almost all everyday activities   Does the pain travel down from your lower back to your buttock, thigh, lower leg, or foot? Select one.    No   Not selected:    Yes, it travels down my right side    Yes, it travels down my left side    Yes, it travels down both sides   Since your back pain started, have you had numbness or loss of feeling in the pattern as shown?    No   Not selected:    Yes    I have an underlying condition that causes chronic numbness or loss of feeling in that area   Does your back pain get worse at night or when you're lying down? Select one.    No   Not selected:    Yes   Do any of these make your back pain worse? Select all that apply.    Sitting in one position for a long time    Bending over    Leaning to the side   Not selected:    Being on my feet for a long time    Physical activity or exercise    Standing up from a bent over position    Twisting to the side    Coughing or sneezing    None of the above   Do any of these help your back feel better? Select all that apply.    Resting or lying down    Stretching   Not selected:    Frequently changing positions    Physical activity or exercise    None of the above   Since your back pain started, have you stumbled or fallen because of problems with balance or coordination? Select one.    No   Not selected:    Yes    I have an underlying condition that prevents me from standing or walking    I have an underlying condition that causes problems with my balance or coordination   Along with your back pain, have you noticed a loss of strength in your legs? Select one.    No   Not selected:    Yes, but only in one leg    Yes, in both legs    I have limited or no strength in my legs because of a chronic underlying condition   Since your back pain started, have you had any of  these symptoms? Back pain doesn't usually come with other symptoms that affect your whole body. Select all that apply.    None of these   Not selected:    Unexplained fever    Severe fatigue that doesn't improve with rest    Unintentional weight loss    Drenching night sweats   Since your back pain began, have you noticed any loss of bowel or bladder function? This includes urinating or having a bowel movement when you didn't mean to. It also includes feeling like you can't urinate or empty your bladder all the way. Select one.    No   Not selected:    Yes   Can you squat down and then rise to a standing position?    Yes, with ease   Not selected:    Yes, but my right leg seems weaker than my left leg    Yes, but my left leg seems weaker than my right leg    No, my right leg is too weak    No, my left leg is too weak    No, both my legs are too weak   Can you walk on your heels for at least 10 steps?    Yes, with ease   Not selected:    Yes, but it's hard to keep my right toes up    Yes, but it's hard to keep my left toes up    No, because I can't keep my right toes up high enough    No, because I can't keep my left toes up high enough    No, because I can't keep my toes up on both the right and left sides   Can you walk on your toes for at least 10 steps?    Yes, with ease   Not selected:    Yes, but it's harder to keep my right heel up    Yes, but it's harder to keep my left heel up    No, because I can't keep my right heel up high enough    No, because I can't keep my left heel up high enough    No, because I can't keep my heels up on both my right and left sides   The next few questions ask you about what may have caused your back pain. Was your back pain triggered by any of these activities? Select all that apply.    Heavy lifting    Another activity (specify): Moving   Not selected:    Bending over    A new sport or activity    An awkward position    Intense exercise    A twisting motion    No, not that I'm  "aware of   Is your back pain the result of a serious injury, fall, or accident? A serious injury or fall might include falling off a ladder more than 10 feet high or being involved in a car accident more serious than a \"fender velasquez.\" Select one.    No   Not selected:    Yes   Is your back pain work-related? This includes injuries suffered at work and symptoms caused by repetitive activities at work (such as overuse injuries). Select one.    No   Not selected:    Yes   Within the last year, have you had any medical procedures done on your spine or back? Examples include back or spine surgeries, spinal injections, epidural injections, and epidural catheter placement. Select one.    No   Not selected:    Yes   Have you recently been treated for bacteremia, sepsis, or endocarditis? Bacteremia is a condition where bacteria is found in the blood. Sepsis can be a serious, life-threatening complication of bacteremia. Endocarditis is an infection of the lining of the heart and heart valves. Select one.    No   Not selected:    Yes   Have you had hemodialysis within the last 2 months? Hemodialysis (also known as dialysis) is a treatment for kidney failure. Select one.    No   Not selected:    Yes   In the last 6 months, have you used any injection drugs, such as heroin, cocaine, crystal meth, amphetamines, or opiates? Using injection drugs can put you at risk for serious infections of the spine and surrounding tissues. Your response to this question will only be shared with your healthcare provider. Select one.    No   Not selected:    Yes   Have you had low back pain before? Select one.    No   Not selected:    Yes, and it felt similar    Yes, but it felt different   Are you currently receiving any disability compensation related to your back pain? If so, you may be instructed to follow up with your treating physician. Select one.    No   Not selected:    Yes   Are you currently involved in a lawsuit associated with your " back pain? Have you filed any legal action regarding your back pain? If so, you may be instructed to follow up with your treating physician. Select one.    No   Not selected:    Yes   Are you pregnant? Select one.    No   Not selected:    Yes   When was your last menstrual period? If you don't currently have periods or no longer have periods, please briefly explain.    11/17   Are you breastfeeding? Select one.    No   Not selected:    Yes   Do you have any of these conditions that can affect the immune system? Scroll to see all options. Select all that apply.    None of these   Not selected:    History of bone marrow transplant    Chronic kidney disease    Chronic liver disease (including cirrhosis)    HIV/AIDS    Inflammatory bowel disease (Crohn's disease or ulcerative colitis)    Lupus    Moderate to severe plaque psoriasis    Multiple sclerosis    Rheumatoid arthritis    Sickle cell anemia    Alpha or beta thalassemia    History of solid organ transplant (kidney, liver, or heart)    History of spleen removal    An autoimmune disorder not listed here (specify)    A condition requiring treatment with long-term use of oral steroids (such as prednisone, prednisolone, or dexamethasone) (specify)   Have you ever been diagnosed with cancer? Select one.    No   Not selected:    Yes, I have cancer now    Yes, but I'm in remission   Have you had gastric bypass surgery? Select one.    No   Not selected:    Yes   Have you tried any treatments for your low back pain? Select one.    No   Not selected:    Yes   If recommended by your provider, would you be willing to try physical therapy? Physical therapy may include hands-on therapy, strength and flexibility exercises, and posture training. We'll keep in mind your preferences when creating a treatment plan. Select all that apply.    Maybe, depending on the cost   Not selected:    Yes    Maybe, depending on the time commitment    No   Let's make sure the medications in your  treatment plan are safe for you to take. Are you being treated for any of these conditions? Select all that apply.    None of the above   Not selected:    Arrhythmias, heart block, or conduction disorders    Aspirin- or NSAID-induced asthma or urticaria    Aspirin-exacerbated respiratory disease (AERD)    Congestive heart failure    Hyperthyroidism    Recent coronary artery bypass graft (CABG) surgery    Recent heart attack   Have you taken any monoamine oxidase inhibitor (MAOI) medications in the last 14 days? Examples include rasagiline (Azilect), selegiline (Eldepryl, Zelapar), isocarboxazid (Marplan), phenelzine (Nardil), and tranylcypromine (Parnate). Select one.    No   Not selected:    Yes   Are you currently taking either of these medications? Select all that apply.    Neither of these   Not selected:    Ciprofloxacin (Cipro)    Fluvoxamine (Luvox)   Are you still taking these medications listed in your medical record? If you're not taking any of these, click Next. Select all that apply.    hydrOXYzine pamoate 25 MG capsule    albuterol 1.25 MG/3ML nebulizer solution    spironolactone 50 MG tablet    lamoTRIgine 25 MG tablet    albuterol sulfate  (90 Base) MCG/ACT inhaler   Are you taking any other medications, vitamins, or supplements? Select one.    No   Not selected:    Yes   Have you ever had an allergic or bad reaction to any medication? Select one.    Yes   Not selected:    No   Have you ever had an allergic or bad reaction to any of these medications? Select all that apply.    No   Not selected:    Acetaminophen (Tylenol)    Cyclobenzaprine hydrochloride (Flexeril)    Diclofenac (Voltaren)    Ibuprofen (Advil, Motrin, Midol)    Meloxicam (Mobic)    Methocarbamol (Robaxin)    Naproxen (Aleve)    Tizanidine (Zanaflex)   Do you need a doctor's note? A doctor's note confirms that you received care today and states when you can return to school or work. It does not contain information about your  diagnosis or treatment plan. Your provider will make the final decision on whether to give you a doctor's note. Doctor's notes CANNOT be backdated. Select one.    Today only (1 day)   Not selected:    Today and tomorrow (2 days)    3 days    No   Is there anything you'd like to add about your symptoms? Please limit your comments to the symptoms asked about in this interview. If you include comments about other concerns, your provider may recommend that you be seen in person.    Feel I strained it moving   ----------   Medical history   The following information was received from the EMR on December 08, 2023.   Allergies:    CEFUROXIME AXETIL   - Allergy Type: Medication   - Reaction: Other (See Comments)   - Severity: High   - Clinical Status: Active   - Verification Status: Confirmed    AMOXICILLIN-POT CLAVULANATE   - Allergy Type: Medication   - Reaction: Nausea And Vomiting   - Severity: High   - Clinical Status: Active   - Verification Status: Confirmed    CEFACLOR   - Allergy Type: Medication   - Reaction: Other (See Comments)   - Severity: High   - Clinical Status: Active   - Verification Status: Confirmed    CEFIXIME   - Allergy Type: Medication   - Reaction: Nausea And Vomiting, Shortness Of Breath   - Severity: High   - Clinical Status: Active   - Verification Status: Confirmed    CEFPROZIL   - Allergy Type: Medication   - Reaction: Shortness Of Breath   - Severity: High   - Clinical Status: Active   - Verification Status: Confirmed    CEPHALEXIN   - Allergy Type: Medication   - Reaction: Shortness Of Breath   - Severity: High   - Clinical Status: Active   - Verification Status: Confirmed    CEPHALOSPORINS   - Allergy Type: Medication   - Reaction: Shortness Of Breath   - Severity: High   - Clinical Status: Active   - Verification Status: Confirmed    LATEX   - Allergy Type: Medication   - Reaction: Shortness Of Breath   - Severity: High   - Clinical Status: Active   - Verification Status: Confirmed     PROMETHAZINE HCL   - Allergy Type: Medication   - Reaction: Other (See Comments)   - Severity: High   - Clinical Status: Active   - Verification Status: Confirmed    LAMICTAL [LAMOTRIGINE]   - Allergy Type: Medication   - Reaction: Rash   - Severity: High   - Clinical Status: Active   - Verification Status: Confirmed    OTHER   - Allergy Type:   - Reaction: Other (See Comments)   - Severity: High   - Clinical Status: Active   - Verification Status: Confirmed   Medications:    fluticasone (FLONASE) nasal spray   - Route: Nasal   - Start Date: August 24, 2022   - End Date: None   - Status: Active    hydrOXYzine pamoate (VISTARIL) capsule   - Route:   - Start Date: March 20, 2023   - End Date: None   - Status: Active    ondansetron (ZOFRAN-ODT) disintegrating tablet   - Route: Translingual   - Start Date: October 18, 2023   - End Date: None   - Status: Active    brompheniramine-pseudoephedrine-DM syrup 2-30 mg/10mL   - Route:   - Start Date: October 18, 2023   - End Date: None   - Status: Active    levocetirizine (XYZAL) tablet 5 mg   - Route: Oral   - Start Date: February 17, 2020   - End Date: None   - Status: Active    albuterol (PROVENTIL) nebulizer solution 0.042% 1.25 mg/3mL   - Route: Nebulization   - Start Date: February 17, 2021   - End Date: None   - Status: Active    spironolactone (ALDACTONE) tablet   - Route: Oral   - Start Date: March 20, 2023   - End Date: None   - Status: Active    budesonide-formoterol (SYMBICORT) inhaler 160-4.5 mcg/puff   - Route: Inhalation   - Start Date: March 20, 2023   - End Date: None   - Status: Active    cariprazine (VRAYLAR) capsule   - Route: Oral   - Start Date: July 05, 2022   - End Date: None   - Status: Active    lamoTRIgine (laMICtal) tablet   - Route: Oral   - Start Date: September 06, 2022   - End Date: None   - Status: Active    albuterol (PROVENTIL HFA;VENTOLIN HFA;PROAIR HFA) inhaler   - Route: Inhalation   - Start Date: March 20, 2023   - End Date: None    - Status: Active   Problem list:    Abnormal weight gain   - Category: Problem List Item   - Health Status:   - Start Date: February 04, 2021   - End Date: None   - Status: Inactive    Allergy   - Category: Problem List Item   - Health Status:   - Start Date: February 04, 2021   - End Date: None   - Status: Active    Anemia   - Category: Problem List Item   - Health Status:   - Start Date: February 04, 2021   - End Date: None   - Status: Active    Anovulation   - Category: Problem List Item   - Health Status:   - Start Date: February 04, 2021   - End Date: None   - Status: Active    Asthma exacerbation   - Category: Problem List Item   - Health Status:   - Start Date: February 04, 2021   - End Date: November 30, 2021   - Status: Resolved    Moderate persistent asthma without complication   - Category: Problem List Item   - Health Status:   - Start Date: February 04, 2021   - End Date: None   - Status: Active    Body mass index (BMI) of 50-59.9 in adult   - Category: Problem List Item   - Health Status:   - Start Date: February 04, 2021   - End Date: None   - Status: Active    Chronic pulmonary embolism   - Category: Problem List Item   - Health Status:   - Start Date: February 04, 2021   - End Date: None   - Status: Active    Diarrhea   - Category: Problem List Item   - Health Status:   - Start Date: February 04, 2021   - End Date: November 30, 2021   - Status: Resolved    DUB (dysfunctional uterine bleeding)   - Category: Problem List Item   - Health Status:   - Start Date: February 04, 2021   - End Date: None   - Status: Active    Dysphagia   - Category: Problem List Item   - Health Status:   - Start Date: February 04, 2021   - End Date: November 30, 2021   - Status: Resolved    Fertility testing   - Category: Problem List Item   - Health Status:   - Start Date: February 04, 2021   - End Date: None   - Status: Active    Encounter for screening for other disorder   - Category: Problem List Item   - Health Status:    - Start Date: February 04, 2021   - End Date: None   - Status: Inactive    Headache, migraine   - Category: Problem List Item   - Health Status:   - Start Date: February 04, 2021   - End Date: None   - Status: Active    Memory loss   - Category: Problem List Item   - Health Status:   - Start Date: February 04, 2021   - End Date: None   - Status: Active    Bipolar depression   - Category: Problem List Item   - Health Status:   - Start Date: February 04, 2021   - End Date: None   - Status: Active    MRSA infection   - Category: Problem List Item   - Health Status:   - Start Date: February 04, 2021   - End Date: None   - Status: Active    Nausea and vomiting   - Category: Problem List Item   - Health Status:   - Start Date: February 04, 2021   - End Date: November 30, 2021   - Status: Resolved    Neck pain   - Category: Problem List Item   - Health Status:   - Start Date: February 04, 2021   - End Date: None   - Status: Active    Morbid (severe) obesity due to excess calories   - Category: Problem List Item   - Health Status:   - Start Date: February 04, 2021   - End Date: None   - Status: Active    Other specified sites of sprains and strains   - Category: Problem List Item   - Health Status:   - Start Date: February 04, 2021   - End Date: None   - Status: Active    Snoring   - Category: Problem List Item   - Health Status:   - Start Date: February 04, 2021   - End Date: None   - Status: Active    Tinea pedis   - Category: Problem List Item   - Health Status:   - Start Date: February 04, 2021   - End Date: None   - Status: Active    Urinary tract infection   - Category: Problem List Item   - Health Status:   - Start Date: February 04, 2021   - End Date: November 30, 2021   - Status: Resolved    PCOS (polycystic ovarian syndrome)   - Category: Problem List Item   - Health Status:   - Start Date: November 30, 2021   - End Date: None   - Status: Active    Exposure to COVID-19 virus   - Category: Problem List Item    - Health Status:   - Start Date: January 02, 2022   - End Date: None   - Status: Active    Bacterial sinusitis   - Category: Problem List Item   - Health Status:   - Start Date: August 24, 2022   - End Date: None   - Status: Active

## 2024-01-18 ENCOUNTER — E-VISIT (OUTPATIENT)
Dept: FAMILY MEDICINE CLINIC | Facility: TELEHEALTH | Age: 34
End: 2024-01-18
Payer: COMMERCIAL

## 2024-01-18 PROCEDURE — BRIGHTMDVISIT: Performed by: NURSE PRACTITIONER

## 2024-01-18 NOTE — E-VISIT TREATED
Chief Complaint: Cold, flu, COVID, sinus, hay fever, or seasonal allergies   Patient introduction   Patient is 33-year-old female with congestion, nasal discharge, itchy or watery eyes, itchy nose or sneezing, headache, and myalgia that started less than 48 hours ago. Regarding date of symptom onset, patient writes: 01/17/23.   COVID-19 testing history, vaccination status, and exposure:    Patient was tested for COVID-19 within the last 24 hours. Test result was negative.    Has not received an updated [id number] COVID-19 vaccination (Pfizer-BioNTech or Moderna vaccine after September 12, 2023; or Novavax vaccine after October 3, 2023).    No known exposure to a person with a confirmed or suspected case of COVID-19.    No high-risk (household) exposure to COVID-19 within the last 14 days.   Risk factors for severe disease from COVID-19 infection    BMI >= 40.    Mostly sedentary lifestyle.    Asthma.    A mood disorder.   General presentation   Symptoms came on gradually.   Fever:    No fever.   Sinus and nasal symptoms:    Nasal discharge.    Itchy nose or sneezing.    Green nasal drainage.    Nasal drainage is thin.    Postnasal drip.    Sinus pain or pressure on or around the eyes, nose, and cheeks.    Patient first noticed sinus pain less than 5 days ago.    Sinus pain is not worse with Valsalva.    No history of unhealed nasal septal ulcer/nasal wound.    No history of antibiotic treatment for sinus infection in the last year.    No history of deviated septum or nasal polyps.   Throat symptoms:    No sore throat.   Head and body aches:    Headache described as moderate (4 to 6 on a scale of 1 to 10).    Myalgia.    No sweats.    No chills.    No fatigue.   Cough:    No cough.   Wheezing and shortness of breath:    Has asthma diagnosis.    Patient has a home pulse oximeter and a home blood pressure monitor.    Regarding exact blood pressure reading and heart rate, patient writes: 118/90.    Regarding O2  "saturation percentage and heart rate BPM readings on their home pulse oximeter, patient writes: 99.    Patient has been prescribed an albuterol inhaler and an inhaled steroid with long-acting bronchodilator for asthma, but is not currently using any asthma medications because they have run out.    Patient requests a prescription of albuterol in the form of an inhaler.    No COPD diagnosis.    No wheezing.    No shortness of breath.    Current cold symptoms do not aggravate their asthma.   Chest pain:    No chest pain.   Ear symptoms:    Current symptoms include fullness in the ear(s).   Dizziness:    Mild dizziness that does not interfere with daily activities.   Allergies:    Patient has known seasonal allergies, known perennial allergies, known pet allergies, and known dust allergies.    Patient does not think symptoms are allergy-related.   Flu exposure:    No recent known exposure to a person with a confirmed flu diagnosis.    Has not had a flu vaccine this season.   Not taking any over-the-counter medications for current symptoms.   Review of red flags/alarm symptoms:    No changes in alertness or awareness.    No symptoms suggesting intracranial hemorrhage.    No decreased urination.    No blue or gray coloring present in face, lips, or nail beds.    No swelling, pain, redness, or increased warmth in the calf or lower part of ONE leg only.    No proptosis.   Pregnancy/menstrual status/breastfeeding:    Not pregnant.    Not breastfeeding.    Regarding date of last menstrual period, patient writes: Started yesterday.   Self-exam:    Height: 5' 4\"    Weight: 247 lbs    Neck lymph nodes feel normal.    Mild periorbital edema.   Recent antibiotic use:    Has not taken antibiotics for similar symptoms within the past month.   Current medications   Currently taking hydrOXYzine pamoate 25 MG capsule, albuterol 1.25 MG/3ML nebulizer solution, spironolactone 50 MG tablet, and albuterol sulfate  (90 Base) MCG/ACT " inhaler.   Medication allergies    Cephalosporins (reaction: breathing difficulties)   Medication contraindication review   Patient has history of depression. Therefore, the following medication(s) will not be prescribed:    Metoclopramide.   No history of metoclopramide-associated dystonic reaction and tardive dyskinesia.   No known history of amoxicillin-clavulanate-associated cholestatic jaundice or hepatic impairment.   No known history of azithromycin-associated cholestatic jaundice or hepatic impairment.   Past medical history   Immune conditions:   No immunocompromising conditions.   No history of cancer.   Social history   Never smoked tobacco.   High-risk household contacts:    Household contact with asthma.    Household contact with diabetes.   Patient-submitted comments   Patient was asked if they had anything to add about their symptoms. Patient writes: Mostly in my nose and ears .   Patient requests a 1-day excuse note.   Assessment   Acute nasopharyngitis (common cold).   This is the likely diagnosis based on patient's interview responses, including:    Symptom profile    Gradual onset of symptoms   Plan   Medications:    pseudoephedrine 60mg tablet OTC 60mg 1 tab PO q6h PRN 5d for nasal congestion. Do not exceed 240mg (4 pills) in a 24-hour period. Amount is 20 tab.   No prescriptions were ordered to treat this patient. The patient selected the following pharmacy during their interview, but no prescriptions were sent:   [facility]/pharmacy #[id number]   [address] [place] [address]   Phone: [phone]     Fax: [phone]   Patient informed to purchase OTC medication.   Other:   Patient was given an excuse note for 1 day.   Education:    Condition and causes    Prevention    Treatment and self-care    When to call provider   ----------   Electronically signed by JOE Brown on 2024-01-18 at 10:40AM   ----------   Patient Interview Transcript:   Which of these symptoms are bothering  "you? Select all that apply.    Stuffed-up nose or sinuses    Runny nose    Itchy or watery eyes    Itchy nose or sneezing    Headache    Muscle or body aches   Not selected:    Cough    Shortness of breath    Loss of smell or taste    Sore throat    Hoarse voice or loss of voice    Fever    Sweats    Chills    Fatigue or tiredness    Nausea or vomiting    Diarrhea    I don't have any of these symptoms   When did your current symptoms start? Select one.    Less than 48 hours ago   Not selected:    3 to 5 days ago    6 to 9 days ago    10 to 14 days ago    2 to 3 weeks ago    3 to 4 weeks ago    More than a month ago   Do you know the exact date your symptoms started? If so, enter the date as MM/DD/YY. Select one.    Yes (specify): 01/17/23   Not selected:    No   Did your symptoms come on suddenly or gradually? Select one.    Gradually   Not selected:    Suddenly    I'm not sure   Since your current symptoms started, have you been tested for COVID-19? This includes home self-tests as well as nose swab or saliva tests done at a doctor's office, lab, or testing site. Select one.    Yes   Not selected:    No   When was your most recent COVID-19 test? Select one.    Within the last 24 hours   Not selected:    Within the last week (specify date as MM/DD/YY)    7 to 14 days ago    15 to 30 days ago    More than 1 month ago   What was the result of your most recent COVID-19 test? Select one.    Negative   Not selected:    Positive   Has anyone in your household tested positive for COVID-19 in the past 14 days? Select one.    No   Not selected:    Yes   In the last 14 days, have you had close contact with someone who has COVID-19? \"Close contact\" means any of these: - Caring for someone with COVID-19. - Being within 6 feet of someone with COVID-19 for a total of at least 15 minutes over a 24-hour period. For example, three 5-minute exposures for a total of 15 minutes. - Being in direct contact with respiratory droplets from " someone with COVID-19 (being coughed on, kissing, sharing utensils). Select one.    No, not that I know of   Not selected:    Yes, a confirmed case    Yes, a suspected case   Have you gotten the [id number] updated COVID-19 vaccine? This means either the updated Pfizer-TicketForEvent or Moderna vaccine after September 12, 2023; or the updated Novavax vaccine after October 3, 2023. Select one.    No   Not selected:    Yes   Since your symptoms started, have you felt dizzy? Select one.    Yes, but I can still do my regular daily activities   Not selected:    Yes, and it makes it hard to stand, walk, or do daily activities    No   Do you have any of these devices at home? Select all that apply.    A home pulse oximeter    Home blood pressure monitor   Not selected:    Apple Watch or other smart watch    FitBit or other smart wristband    Other wearable device    No   Use your pulse oximeter now. Are either of these true? Select all that apply.    Neither of these   Not selected:    My oxygen saturation is 94% or less    My heart rate is 120 beats per minute (BPM) or higher   What was the exact reading on your pulse oximeter? Include both the oxygen saturation followed by a percent sign (%), and your heart rate in beats per minute (BPM).    99   Use your blood pressure cuff now. What is your exact blood pressure reading?    118/90   You mentioned having a headache. On a scale of 1 to 10, how severe is your headache pain? Select one.    Moderate (4 to 6)   Not selected:    Mild (1 to 3)    Severe (7 to 9)    Unbearable (10)    The worst headache of my life (10+)   Do you feel sinus pain or pressure in any of these areas?    Around my eyes    Behind my nose    In my cheeks   Not selected:    In my forehead    In my upper teeth or jaw    No   When did you first notice your sinus pain or pressure? Select one.    Less than 5 days ago   Not selected:    5 to 9 days ago    10 to 14 days ago    2 to 4 weeks ago    1 month ago or  "longer   Does coughing, sneezing, or leaning forward make your sinuses feel worse? Select one.    No   Not selected:    Yes   What color is your nasal drainage? Select one.    Green or greenish   Not selected:    Clear    White    Yellow or yellowish    My nose is stuffed but not draining or running   Is your nasal drainage thick or thin? Select one.    Thin   Not selected:    Thick   Is there any drainage (mucus) going down the back of your throat? This kind of drainage is also called \"postnasal drip.\" It can cause frequent throat clearing. Select one.    Yes   Not selected:    No, not that I know of   Do you have chest pain? You might also feel it as discomfort, aching, tightness, or squeezing in the chest. Select one.    No   Not selected:    Yes   Have you urinated at least 3 times in the last 24 hours? Select one.    Yes   Not selected:    No   Do your face, lips, or nail beds appear blue or gray? Select one.    No   Not selected:    Yes   Do you have any swelling, pain, redness, or increased warmth in the calf or lower part of ONE leg only? Select one.    No   Not selected:    Yes   Changes in alertness or awareness may mean you need emergency care. Since your symptoms started, have you had any of these? Select all that apply.    None of the above   Not selected:    Confusion    Slurred speech    Not knowing where you are or what day it is    Difficulty staying conscious    Fainting or passing out   Do your symptoms include a whistling sound, or wheezing, when you breathe? Select one.    No   Not selected:    Yes   Are your eyelids or the areas around your eyes puffy? Select one.    Yes, but I can easily open my eye(s)   Not selected:    Yes, and it's hard to open my eye(s)    Yes, and my eye(s) are completely swollen shut    No   Since your symptoms started, have you noticed that one or both of your eyes is bulging or poking out? Select one.    No   Not selected:    Yes   Do you have any of these symptoms in " your ear(s)? Select all that apply.    Fullness   Not selected:    Pain    Pressure    Crackling or popping    Plugged or blocked sensation    None of the above   Are your glands/lymph nodes swollen, or does it hurt when you touch them?    No, not that I can tell   Not selected:    Yes   In the past week, has anyone around you (such as at school, work, or home) had a confirmed diagnosis of the flu? A confirmed diagnosis means that a nose swab was done to verify a flu infection. Select all that apply.    No, not that I know of   Not selected:    I live with someone who has the flu    I've been within touching distance of someone who has the flu    I've walked by, or sat about 3 feet away from, someone who has the flu    I've been in the same building as someone who has the flu   Have you ever been diagnosed with asthma? Select one.    Yes   Not selected:    No   Are your cold symptoms making your asthma worse? Select one.    No   Not selected:    Yes   Are you currently using any medications or inhalers for your asthma? Select one.    I'm supposed to, but I ran out   Not selected:    Yes    No   What medications or inhalers are you supposed to be using for your asthma? Select all that apply.    Albuterol inhaler, as needed (such as ProAir, Proventil, Ventolin)    Inhaled steroid with long-acting bronchodilator (such as Advair, Dulera, Symbicort)   Not selected:    Inhaled steroid (such as Asmanex, Qvar, Pulmicort, Flovent)    Leukotriene modifier (such as Singulair)    Oral steroids (such as prednisone)    Other (specify)   Do you need a refill of albuterol? Select one.    Yes   Not selected:    No   What form of albuterol do you prefer? Select one.    Inhaler   Not selected:    Nebulizer solution   Have you ever been diagnosed with chronic obstructive pulmonary disease (COPD)? Select one.    No, not that I know of   Not selected:    Yes   In the past month, have you taken antibiotics for similar symptoms? Examples  of antibiotics include amoxicillin, amoxicillin-clavulanate (Augmentin), penicillin, cefdinir (Omnicef), doxycycline, and clindamycin (Cleocin). Select one.    No   Not selected:    Yes (specify)   In the last year, how many times were you treated with antibiotics for a sinus infection? Select one.    None   Not selected:    1 to 3 times    4 or more times   Have you been diagnosed with a deviated septum or nasal polyps? The nose is divided into two nostrils by the septum. A crooked septum is called a deviated septum. Nasal polyps are growths inside the nose or sinuses. Select one.    No, not that I know of   Not selected:    Yes, but I had surgery to treat them    Yes, I have a deviated septum    Yes, I have nasal polyps    Yes, I have a deviated septum and nasal polyps   Do you have a sore inside your nose that won't heal? Select one.    No, not that I know of   Not selected:    Yes   Do you have allergies (pollen, dust mites, mold, animal dander)? Select one.    Yes   Not selected:    No, not that I know of   What kind of allergies do you have? Select all that apply.    Seasonal allergies (hay fever)    Perennial, or year-round, allergies (hay fever)    Pet allergies    Dust allergies   Not selected:    None of the above    I'm not sure   Do you think your symptoms could be allergy-related? Select one.    No   Not selected:    Yes    I'm not sure   Have you had a flu shot this season? Select one.    No   Not selected:    Yes, less than 2 weeks ago    Yes, 2 to 4 weeks ago    Yes, 1 to 3 months ago    Yes, 3 to 6 months ago    Yes, more than 6 months ago   Are you pregnant? Select one.    No   Not selected:    Yes   When was your last menstrual period? If you don't currently have periods or no longer have periods, please briefly explain.    Started yesterday   Within the last 2 weeks, have you: - Given birth - Had a miscarriage - Had a pregnancy loss - Had an  Being postpartum (live birth or loss) within  the last 2 weeks increases your risk of flu complications. Select one.    No   Not selected:    Yes   Are you breastfeeding? Select one.    No   Not selected:    Yes   The flu and COVID-19 can be more serious for people in certain groups. The next few questions help us figure out if you or anyone you live with is at higher risk for complications from these infections. Do any of these statements apply to you? Select all that apply.    None of the above   Not selected:    I'm     I'm     I'm Black    I'm  or    Do you smoke tobacco? Select one.    No   Not selected:    Yes, every day    Yes, some days    No, I quit   Do you have a mostly inactive lifestyle? Answer yes if all of these are true: - You spend at least 6 hours a day sitting or lying down - You get less than 2 and a half hours per week of moderate exercise such as walking fast - You get less than 1 hour and 15 minutes per week of intense exercise such as jogging or running Select one.    Yes   Not selected:    No   Do you have any of these conditions? Select all that apply.    Mood disorder, including depression or schizophrenia spectrum disorders   Not selected:    Chronic lung disease, such as cystic fibrosis or interstitial fibrosis    Heart disease, such as congenital heart disease, congestive heart failure, or coronary artery disease    Disorder of the brain, spinal cord, or nerves and muscles, such as dementia, cerebral palsy, epilepsy, muscular dystrophy, or developmental delay    Metabolic disorder or mitochondrial disease    Cerebrovascular disease, such as stroke or another condition affecting the blood vessels or blood supply to the brain    Down syndrome    Substance use disorder, such as alcohol, opioid, or cocaine use disorder    Tuberculosis    Primary immunodeficiency    None of the above   Do you live in a group care setting? Examples include: - Nursing home - Residential care - Psychiatric  treatment facility - Group home - [facility] and care home - Homeless shelter - Foster care setting Select one.    No   Not selected:    Yes   Are you a healthcare worker? Select one.    No   Not selected:    Yes   People with a very high body mass index (BMI) are at higher risk for developing complications from the flu and severe illness from COVID-19. To determine your BMI, we need to know your weight and height. Please enter your weight (in pounds).    Weight   Please enter your height.    Height   Do you have any of these conditions that can affect the immune system? Scroll to see all options. Select all that apply.    None of these   Not selected:    History of bone marrow transplant    Chronic kidney disease    Chronic liver disease (including cirrhosis)    HIV/AIDS    Inflammatory bowel disease (Crohn's disease or ulcerative colitis)    Lupus    Moderate to severe plaque psoriasis    Multiple sclerosis    Rheumatoid arthritis    Sickle cell anemia    Alpha or beta thalassemia    History of kidney, liver, heart, or other solid organ transplant    History of liver, heart, or other solid organ transplant    History of spleen removal    An autoimmune disorder not listed here (specify)    A condition requiring treatment with long-term use of oral steroids (such as prednisone, prednisolone, or dexamethasone) (specify)   Have you ever been diagnosed with cancer? Select one.    No   Not selected:    Yes, I have cancer now    Yes, but I'm in remission   Do any of these apply to you? Select all that apply.    None of the above   Not selected:    I've been hospitalized within the last 5 days    I have diabetes    I'm in close contact with a child in    The flu and COVID-19 can be more serious for people in certain groups. Do any of these apply to the people who live with you? Select all that apply.    None of the above   Not selected:    Under age 5    Over age 65             Black     or     Pregnant    Has given birth, had a miscarriage, had a pregnancy loss, or had an  in the last 2 weeks   Does any member of your household have any of these medical conditions? Select all that apply.    Asthma    Diabetes   Not selected:    Disorders of the brain, spinal cord, or nerves and muscles, such as dementia, cerebral palsy, epilepsy, muscular dystrophy, or developmental delay    Chronic lung disease, such as COPD or cystic fibrosis    Heart disease, such as congenital heart disease, congestive heart failure, or coronary artery disease    Cerebrovascular disease, such as stroke or another condition affecting the blood vessels or blood supply to the brain    Blood disorders, such as sickle cell disease    Metabolic disorders such as inherited metabolic disorders or mitochondrial disease    Kidney disorders    Liver disorders    Weakened immune system due to illness or medications such as chemotherapy or steroids    Children under the age of 19 who are on long-term aspirin therapy    Extreme obesity (BMI > 40)    None of the above   Do you have any of these conditions? Scroll to see all options. Select all that apply.    Depression   Not selected:    Aspirin triad (also known as Samter's triad or ASA triad)    Asthma or hives from taking aspirin or other NSAIDs, such as ibuprofen or naproxen    Blockage or narrowing of the blood vessels of the heart    Blood clotting disorder    Blood dyscrasia, such anemia, leukemia, lymphoma, or myeloma    Bone marrow depression    Catecholamine-releasing paraganglioma    Congenital long QT syndrome    Difficulty urinating or completely emptying your bladder    Uncorrected electrolyte abnormalities    Fungal infection    Gastrointestinal (GI) bleeding    Gastrointestinal (GI) obstruction    G6PD deficiency    Recent heart attack    High blood pressure    Irregular heartbeat or heart rhythm    Mononucleosis (mono)    Myasthenia gravis     Parkinson's disease    Pheochromocytoma    Reye syndrome    Seizure disorder    Thyroid disease    Ulcerative colitis    None of the above   Have you ever had either of these conditions? Select all that apply.    No   Not selected:    Metoclopramide-associated dystonic reaction    Tardive dyskinesia   Just a few more questions about medications, and then you're finished. Have you used any non-prescription medications or nasal sprays for your current symptoms? Examples include saline sprays, decongestants, NyQuil, and Tylenol. Select one.    No   Not selected:    Yes   Have you taken any monoamine oxidase inhibitor (MAOI) medications in the last 14 days? Examples include rasagiline (Azilect), selegiline (Eldepryl, Zelapar), isocarboxazid (Marplan), phenelzine (Nardil), and tranylcypromine (Parnate). Select one.    No, not that I know of   Not selected:    Yes   Do you take Kynmobi or Apokyn (apomorphine)? Select one.    No   Not selected:    Yes   Are you still taking these medications listed in your medical record? If you're not taking any of these, click Next. Select all that apply.    hydrOXYzine pamoate 25 MG capsule    albuterol 1.25 MG/3ML nebulizer solution    spironolactone 50 MG tablet    albuterol sulfate  (90 Base) MCG/ACT inhaler   Are you taking any other medications, vitamins, or supplements? Select one.    No   Not selected:    Yes   Have you ever had an allergic or bad reaction to any medication? Select one.    Yes   Not selected:    No   Have you had an allergic or bad reaction to any of these medications? Select all that apply.    No, not that I know of   Not selected:    Baloxavir (Xofluza)    Benzonatate (Tessalon Perles)    Fluconazole, itraconazole, or terconazole (brands include Diflucan, Sporanox, Terazol)    Oseltamivir (Tamiflu) or zanamivir (Relenza)    Paxlovid, nirmatrelvir, or ritonavir (Norvir)   Have you had an allergic or bad reaction to any of these antibiotic medications?  "Select all that apply.    Cephalexin or any \"cef-\" antibiotic, such as cefazolin, cefdinir, cefuroxime, ceftriaxone, ceftazidime, or cefepime (Brands include Ancef, Ceftin, Fortaz, Keflex, Maxipime, Rocephin, and Simplicef)   Not selected:    Penicillin or any \"-cillin\" antibiotic, such as amoxicillin, ampicillin, dicloxacillin, nafcillin, or piperacillin (Brands include Augmentin, Unasyn, and Zosyn)    Tetracycline or any \"-cycline\" antibiotic, such as doxycycline, demeclocycline, minocycline (Brands include Declomycin, Doryx, Dynacin, Oracea, Monodox, Panmycin, and Vibramycin)    Ciprofloxacin or any \"-floxacin\" antibiotic, such as gemifloxacin, levofloxacin, moxifloxacin, or ofloxacin (Brands include Factive, Cipro, Floxin, and Levaquin)    Azithromycin or any \"-thromycin\" antibiotic, such as erythromycin or clarithromycin (Brands include Biaxin, Erythrocin, Z-jose a, and Zithromax)    Clindamycin or lincomycin (Brands include Cleocin and Lincocin)    No, not that I know of   When you had an allergic or bad reaction to cephalexin or another \"cef-\" antibiotic, did you have any of these symptoms? Select all that apply.    Breathing difficulties   Not selected:    Raised, red, itchy spots with each spot lasting less than 24 hours    Swelling of the mouth, eyes, lips, or tongue    Blisters or ulcers on the lips, mouth, eyes, or vagina    _Peeling skin _    Feeling light-headed or faint    A fast heartbeat    Clammy skin    Confusion and anxiety    Collapsing or losing consciousness    Joint pains    Problems with kidneys, lungs, or liver    None of the above   Have you had an allergic or bad reaction to any of these medications? Select all that apply.    No, not that I know of   Not selected:    Albuterol or a similar medication    Atropine    Corticosteroid (steroid) medication, including topical steroids, inhaled steroids, nasal steroids, or oral steroids (budesonide, ciclesonide, dexamethasone, flunisolide, " fluticasone, methylprednisolone, triamcinolone, prednisone (or brand names Alvesco, Deltasone, Flovent, Medrol, Nasacort, Rhinocort, or Veramyst)    Metoclopramide (Reglan)    Ondansetron (Zuplenz, Zofran ODT, Zofran)    Prochlorperazine (Compazine)   Have you had an allergic or bad reaction to any of these eye drops, nasal sprays, or inhalers? Scroll to see all options. Select all that apply.    No, not that I know of   Not selected:    Azelastine (Astelin, Astepro, Optivar)    Cromolyn (Crolom, NasalCrom)    Ipratropium (Atrovent)    Ketotifen (Alaway, Zaditor)    Pheniramine/naphazoline (Naphcon-A, Opcon-A, Visine-A)    Olopatadine (Pataday, Patanol, Pazeo)   Have you had an allergic or bad reaction to any of these non-prescription medications? Scroll to see all options. Select all that apply.    No, not that I know of   Not selected:    Acetaminophen (Tylenol)    Aspirin    Cetirizine (Zyrtec)    Dextromethorphan (Delsym, Robitussin, Vicks DayQuil Cough)    Diphenhydramine (Benadryl)    Fexofenadine (Allegra)    Guaifenesin (Mucinex)    Dextromethorphan (Delsym)    Ibuprofen (Advil, Motrin, Midol)    Loratadine (Alavert, Claritin)    Oxymetazoline (Afrin)    Phenylephrine (Sudafed PE)    Pseudoephedrine (Sudafed)   Are you allergic to milk or to the proteins found in milk (for example, whey or casein)? A milk allergy is different from lactose intolerance. Select one.    No, not that I know of   Not selected:    Yes   Have you ever had jaundice or liver problems as a result of taking amoxicillin-clavulanate (Augmentin)? Jaundice is a condition in which the skin and the whites of the eyes turn yellow. Select all that apply.    No, not that I know of   Not selected:    Yes, jaundice    Yes, liver problems   Have you ever had jaundice or liver problems as a result of taking azithromycin (Zithromax, Zmax)? Jaundice is a condition in which the skin and the whites of the eyes turn yellow. Select all that apply.    No,  not that I know of   Not selected:    Yes, jaundice    Yes, liver problems   Do you need a doctor's note? A doctor's note confirms that you received care today and states when you can return to school or work. It does not contain information about your diagnosis or treatment plan. Your provider will make the final decision on whether to give you a doctor's note and for how long. Doctor's notes CANNOT be backdated. We can't provide medical leave paperwork through this type of visit. If more paperwork is needed to request time off, contact your primary care provider. Select one.    Today only (1 day)   Not selected:    Today and tomorrow (2 days)    3 days    5 days    7 days    No   Is there anything you'd like to add about your symptoms? Please limit your comments to the symptoms asked about in this interview. If you include comments about other concerns, your provider may recommend that you be seen in person.    __Mostly in my nose and ears __   ----------   Medical history   Medical history data does not currently exist for this patient.

## 2024-01-18 NOTE — EXTERNAL PATIENT INSTRUCTIONS
View Doctor's Note     Note   This is likely a viral illness. Viral illnesses do not respond to antibiotics. It is best to treat viruses with rest and drinking plenty of fluids. Over the counter medications can help ease symptoms.   Diagnosis   Common cold   My name is JOE Brown, and I'm a healthcare provider at [facility]. I reviewed your interview, and I see that you may have a cold.   To prevent the spread of illness to others, stay home and away from other people as much as possible while you're sick. When you need to be around other people, consider wearing a face mask.   Here are the main things to know about your current symptoms:    Colds get better on their own.    You can use the medications recommended here to help ease your symptoms.   Based on what you told me in your interview, I haven't prescribed antibiotics. Antibiotics fight bacteria, not viruses. They don't help when you have a viral infection like the common cold. Antibiotics could even make you feel worse as they can cause or worsen nausea, diarrhea, and stomach pain. The following factors suggest that a virus is causing your symptoms:    You've had symptoms for less than 10 days. A bacterial infection is suspected when you've had symptoms longer than 10 days without improvement.    You haven't had a fever. Bacterial infections can cause a high fever.    Your nasal discharge is thin.   I've given you a doctor's note for 1 day.   Medications   Non-prescription   Pseudoephedrine (60mg): Take 1 tablet by mouth every 6 hours as needed for nasal congestion for up to 5 days. Do not exceed 240mg (4 pills) in a 24-hour period.    I've recommended a decongestant (pseudoephedrine) for your congestion. Although this is an over-the-counter medication, it's kept behind the pharmacist's counter. Ask the pharmacist for pseudoephedrine for your congestion.   About your diagnosis   The common cold is a viral infection of the respiratory  tract, which includes the nose, throat, breathing passages, and lungs. These are the key things to know about colds:    There is no vaccine to prevent colds and no cure for a cold.    Some medications can lessen your symptoms.    You can spread a cold to other people.    Over 200 different viruses can cause the common cold. That's why you can get another cold after you've just had one.   Common symptoms include low-grade fever, sneezing, runny nose, congestion, sore throat, and cough. You may also notice fatigue, body aches, difficulty sleeping, or decreased appetite.   What to expect   You should feel better within 5 to 7 days and be back to normal within 14 days.   When to seek care   Call us at [phone]   with any sudden or unexpected symptoms.    Fever that measures over 103F or continues for more than 3 days.    Your headache worsens.    Swallowing becomes extremely difficult or impossible.    Hoarse voice or loss of voice lasting longer than 2 weeks.    Your sinus pain continues for 10 days or more, without improvement.    More than 5 episodes of diarrhea in a day.    More than 5 episodes of vomiting in a day.    Coughing up red or bloody mucus.    Severe shortness of breath.    Severe stomach pain.    Symptoms that get better for a few days, and then suddenly get worse.    Severe chest pain   Other treatment    Rest! Your body needs rest to recover and fight the virus.    Drink plenty of water to stay hydrated.    Use a clean humidifier or a cool-mist vaporizer in your room at night. Breathing humid air may help with nasal congestion and cough.    Try non-prescription saline nasal sprays to help your nasal symptoms.    Try using a Neti Pot to flush out your stuffy nose and sinuses. Neti Pots are available at any drugstore without a prescription.    Avoid smoke and air pollution. Smoke can make infections worse.   Prevention    Avoid close contact with other people when you're sick.    Cover your mouth  and nose when you cough or sneeze. Use a tissue or cough into your elbow. Make sure that used tissues go directly into the trash.    Avoid touching your eyes, nose, or mouth while you're sick.    Wash your hands often, especially after coughing, sneezing, or blowing your nose. If soap and water are not available, use an alcohol-based hand .    If you or someone in your home or workplace is sick, disinfect commonly used items. This includes door handles, tables, computers, remotes, and pens.    Coronavirus (COVID-19) information   Common symptoms of COVID-19 include fever, cough, shortness of breath, fatigue, muscle or body aches, headaches, new loss of sense of taste or smell, sore throat, stuffy or runny nose, nausea or vomiting, and diarrhea. Most people who get COVID-19 have mild symptoms and can rest at home until they get better. Elderly people and those with chronic medical problems may be at risk for more serious complications.   FAQs about the COVID-19 vaccine   Are the vaccines safe?   Yes. Hundreds of millions of people in the [place] have already safely received COVID-19 vaccines under the most intense safety monitoring in the history of the [place].   Do I need the vaccine if I've already had COVID?   Yes. Vaccination helps protect you even if you've already had COVID.   If you had COVID-19 and had symptoms, wait to get vaccinated until you've recovered and completed your isolation period.   If you tested positive for COVID-19 but did not have symptoms, you can get vaccinated after 5 full days have passed since you had a positive test, as long as you don't develop symptoms.   How many doses of the vaccine do I need?   Visit www.cdc.gov/coronavirus/2019-ncov/vaccines/stay-up-to-date.html   to find out how to stay up to date with your COVID-19 vaccines.   I'm immunocompromised. How many doses of the vaccine do I need?   For information on how immunocompromised people can stay up to date with their COVID-19  vaccines, visit www.cdc.gov/coronavirus/2019-ncov/vaccines/recommendations/immuno.html  .   What are the common side effects of the vaccine?   A sore arm, tiredness, headache, and muscle pain may occur within two days of getting the vaccine and last a day or two. For the Moderna or Pfizer vaccines, side effects are more common after the second dose. People over the age of 55 are less likely to have side effects than younger people.   After I'm up to date on vaccines, can I still get or spread COVID?   Yes, you can still get COVID, but your disease should be milder. And your risk of serious illness, hospitalization, and complications will be much lower, especially if you're up to date. Unfortunately, you can still spread COVID if you've been vaccinated. That's why it's important to follow isolation guidelines if you get sick or test positive.   After I'm up to date on vaccines, can I go back to normal?   You should still wear a mask indoors in public if:    It's required by laws, rules, regulations, or local guidance.    You have a weakened immune system.    Your age puts you at increased risk of severe disease.    You have a medical condition that puts you at increased risk of severe disease.    Someone in your household has a weakened immune system, is at increased risk for severe disease, or is unvaccinated.    You're in an area of high transmission.   Where can I get a COVID-19 vaccine?   Visit [facility]'s website for more information. To find a COVID-19 vaccination site near you, visit www.vaccines.gov/  , call [phone]  , or text your zip code to 577053 (HidInImage). Message and data rates may apply.   I've had close contact with someone who has COVID. Do I need to quarantine, and if so, for how long?   For the most current answer, including a calculator to determine whether you need to stay home and for how long, visit www.cdc.gov/coronavirus/2019-ncov/your-health/isolation.html  .   I've tested  positive for COVID. How long do I need to isolate?   For the latest recommendations, including a calculator to determine how long you need to stay home, visit www.cdc.gov/coronavirus/2019-ncov/your-health/isolation.html  .   What if I develop symptoms that might be from COVID?   For the latest recommendations on what to do if you're sick, including when to seek emergency care, visit www.cdc.gov/coronavirus/2019-ncov/if-you-are-sick/index.html  .    Flu vaccine information   Who should get a flu vaccine?   Everyone 6 months of age and older should get a yearly flu vaccine.   When should I get vaccinated?   You should get a flu vaccine by the end of October. Once you're vaccinated, it takes about two weeks for antibodies to develop and protect you against the flu. That's why it's important to get vaccinated as soon as possible.   After October, is it too late to get vaccinated?   No. You should still get vaccinated. As long as the flu viruses are still in your community, flu vaccines will remain available, even into January of next year or later.   Why do I need a flu vaccine EVERY year?   Flu viruses are constantly changing, so flu vaccines are usually updated from one season to the next. Your protection from the flu vaccine also lessens over time.   Is the flu vaccine safe?   Yes. Over the last 50 years, hundreds of millions of Americans have safely received the flu vaccines.   What are the side effects of flu vaccines?   You CANNOT get the flu from a flu vaccine. Common side effects of the flu shot include soreness, redness and/or swelling where the shot was given, low grade fever, and aches. Common side effects of the nasal spray flu vaccine for adults include runny nose, headaches, sore throat, and cough. For children, side effects include wheezing, vomiting, muscle aches, and fever.   Does the flu vaccine increase your risk of getting COVID-19?   No. There is no evidence that getting a flu vaccine increases your  risk of getting COVID-19.   Is it safe to get the flu vaccine along with a COVID-19 vaccine?   Yes. It's safe to get the flu vaccine with a COVID-19 vaccine or booster.   Contact your healthcare provider TODAY for details on when and where to get your flu vaccine.   Your provider   Your diagnosis was provided by JOE Brown, a member of your trusted care team at [facility].   If you have any questions, call us at [phone]  .   View Doctor's Note     Expires on 02/17/24

## 2024-02-03 ENCOUNTER — E-VISIT (OUTPATIENT)
Dept: FAMILY MEDICINE CLINIC | Facility: TELEHEALTH | Age: 34
End: 2024-02-03
Payer: COMMERCIAL

## 2024-02-03 PROCEDURE — BRIGHTMDVISIT: Performed by: NURSE PRACTITIONER

## 2024-02-03 NOTE — EXTERNAL PATIENT INSTRUCTIONS
View Doctor's Note     Note   I have given you a work excuse. What medications have you tried in the past that worked. I can order this for you.   Diagnosis   Migraine   My name is JOE Brown, and I'm a healthcare provider at [facility]. Based on your interview responses, I see that you have a migraine.   I've given you a doctor's note for 1 day.   About your diagnosis   Nearly 90% of all headaches can be placed into one of three categories: migraines, tension-type headaches, and cluster headaches. Common signs and symptoms of a migraine include:    Significant pain on one side of the head. However, in about 30% of migraine sufferers, pain is felt on both sides of the head.    Pain that comes on gradually. That pain may build up over several hours to several days, from dull, deep, and steady to pulsating or throbbing.    Nausea or vomiting.    Sensitivity to bright lights or noise.    Pain that feels worse with physical activity.    An aura. These are symptoms that occur before the headache begins and may include flashing lights or bright spots, strange smells, and tingling in the lips, tongue, or fingers.   Scientists and medical professionals don't really know what causes migraines. You mentioned that migraine headaches run in your family. Both genetics and environmental factors seem to play a role. Women are three times more likely to have migraines than men. For some women, their migraine attacks began or became worse during a pregnancy.   Migraines may be severe and uncomfortable at the time, but they don't cause any lasting effects.   What to expect   Most migraine headaches last a few hours. In some cases, migraines may take longer to get better, sometimes up to a few days.   When to seek care   Call us at [phone]   with any sudden or unexpected symptoms.    Your headache comes with seizures, loss of consciousness, mental confusion, trouble speaking, or vision changes.    Your  "headache is associated with a fever over 103F or a fever that lasts more than 3 days.    Your headache comes on suddenly (sometimes called a \"thunderclap\") and is extremely painful.    Your headache can be described as the \"worst headache of your life.\"    Your headache gets worse immediately after doing some intense physical activity (such as jogging, weight lifting, or having sex).    Your headache gets worse or doesn't get better, despite following your treatment plan.    You have headaches on 15 or more days per month over a 3-month period.    You have changes in the location of your headache pain, severity of pain, frequency of headache, or you develop other symptoms associated with your headache.   Other treatment   Taking headache medications on more than 3 days a week can cause \"medication-overuse\" or \"rebound\" headaches. If you need to take more medications or take medications more often, contact us to make an appointment.   People with migraine headaches often feel better if they lie down in a dark, quiet room. Non-traditional therapies can also be helpful. These therapies include acupuncture, biofeedback (which teaches you how to monitor and manage your stress), massage therapy, and cognitive behavioral therapy.   Prevention   Migraine headaches may be triggered by:    Hormonal changes due to menstruation, pregnancy, or contraceptives (such as birth control pills).    Stress.    Hunger.    Changes in sleep patterns, including both lack of sleep (fatigue) and getting too much sleep.    Weather changes.    Bright lights or other visual stimuli.    Consuming certain foods or drinks, especially those that contain the sweetener aspartame. Processed foods and alcohol, especially wine and highly carbonated beverages, may also trigger migraines.   Because there are many different migraine triggers, some people find it helpful to keep a headache diary. Keep track of when you have headaches and write down notes " about food, sleep, stress, and the weather. A headache diary can help you figure out your migraine triggers so you can try to avoid them.   The treatment plan that works best for you will be one that's tailored to you and your triggers. You may find that a triptan medication works for you. Or you may find that taking ibuprofen as soon as you feel a headache, prodrome, or aura helps your symptoms. It may take some trial and error to find the best prevention and treatment plan. Keeping a headache diary is especially helpful while you figure this out.   Your provider   Your diagnosis was provided by JOE Brown, a member of your trusted care team at [facility].   If you have any questions, call us at [phone]  .   View Doctor's Note     Expires on 03/04/24

## 2024-02-03 NOTE — E-VISIT TREATED
Chief Complaint: Migraine or headache   Patient introduction   Patient is 33-year-old female who has unilateral headache pain.   Warning. The following may warrant further investigation:    To describe how their headaches have worsened, patient writes: *I hadn't had a headache like this in a couple year*.   General presentation   Patient's headache pain is severe. Headache has lasted 4 to 72 hours. Headache reached maximum intensity in 30 to 60 minutes. Headache pain described as throbbing/pulsating. Pain is not associated with a certain position, does not wake them from sleep, and is not worse in the morning. Patient uncertain whether pain is worse with Valsalva maneuvers.   Pain is not brought on by intense physical activity. Also has nasal discharge and facial pain.   Migraine features:    Pain is accompanied by vomiting, photophobia, phonophobia, and decreased ability to perform regular daily activities.    In the 1 to 2 days leading up to headache, patient had neck stiffness.    No auditory aura, motor aura, negative aura, somatosensory aura, visual aura, or changes in speech.    No postdrome, including no feelings of being drained or exhausted, mild euphoria, or pain in the location of the previous headache with sudden head movement.    No blurry vision with neck flexion, diminished peripheral vision, double vision, severe reduction in vision, halos, or complete vision loss in one eye.   Potential triggers: visual stimuli. Reports headache coincides with menstrual cycle. Patient is not currently using hormonal birth control.   Patient has had similar headaches in the past. Patient has been diagnosed with cluster, migraine, and tension-type headaches. Patient has not had a headache every day in the past month. In the past 3 months, patient has had headaches on 1 day per month.   Family history of migraine.   Patient is not using any medications to treat their headache.   Prescription medications:   Has not  used any prescription medications for headache symptoms.   Non-prescription medications:   The following non-prescription medications were not helpful for headache symptoms:    Acetaminophen    Aspirin    Excedrin Extra Strength/Migraine (acetaminophen/aspirin/caffeine)    Excedrin PM Headache (acetaminophen/aspirin/diphenhydramine)    Excedrin Tension Headache (acetaminophen/caffeine)    Ibuprofen    Naproxen sodium   Review of red flags/alarm symptoms:    No exposure to carbon monoxide    No recent head trauma    No shingles prodrome symptoms    No unexplained fever    No chronic night sweats    No prolonged, severe fatigue    No unintentional weight loss    No confusion    No impaired alertness    No altered speech    No loss of coordination    No prolonged limb numbness    No seizures    No diaphoresis or tachycardia    No current use of blood thinning medication    No exposure to toxic chemicals/ingredients   Self-exam:    No nuchal rigidity    Able to walk on toes/heels    Able to rise from a seated position without support    Pericranial muscle tenderness in the jaw, occiput, and upper shoulder   Pregnancy/menstrual status/breastfeeding:   Not pregnant. Pre-eclampsia screening: Patient has not had a baby in last 6 weeks. Not breastfeeding. Regarding date of last menstrual period, patient writes: 3/13.   Past medical history   Immune conditions: No immunocompromising conditions.   No history of cancer.   No COPD, Lyme disease, obesity hypoventilation syndrome, or obstructive sleep apnea.   Social history   Patient does not smoke tobacco. No recreational drug use.   Current medications   Currently taking hydrOXYzine pamoate 25 MG capsule, albuterol 1.25 MG/3ML nebulizer solution, spironolactone 50 MG tablet, budesonide-formoterol 160-4.5 MCG/ACT inhaler, lamoTRIgine 25 MG tablet, and albuterol sulfate  (90 Base) MCG/ACT inhaler.   Medication allergies   No relevant drug allergies.   Medication  contraindication review   History of depression.   No history of cardiac accessory conduction pathway disorder, cerebrovascular disease, coronary vasospasm, chronic kidney disease, hepatic impairment (severe), ischemic bowel disease, ischemic heart disease, migraine (basilar or hemiplegic), PVD, sepsis, vascular surgery (recent), or WPW syndrome.   Patient requests a 1-day excuse note.   Assessment   Migraine.   This is the likely diagnosis based on patient's interview responses, including:    Throbbing or pulsating pain    Severe intensity    Presence of vomiting, photophobia, phonophobia, and decreased ability to perform regular daily activities    Duration of headache 4 to 72 hours    Family history of migraine   Plan   Medications:   No medications prescribed.   Other:   Patient was given an excuse note for 1 day.   Education:    Condition and causes    Prevention    Treatment and self-care    When to call provider, including strict return precautions if pain hasn't resolved within 24 hours   ----------   Electronically signed by JOE Brown on 2024-02-03 at 15:35PM   ----------   Patient Interview Transcript:   Where do you feel your headache pain? Select one.    On one side of my head (including forehead or back of head)   Not selected:    On both sides of my head (or all over)    I'm not sure   How long have you had this headache? Select one.    4 hours to 3 days   Not selected:    Less than 30 minutes    30 minutes to 3 hours    More than 3 days    I don't currently have a headache   How would you describe your headache? Select all that apply.    It's throbbing or pulsating   Not selected:    Band-like pressure or tightening    Dull pain    A feeling of head fullness    Pressure-like pain    It feels like I'm wearing a tight cap    None of the above   On a scale of 1 to 10, how severe is your headache pain? If you have a very severe headache, you may need to be seen in person. Select one.     Severe; intense pain that prevents me from doing some everyday activities   Not selected:    Mild; pain is noticeable and distracting, but I can do everyday activities    Moderate; strong pain that makes everyday activities harder    _Unbearable; intense pain that prevents me from doing anything _    The worst headache of my life   How long did it take for your headache to reach the greatest intensity? The pain of most headaches builds gradually. In rare cases, headache pain comes on suddenly, like a clap of thunder or an explosion, and the pain reaches greatest intensity within 1 minute. Select one.    30 to 60 minutes   Not selected:    Less than 1 minute    1 to 5 minutes    5 to 30 minutes    Longer than 1 hour   Do you currently have any of these symptoms? Select all that apply.    Vomiting    Sensitivity to light    Sensitivity to noise    Decreased ability to work, study, or do regular daily activities for at least one day   Not selected:    Nausea    Headache pain that's worse with physical activity (walking or climbing stairs)    None of the above   Are any of these true for your headache? Select all that apply.    None of the above   Not selected:    It's gotten better, but it hasn't gone away    It went away, but then it came back    It wakes me from sleep    It's worse in the morning    It's worse when I bend over    It's worse when I'm upright (and better when I'm lying down)   Do you currently have any of these symptoms? Select all that apply.    Thick yellow or green nasal drainage (mucus)    Pain around the eyes or cheeks   Not selected:    Fever    Nasal congestion    None of these   Do you currently have any of these symptoms? Select all that apply.    None of the above   Not selected:    Confusion    Difficulty staying awake or alert    Difficulty speaking or understanding speech    Difficulty walking    Numbness, tingling, or weakness in any of your limbs that doesn't go away    Seizures    " Severe jaw pain    Painful rash that looks like a band of blisters on the same side of your head or face as your headache   Headaches can be a sign of a serious underlying condition. Within the last month, have you had any of these symptoms? Select all that apply.    None of the above   Not selected:    Unexplained fever (no clear source or obvious infection)    Recurring night sweats    Severe fatigue that doesn't improve with rest    Unintentional weight loss   Have you had any of these vision changes? Select all that apply.    None of the above   Not selected:    Blurry vision when moving your chin toward your chest    Loss of peripheral vision (can't see out of the corners of the eyes)    Double vision    Severe difficulty seeing    Seeing halos (bright circles) around lights    Complete loss of vision in one eye   Sore or tight muscles in the neck and shoulders can play a part in causing headaches. Gently press against your head and neck muscles, making small circular movements on the muscles. Do this on both your right and left sides.    Jaw    Back of head    Upper shoulder   Not selected:    Forehead    Neck    None of the above   Is your headache pain worse with Valsalva maneuvers? A common Valsalva maneuver involves closing your mouth, plugging your nose shut, and breathing out gently in a way that \"pops\" your ears. Bearing down as if having a bowel movement is another example of a Valsalva maneuver. Select one.    I'm not sure   Not selected:    Yes    No   Can you move your chin toward your chest?    Yes   Not selected:    No, my neck is too stiff   We'd like you to perform three simple tasks: 1. Get up from a seated position without using your arms (or anyone's help) to push yourself up 2. Walk on your toes for 10 steps 3. Walk on your heels for 10 steps Can you perform all three tasks? Select one.    Yes   Not selected:    No, I can't do one or more of the tasks   Do any of these seem to trigger your " headache symptoms? Select all that apply.    Bright lights or visual stimuli   Not selected:    Alcohol    Artificial sweeteners (aspartame)    Certain foods, such as cured meats or cheese    Certain smells    Changes in sleep pattern    Hunger    Stress    Weather changes    No, not that I know of   Does your headache only occur during or after intense physical activity (jogging, weight lifting, or having sex)? Select one.    No   Not selected:    Yes   In the last 2 weeks, have you had any kind of injury or trauma to your head? Examples of injuries might include hitting your head or being hit in the head. Select one.    No   Not selected:    Yes   In the last month, have you had any known exposure to toxic chemicals? For example, this might include accidentally swallowing antifreeze or inhaling paint fumes. Select one.    No, not that I know of   Not selected:    Yes   Have you had any recent exposure to carbon monoxide? Common sources of carbon monoxide include motor vehicle exhaust, smoke from fires, engine fumes, and non-electric heaters. Select one.    No, not that I know of   Not selected:    Yes   Have you ever had a similar headache in the past? By similar, we mean the location of the pain is the same, the headache feels the same, and the associated symptoms are the same. Select one.    Yes   Not selected:    No   Does it feel like your headaches have gotten worse? For example, are they coming on faster, happening more often, or becoming more painful? Select one.    Yes, definitely   Not selected:    No, not necessarily    I'm not sure   How have your headaches gotten worse? Select all that apply.    Other (specify): I hadn't had a headache like this in a couple year   Not selected:    I have more frequent headaches    My headaches are coming on more quickly    My headache pain is getting more intense    My headaches are interfering more with my ability to do everyday activities   Over the past month, have  you had a headache every day? Select one.    No   Not selected:    Yes   Over the past 3 months, how many days per month have you had a headache? Select one.    1 day or less per month   Not selected:    2 to 14 days per month    15 or more days per month   How many days per week do you use medication for a headache? This includes both non-prescription and prescription medications. Select one.    I haven't used any medications   Not selected:    0 to 1 day per week    1 to 2 days per week    3 or more days per week   Have you ever seen a healthcare provider for your headaches? Select one.    Yes   Not selected:    No   When you saw the healthcare provider, was your headache diagnosed as any of these? Select all that apply.    Cluster headache    Migraine headache    Tension-type headache   Not selected:    Other (specify)   Do you have a family history of migraine headaches? Select one.    Yes   Not selected:    No, not that I know of   In the 1 to 2 days leading up to your headaches, do you have any of these symptoms? Select all that apply.    Neck stiffness   Not selected:    Constipation    Depression    Euphoria (feeling intensely excited or happy)    Food cravings    Increased yawning    Irritability    No, not that I've noticed   Just before or at the beginning of your headaches, do you have any of these symptoms? Some headaches can be associated with other sensory or physical symptoms. They usually last no longer than an hour and then go away completely. Select all that apply.    None of the above   Not selected:    Ringing in your ears or hearing other noises or music (auditory aura)    Jerking or other repetitive physical movements (motor aura)    Loss of vision, hearing, feeling, or ability to move a body part (negative aura)    Burning, pain, or tingling on any part of your body (somatosensory aura)    Seeing bright lines, shapes, or objects (visual aura)    Difficulty finding words or other changes in  "speech   Once the headache pain goes away, do you have any of these symptoms? Select all that apply.    None of the above   Not selected:    Feeling drained or exhausted    Mild euphoria (excitement or happiness)    Pain in the location of the previous headache with sudden head movement   Are your headaches associated with \"attacks\" or \"spells\" of excessive sweating or fast heart rate? Select all that apply.    No, neither of these   Not selected:    Excessive sweating    Fast heart rate   To make a treatment plan that's safe for you, we need to ask about your health history. Do you regularly take any blood thinning medications? Examples include aspirin, Coumadin (warfarin), Aggrenox (aspirin/dipyridamole), Plavix (clopidogrel), Pradaxa (dabigatran), Xarelto (rivaroxaban), Eliquis (apixaban) and Savaysa (edoxaban). Select one.    No   Not selected:    Yes   Do you smoke tobacco? Select one.    No, never   Not selected:    Yes, every day    Yes, some days    No, I quit   In the last month, have you used amphetamines, cocaine, hallucinogens, heroin, or other opioids? Your response to this question will be shared with your care provider only. Select one.    No   Not selected:    Yes   Do you have any of these conditions? Select all that apply.    None of the above   Not selected:    Chronic obstructive pulmonary disease (COPD)    Lyme disease    Obesity hypoventilation syndrome (also known as Pickwickian syndrome)    Obstructive sleep apnea   Do you have any of these conditions? Scroll to see all options. Select all that apply.    Depression   Not selected:    Aspirin- or NSAID-induced asthma or urticaria    Aspirin triad, Samter's triad, or aspirin-exacerbated respiratory disease (AERD)    Bone marrow depression    Phenothiazine-induced anemia, leukemia, lymphoma, or myeloma    Catecholamine-releasing paraganglioma    Blood clotting disorder    G6PD deficiency    Gastrointestinal (GI) bleeding    Gastrointestinal (GI) " obstruction    Uncontrolled hypertension    Parkinson's disease    Reye syndrome    Seizure disorder    None of the above   Do you have any of these conditions that can affect the immune system? Scroll to see all options. Select all that apply.    None of these   Not selected:    History of bone marrow transplant    Chronic kidney disease    Chronic liver disease (including cirrhosis)    HIV/AIDS    Inflammatory bowel disease (Crohn's disease or ulcerative colitis)    Lupus    Moderate to severe plaque psoriasis    Multiple sclerosis    Rheumatoid arthritis    Sickle cell anemia    Alpha or beta thalassemia    History of kidney, liver, heart, or other solid organ transplant    History of liver, heart, or other solid organ transplant    History of spleen removal    An autoimmune disorder not listed here (specify)    A condition requiring treatment with long-term use of oral steroids (such as prednisone, prednisolone, or dexamethasone) (specify)   Have you ever been diagnosed with cancer? Select one.    No   Not selected:    Yes, I have cancer now    Yes, but I'm in remission   Have you had any of these conditions or procedures? Scroll to see all options. Select all that apply.    None of the above   Not selected:    Cardiac accessory conduction pathway disorder-associated arrhythmia    Cerebrovascular disease (stroke or TIA)    Coronary vasospasm (angina)    Ischemic bowel disease    Ischemic heart disease (history of myocardial infarction or heart attack)    Kidney disease (chronic)    Liver disease    Metoclopramide-associated dystonic reaction    Migraine diagnosed as basilar or hemiplegic    Peripheral vascular disease (PVD)    Sepsis    Tardive dyskinesia    Vascular surgery (recent)    Faith-Parkinson-White (WPW) syndrome   Are you pregnant? Select one.    No   Not selected:    Yes   Have you had a baby within the last 6 weeks? Select one.    No   Not selected:    Yes   When was your last menstrual period? If  you don't currently have periods or no longer have periods, please briefly explain.    3/13   Do your headaches seem to be related to your menstrual cycle? For example, do you get a headache around the same time every month? Select one.    Yes   Not selected:    No    I don't currently have periods or no longer have periods    I'm not sure   Are you breastfeeding? Select one.    No   Not selected:    Yes   Have you recently made any changes to your hormonal birth control method (pill, ring, implant, injection, IUD with progesterone)? Select one.    I don't use hormonal birth control   Not selected:    Started using hormonal birth control    Stopped using hormonal birth control    Switched hormonal birth control methods    No   Have you used any non-prescription medications for your headache symptoms? Select one.    Yes   Not selected:    No   Acetaminophen (Tylenol)    Not helpful   Not selected:    Helpful   Aspirin    Not helpful   Not selected:    Helpful   Excedrin Extra Strength or Excedrin Migraine (acetaminophen/aspirin/caffeine)    Not helpful   Not selected:    Helpful   Excedrin PM Headache (acetaminophen/aspirin/diphenhydramine)    Not helpful   Not selected:    Helpful   Excedrin Tension Headache (acetaminophen/caffeine)    Not helpful   Not selected:    Helpful   Ibuprofen (Advil, Motrin)    Not helpful   Not selected:    Helpful   Naproxen sodium (Aleve)    Not helpful   Not selected:    Helpful   Have you used any prescription medications for your headache symptoms? Select one.    No   Not selected:    Yes   Are you still taking these medications listed in your medical record? If you're not taking any of these, click Next. Select all that apply.    hydrOXYzine pamoate 25 MG capsule    albuterol 1.25 MG/3ML nebulizer solution    spironolactone 50 MG tablet    budesonide-formoterol 160-4.5 MCG/ACT inhaler    lamoTRIgine 25 MG tablet    albuterol sulfate  (90 Base) MCG/ACT inhaler   Are you taking  any other medications, vitamins, or supplements? Select one.    No   Not selected:    Yes   Have you ever had an allergic or bad reaction to any medication? Select one.    Yes   Not selected:    No   Have you had an allergic or bad reaction to any of these medications? Select all that apply.    None of the above   Not selected:    Triptans, such as sumatriptan (Imitrex), zolmitriptan (Zomig), eletriptan (Relpax), naratriptan (Amerge), or rizatriptan (Maxalt)    Prochlorperazine (Compazine)    Metoclopramide (Reglan)    Medications containing naproxen (Naprosyn, Aleve)    Dihydroergotamine   Have you had an allergic or bad reaction to any of these non-prescription medications? Select all that apply.    None of the above   Not selected:    Acetaminophen (Tylenol)    Aspirin    Excedrin Extra Strength or Excedrin Migraine (acetaminophen/aspirin/caffeine)    Ibuprofen (Advil, Motrin)    Naproxen or naproxen sodium (Aleve)   Do you need a doctor's note? A doctor's note confirms that you received care today and states when you can return to school or work. It does not contain information about your diagnosis or treatment plan. Your provider will make the final decision on whether to give you a doctor's note. Doctor's notes CANNOT be backdated. Select one.    Today only (1 day)   Not selected:    Today and tomorrow (2 days)    3 days    No   Is there anything you'd like to add about your symptoms? Please limit your comments to the symptoms covered in this interview. If you include comments about other concerns, your provider may recommend that you be seen in person.   The patient did not enter any additional information.   ----------   Medical history   Medical history data does not currently exist for this patient.

## 2024-06-07 ENCOUNTER — E-VISIT (OUTPATIENT)
Dept: FAMILY MEDICINE CLINIC | Facility: TELEHEALTH | Age: 34
End: 2024-06-07
Payer: COMMERCIAL

## 2024-06-07 NOTE — EXTERNAL PATIENT INSTRUCTIONS
View Doctor's Note     Diagnosis   Urinary tract infection (UTI)   My name is JOE Steiner. I'm a healthcare provider at [facility]. After reviewing your interview, I see you have a urinary tract infection (UTI).   I've given you a doctor's note for 1 day.   Medications   Your pharmacy   [id number] [place] [facility] [id number] [phone]     Prescription   Nitrofurantoin monohydrate/macrocrystalline (100mg): Take 1 capsule by mouth every 12 hours for 5 days for infection. This medication is an antibiotic. Take it exactly as directed. You must finish the entire course of medication, even if you feel better after taking the first few doses.   Fluconazole (150mg): Take 1 tablet by mouth once for 1 day as a single dose. Use this medication only if you develop a yeast infection. If yeast infection symptoms are still present 3 days after taking the first tablet, take the second tablet.    Because you've developed yeast infections after taking antibiotics in the past, I've prescribed Diflucan (fluconazole). Diflucan is an oral antifungal that treats yeast infections. Use this medication only if you develop a yeast infection.   About your diagnosis   A UTI is an infection of one or more parts of the urinary tract, most commonly the bladder.   Most UTIs are caused by bacteria (usually E. coli) that travel up the urethra and into the bladder. I see that you have some common signs and symptoms of a UTI:    Pain or burning while urinating    Frequent urination    Sudden urge to urinate    Symptoms that feel a lot like past UTIs    Mild or moderate pain, pressure, or discomfort in your lower abdomen    Mild back pain    Strange or strong smelling urine    Symptoms that began shortly after sexual intercourse   Fortunately, most UTIs aren't serious, and they're easily treated with antibiotics. Make sure you take all of the antibiotic pills given to you, even if you start to feel better  after the first few doses. Otherwise, the UTI might come back.   What to expect   If you follow this treatment plan, you should start to feel better within 1 to 2 days.   When to seek care   Call us at [phone]   with any sudden or unexpected symptoms.    Symptoms that don't improve or get worse in the next 48 hours    Fever that goes above 101F or lasts longer than 24 hours    Shaking or chills    Nausea or vomiting    Severe flank pain (pain in your back or side) or pain that gets worse   Other treatment    Rest and drink plenty of water    Urinate frequently and when you first feel the urge    Place a heating pad on your back or stomach to help relieve some of the discomfort   Prevention    Drink a lot of liquids to help flush bacteria from your system. Water is best. Try for six to eight, 8-ounce glasses a day on a regular basis.    Urinate often and when you first feel the urge. Bacteria can grow when urine stays in the bladder too long. Urinate after sex to flush away bacteria.    After using the toilet, always wipe from front to back. This step is most important after a bowel movement. Wiping from front to back prevents bacteria normally found in stool from entering the urinary tract.   Your provider   Your diagnosis was provided by JOE Steiner, a member of your trusted care team at [facility].   If you have any questions, call us at [phone]  .   View Doctor's Note     Expires on 07/07/24

## 2024-06-07 NOTE — E-VISIT TREATED
Chief Complaint: Bladder infection (UTI)   Patient introduction   Patient is 34-year-old female. Patient provided the following organ inventory: Presence of vagina.   Patient has had dysuria, frequent urination, and urinary urgency for 4 to 6 days.   Urine is yellow with a strong or pungent odor.   General presentation   No fever. No nausea or vomiting.   Moderate abdominal or pelvic pain.   Mild back pain.   Bilateral flank pain. Difficulty starting, stopping, or delaying urination.   Has not tried acetaminophen, ibuprofen, or phenazopyridine for current symptoms.   Has not taken antibiotics for current symptoms.   Previous history of UTI. Current symptoms feel mostly the same as previous UTIs. Received treatment for UTI 0 times in last year.   Previously developed yeast infections as a result of taking antibiotics for past UTIs.   No history of pyelonephritis. No history of kidney stones.   Had sexual intercourse in the past week. Does not use diaphragm. No unprotected sexual intercourse with a new partner in the last 2 weeks.   Patient is not being treated for diabetes mellitus.   Review of red flags/alarm symptoms:    No recent hospitalizations or nursing home care (last 3 months)    No history of renal failure    No recent history of urologic instrumentation    No anatomic abnormalities of the urinary tract    No abnormal vaginal discharge    No visible vaginal sores    No pain with sexual intercourse    No abnormal vaginal bleeding or spotting   Pregnancy/menstrual status/breastfeeding:   Patient is not pregnant. Patient is not breastfeeding. Regarding date of last menstrual period, patient writes: 5/15.   Current medications   Currently taking hydrOXYzine pamoate 25 MG capsule, albuterol 1.25 MG/3ML nebulizer solution, and albuterol sulfate  (90 Base) MCG/ACT inhaler.   Medication allergies    Cephalosporins    Penicillins   Medication contraindication review   Not taking ACE inhibitors and ARBs.    No history of Darren-Danlos syndrome, folate deficiency, G6PD deficiency, high blood pressure, aortic aneurysm or dissection, Marfan syndrome, megaloblastic anemia, mononucleosis, myasthenia gravis, oliguria/anuria, or peripheral vascular disease.   No known history of amoxicillin-clavulanate-associated cholestatic jaundice or nitrofurantoin-associated cholestatic jaundice.   Past medical history   Immune conditions: No immunocompromising conditions.   No history of cancer.   Patient requests a 1-day excuse note.   Assessment   Uncomplicated acute UTI.   This is the likely diagnosis based on patient's interview responses, including:    Symptom profile    Previous history of UTI    Current symptoms are mostly the same as previous UTIs   No recent history of kidney stones.   Plan   Medications:    nitrofurantoin monohydrate/macrocrystals 100 mg capsule RX 100mg 1 cap PO q12h 5d for infection. This medication is an antibiotic. Take it exactly as directed. You must finish the entire course of medication, even if you feel better after taking the first few doses. Amount is 10 cap.    fluconazole 150 mg tablet RX 150mg 1 tab PO once 1d as a single dose for vaginal yeast infection. Repeat in 72 hours if symptoms persist. Amount is 2 tab.   The patient's prescriptions will be sent to:   [id number]   [address] [phone]   Phone: [phone]     Fax: [phone]   Other:   Patient was given an excuse note for 1 day.   Education:    Condition and causes    Prevention    Treatment and self-care    When to call provider   Follow-up:   Patient to follow up as needed for progression or lack of improvement in symptoms within 3d.   ----------   Electronically signed by JOE Steiner on 2024-06-07 at 17:56PM   ----------   Patient Interview Transcript:   Knowing about your anatomy is important for diagnosing and treating UTIs. The gender we have on file for you is female, but we realize  this might not tell the whole story. Which of these do you have? Select one.    Vagina   Not selected:    Penis   Which of these symptoms do you have? Select all that apply.    Pain or burning while urinating    Frequent urination    __Sudden urge to urinate and it's hard to hold the urine in __   How long have you had these symptoms? Select one.    4 to 6 days   Not selected:    Less than 24 hours    1 to 3 days    7 to 10 days    More than 10 days   Have you already started taking antibiotics for your current symptoms? Select one.    No   Not selected:    Yes   Since your current symptoms started, has it been difficult to start, stop, or delay urination? Select one.    Yes   Not selected:    No   What color is your urine? Select one.    Yellow   Not selected:    Clear    Cloudy    Pink or red   Does your urine smell strange (like ammonia) or stronger than usual? Select one.    Yes   Not selected:    No   Do you also have any of these symptoms? Select all that apply.    Pain, pressure, or discomfort in the lower abdomen    Back pain   Not selected:    Fever    Nausea    Vomiting    No   How would you describe your lower abdominal pain, pressure, or discomfort? Select one.    Moderate; it's uncomfortable and gets in the way of doing daily tasks   Not selected:    Mild; I only notice it when I pay attention to it    Severe; I can't get comfortable, and it stops me from doing daily tasks   How would you describe your back pain? Select one.    Mild, achy pain   Not selected:    Moderate pain that gets in the way of my daily tasks    Severe, sharp pain that keeps me from my daily tasks   Do you have any flank pain? The flank is the side of the body between the ribs and the hips.    Yes, in both my left and right flanks   Not selected:    Yes, in my left flank    Yes, in my right flank    No   Do you have any of these vaginal symptoms? Select all that apply.    No   Not selected:    Abnormal vaginal itching     Unscheduled or abnormal vaginal bleeding or spotting    Pain during sex    Visible sores on the vagina    Abnormal vaginal discharge   In the past 2 weeks, have you had a medical device or instrument placed in your urinary tract? Examples include catheters, stents, and nephrostomy tubes. Select one.    No   Not selected:    Yes   Have you recently been hospitalized or been a resident of a nursing home or other long-term care facility? This doesn't include emergency room (ER) visits. Select one.    No   Not selected:    Yes, within the last 2 weeks    Yes, within the last 3 months   Kidney failure    No   Not selected:    Within the last year    More than a year ago   Kidney infection (pyelonephritis)    No   Not selected:    Within the last year    More than a year ago   Kidney stones    No   Not selected:    Within the last year    More than a year ago   Kidney transplant    No   Not selected:    Within the last year    More than a year ago   Have you ever been diagnosed with any of these? Select all that apply.    No   Not selected:    Urinary reflux    Bladder diverticula    Single (or horseshoe) kidney    Duplicated urethra   Have you recently held your urine for a long time after you felt the urge to go? Select one.    No   Not selected:    Yes   Have you recently avoided eating or drinking so you wouldn't have the urge to urinate as often? Select one.    No   Not selected:    Yes   Do you use a diaphragm? Select one.    No   Not selected:    Yes   Are you pregnant? Select one.    No   Not selected:    Yes   When was your last menstrual period? If you don't currently have periods or no longer have periods, please briefly explain.    5/15   Are you breastfeeding? Select one.    No   Not selected:    Yes   Have you had sexual intercourse in the past week? Recent sexual intercourse is a risk factor for urinary tract infections. Select one.    Yes   Not selected:    No   Have you had unprotected sexual intercourse  with a new partner in the last 2 weeks? Select one.    No   Not selected:    Yes   Have you traveled to any of these countries within the last 3 months? Recent travel to these countries may affect which medication we recommend for your symptoms. Select all that apply.    None of these   Not selected:    [place]    [place]    [place]    [place]   Have you ever had a urinary tract infection (UTI)? A UTI is often called a bladder infection or acute cystitis. Select one.    Yes   Not selected:    No, not that I know of   How much do your current symptoms feel like past UTIs? Select one.    Mostly the same   Not selected:    Exactly the same    Somewhat the same    Totally different   In the past year, how many times have you taken antibiotics for a UTI? Select one.    0   Not selected:    1 to 3    4 or more   Have you ever developed a yeast infection as a result of taking antibiotics? Select one.    Yes   Not selected:    No, not that I know of   UTIs may be more serious when other factors are present. Let's address those now. Are you being treated for type 1 or type 2 diabetes? Select one.    No   Not selected:    Yes   Do you have any of these conditions that can affect the immune system? Scroll to see all options. Select all that apply.    None of these   Not selected:    History of bone marrow transplant    Chronic kidney disease    Chronic liver disease (including cirrhosis)    HIV/AIDS    Inflammatory bowel disease (Crohn's disease or ulcerative colitis)    Lupus    Moderate to severe plaque psoriasis    Multiple sclerosis    Rheumatoid arthritis    Sickle cell anemia    Alpha or beta thalassemia    History of kidney, liver, heart, or other solid organ transplant    History of liver, heart, or other solid organ transplant    History of spleen removal    An autoimmune disorder not listed here (specify)    A condition requiring treatment with long-term use of oral steroids (such as prednisone, prednisolone, or  "dexamethasone) (specify)   Have you ever been diagnosed with cancer? Select one.    No   Not selected:    Yes, I have cancer now    Yes, but I'm in remission   These last few questions will help us create the right treatment plan for you. Are you being treated for any of these conditions? Select all that apply.    No   Not selected:    Darren-Danlos syndrome    Folate deficiency    G6PD deficiency    High blood pressure    History of aortic aneurysm or dissection    Marfan syndrome    Megaloblastic anemia    Mono (mononucleosis)    Myasthenia gravis    Oliguria or anuria    Peripheral vascular disease   Have you ever had jaundice as a result of taking amoxicillin-clavulanate (Augmentin) or nitrofurantoin (Macrobid)? Select all that apply.    No   Not selected:    Yes, from amoxicillin-clavulanate (Augmentin)    Yes, from nitrofurantoin (Macrobid, Macrodantin)   Are you taking any of these medications? Select all that apply.    No   Not selected:    An ACE inhibitor such as lisinopril, enalapril, captopril, or benazepril    An angiotensin II receptor blocker (ARB) such as candesartan, irbesartan, losartan, or valsartan   Are you still taking these medications listed in your medical record? If you're not taking any of these, click Next. Select all that apply.    hydrOXYzine pamoate 25 MG capsule    albuterol 1.25 MG/3ML nebulizer solution    albuterol sulfate  (90 Base) MCG/ACT inhaler   Are you taking any other medications, vitamins, or supplements? Select one.    No   Not selected:    Yes   Have you ever had an allergic or bad reaction to any medication? Select one.    Yes   Not selected:    No   Have you had an allergic or bad reaction to any of these medications? Select all that apply.    Any antibiotic starting with \"cef-,\" such as cefazolin, cefdinir, cefuroxime, ceftriaxone, ceftazidime, cefepime, or cephalexin (brands include Fortaz and Keflex)    Any antibiotic ending with \"-cillin,\" such as " "penicillin, amoxicillin, ampicillin, dicloxacillin, nafcillin, or piperacillin (brands include Augmentin, Unasyn, and Zosyn)   Not selected:    Any antibiotic ending with \"-floxacin,\" such as ciprofloxacin, gemifloxacin, levofloxacin, moxifloxacin, or ofloxacin (brands include Cipro, Factive, Floxin, and Levaquin)    Nitrofurantoin (brands include Furadantin, Macrobid and Macrodantin)    Sulfamethoxazole-trimethoprim (brands include Bactrim and Septra) or any other sulfa drug    Fluconazole (brand name Diflucan), itraconazole, or terconazole    Trimethoprim (brand name Primsol)    No, not that I know of   Have you had an allergic or bad reaction to any of these medications? Select all that apply.    No   Not selected:    Acetaminophen (Tylenol)    Ibuprofen (Advil, Midol, Motrin)    Phenazopyridine (Azo, Baridium, Pyridium, Uricalm, Uristat)   Do you need a doctor's note? A doctor's note confirms that you received care today and states when you can return to school or work. It does not contain information about your diagnosis or treatment plan. Your provider will make the final decision on whether to give you a doctor's note. Doctor's notes CANNOT be backdated. Select one.    Today only (1 day)   Not selected:    Today and tomorrow (2 days)    3 days    No   Is there anything you'd like to add about your symptoms? Please limit your comments to the symptoms covered in this interview. If you include comments about other concerns, your provider may recommend that you be seen in person.   The patient did not enter any additional information.   ----------   Medical history   The following information was received from the EMR on June 07, 2024.   Allergies:    CEFUROXIME AXETIL   - Allergy Type: Medication   - Reaction: Other (See Comments)   - Severity: High   - Clinical Status: Active   - Verification Status: Confirmed    AMOXICILLIN-POT CLAVULANATE   - Allergy Type: Medication   - Reaction: Nausea And Vomiting   " - Severity: High   - Clinical Status: Active   - Verification Status: Confirmed    CEFACLOR   - Allergy Type: Medication   - Reaction: Other (See Comments)   - Severity: High   - Clinical Status: Active   - Verification Status: Confirmed    CEFIXIME   - Allergy Type: Medication   - Reaction: Nausea And Vomiting, Shortness Of Breath   - Severity: High   - Clinical Status: Active   - Verification Status: Confirmed    CEFPROZIL   - Allergy Type: Medication   - Reaction: Shortness Of Breath   - Severity: High   - Clinical Status: Active   - Verification Status: Confirmed    CEPHALEXIN   - Allergy Type: Medication   - Reaction: Shortness Of Breath   - Severity: High   - Clinical Status: Active   - Verification Status: Confirmed    CEPHALOSPORINS   - Allergy Type: Medication   - Reaction: Shortness Of Breath   - Severity: High   - Clinical Status: Active   - Verification Status: Confirmed    LATEX   - Allergy Type: Medication   - Reaction: Shortness Of Breath   - Severity: High   - Clinical Status: Active   - Verification Status: Confirmed    PROMETHAZINE HCL   - Allergy Type: Medication   - Reaction: Other (See Comments)   - Severity: High   - Clinical Status: Active   - Verification Status: Confirmed    LAMICTAL [LAMOTRIGINE]   - Allergy Type: Medication   - Reaction: Rash   - Severity: High   - Clinical Status: Active   - Verification Status: Confirmed    OTHER   - Allergy Type:   - Reaction: Other (See Comments)   - Severity: High   - Clinical Status: Active   - Verification Status: Confirmed   Medications:    fluticasone (FLONASE) nasal spray   - Route: Nasal   - Start Date: August 24, 2022   - End Date: None   - Status: Active    hydrOXYzine pamoate (VISTARIL) capsule   - Route:   - Start Date: March 20, 2023   - End Date: None   - Status: Active    ondansetron (ZOFRAN-ODT) disintegrating tablet   - Route: Translingual   - Start Date: October 18, 2023   - End Date: None   - Status: Active     brompheniramine-pseudoephedrine-DM syrup 2-30 mg/10mL   - Route:   - Start Date: October 18, 2023   - End Date: None   - Status: Active    levocetirizine (XYZAL) tablet 5 mg   - Route: Oral   - Start Date: February 17, 2020   - End Date: None   - Status: Active    albuterol (PROVENTIL) nebulizer solution 0.042% 1.25 mg/3mL   - Route: Nebulization   - Start Date: February 17, 2021   - End Date: None   - Status: Active    spironolactone (ALDACTONE) tablet   - Route: Oral   - Start Date: March 20, 2023   - End Date: None   - Status: Active    budesonide-formoterol (SYMBICORT) inhaler 160-4.5 mcg/puff   - Route: Inhalation   - Start Date: March 20, 2023   - End Date: None   - Status: Active    cariprazine (VRAYLAR) capsule   - Route: Oral   - Start Date: July 05, 2022   - End Date: None   - Status: Active    lamoTRIgine (laMICtal) tablet   - Route: Oral   - Start Date: September 06, 2022   - End Date: None   - Status: Active    albuterol (PROVENTIL HFA;VENTOLIN HFA;PROAIR HFA) inhaler   - Route: Inhalation   - Start Date: March 20, 2023   - End Date: None   - Status: Active   Problem list:    Abnormal weight gain   - Category: Problem List Item   - Health Status:   - Start Date: February 04, 2021   - End Date: None   - Status: Inactive    Allergic state   - Category: Problem List Item   - Health Status:   - Start Date: February 04, 2021   - End Date: None   - Status: Active    Anemia   - Category: Problem List Item   - Health Status:   - Start Date: February 04, 2021   - End Date: None   - Status: Active    Anovulation   - Category: Problem List Item   - Health Status:   - Start Date: February 04, 2021   - End Date: None   - Status: Active    Asthma exacerbation   - Category: Problem List Item   - Health Status:   - Start Date: February 04, 2021   - End Date: November 30, 2021   - Status: Resolved    Moderate persistent asthma without complication   - Category: Problem List Item   - Health Status:   - Start Date:  February 04, 2021   - End Date: None   - Status: Active    Body mass index (BMI) of 50-59.9 in adult   - Category: Problem List Item   - Health Status:   - Start Date: February 04, 2021   - End Date: None   - Status: Active    Chronic pulmonary embolism   - Category: Problem List Item   - Health Status:   - Start Date: February 04, 2021   - End Date: None   - Status: Active    Diarrhea   - Category: Problem List Item   - Health Status:   - Start Date: February 04, 2021   - End Date: November 30, 2021   - Status: Resolved    DUB (dysfunctional uterine bleeding)   - Category: Problem List Item   - Health Status:   - Start Date: February 04, 2021   - End Date: None   - Status: Active    Dysphagia   - Category: Problem List Item   - Health Status:   - Start Date: February 04, 2021   - End Date: November 30, 2021   - Status: Resolved    Fertility testing   - Category: Problem List Item   - Health Status:   - Start Date: February 04, 2021   - End Date: None   - Status: Active    Encounter for screening for other disorder   - Category: Problem List Item   - Health Status:   - Start Date: February 04, 2021   - End Date: None   - Status: Inactive    Headache, migraine   - Category: Problem List Item   - Health Status:   - Start Date: February 04, 2021   - End Date: None   - Status: Active    Memory loss   - Category: Problem List Item   - Health Status:   - Start Date: February 04, 2021   - End Date: None   - Status: Active    Bipolar depression   - Category: Problem List Item   - Health Status:   - Start Date: February 04, 2021   - End Date: None   - Status: Active    MRSA infection   - Category: Problem List Item   - Health Status:   - Start Date: February 04, 2021   - End Date: None   - Status: Active    Nausea and vomiting   - Category: Problem List Item   - Health Status:   - Start Date: February 04, 2021   - End Date: November 30, 2021   - Status: Resolved    Neck pain   - Category: Problem List Item   - Health  Status:   - Start Date: February 04, 2021   - End Date: None   - Status: Active    Morbid (severe) obesity due to excess calories   - Category: Problem List Item   - Health Status:   - Start Date: February 04, 2021   - End Date: None   - Status: Active    Other specified sites of sprains and strains   - Category: Problem List Item   - Health Status:   - Start Date: February 04, 2021   - End Date: None   - Status: Active    Snoring   - Category: Problem List Item   - Health Status:   - Start Date: February 04, 2021   - End Date: None   - Status: Active    Tinea pedis   - Category: Problem List Item   - Health Status:   - Start Date: February 04, 2021   - End Date: None   - Status: Active    Urinary tract infection   - Category: Problem List Item   - Health Status:   - Start Date: February 04, 2021   - End Date: November 30, 2021   - Status: Resolved    PCOS (polycystic ovarian syndrome)   - Category: Problem List Item   - Health Status:   - Start Date: November 30, 2021   - End Date: None   - Status: Active    Exposure to COVID-19 virus   - Category: Problem List Item   - Health Status:   - Start Date: January 02, 2022   - End Date: None   - Status: Active    Bacterial sinusitis   - Category: Problem List Item   - Health Status:   - Start Date: August 24, 2022   - End Date: None   - Status: Active

## 2024-06-08 ENCOUNTER — E-VISIT (OUTPATIENT)
Dept: FAMILY MEDICINE CLINIC | Facility: TELEHEALTH | Age: 34
End: 2024-06-08
Payer: COMMERCIAL

## 2024-06-08 NOTE — E-VISIT ESCALATED
Patient escalated   Provider JOE Parks chose to escalate patient to another level of care because: Insufficient information to diagnose   Patient was sent the following message:   Based on the information you've provided us, you need to take another step to get care.   What to do now:   Urgent Care   You should be seen within the next 24 hours.   Please go to a walk-in clinic or urgent care, or make an appointment to be seen within the next 24 hours.   You won't be charged for your eVisit. If you paid with a credit card, the charge will be reversed.   Chief Complaint: Ear pain   Patient introduction   Patient is 34-year-old female who has an ache, pressure, and throbbing pain in their left ear.   Warning. The following may warrant further investigation:    Nuchal rigidity without headache or fever   General presentation   Patient first noticed symptoms 4 to 5 days ago. They came on gradually. Symptoms come and go. Patient rates their pain as moderate (4 to 6/10). No fever.   No symptoms of tinnitus. No hearing loss. The usual amount of drainage.   No history of PE tubes.   Ear is not red, swollen, warm to the touch, or painful to the touch.   Patient also has:    Respiratory symptoms, including postnasal drip.    Nasal/sinus symptoms for less than 48 hours. Patient's nasal/sinus symptoms have improved.    Mild dizziness that does not affect patient's ability to stand or walk.   Patient swam or got water in their ears in the last 7 days. Patient regularly uses cotton swabs.   No recent airplane travel, scuba diving, hiking or driving in the mountains, or exposure to a loud blast or explosion. No recent exposure to tobacco smoke, smoke from a fire or wood-burning stove, or air pollution.   Last antibiotic treatment for an ear infection was more than 1 year ago.   Review of alarm symptoms/red flags:    No current treatment by ENT    No current PE tubes in affected ear(s)    No mastoid or ear surgery in the  last 5 years    No sharp or pointed object in ear canal    No foreign body in ear    No recent head trauma    No vision changes    No symptoms suggesting mastoiditis    No symptoms of periauricular cellulitis or perichondritis    No severe dizziness or vertigo    No hearing loss in both ears   Pregnancy/menstrual status/breastfeeding:   Not pregnant. Not breastfeeding. Regarding date of last menstrual period, patient writes: 5/15.   Self-exam:    Difficulty moving their chin toward their chest    Symptoms including mastoid pain or tenderness    Pain is unchanged by chewing or moving the jaw    Pain is unaffected by manipulation of the pinna and/or pressure on the tragus    Pain is unchanged when lying down   Current medications   Currently taking albuterol 1.25 MG/3ML nebulizer solution and albuterol sulfate  (90 Base) MCG/ACT inhaler.   Medication allergies    Cephalosporins    Penicillins   Medication contraindication review   No history of arrhythmia, congestive heart failure, recent myocardial infarction, heart block, heart disease, hypertension, hyperthyroidism, mononucleosis, or myasthenia gravis.   No known history of amoxicillin-clavulanate-associated jaundice or hepatic impairment.   No known history of azithromycin-associated cholestatic jaundice or hepatic impairment.   History of NSAID-induced asthma/urticaria. Therefore, the following medications will not be prescribed:    Ibuprofen    Naproxen   Past medical history   Immune conditions: No immunocompromising conditions.   No history of cancer.   Patient-submitted comments   Patient was asked if they had anything to add about their symptoms. Patient writes: I currently have a UTI that I'm taking meds for uti -nitrofurantoin mono/mac 100 MG every 12 hours.   Patient requests a 1-day excuse note.   Assessment:   Patient determined to need a level of care not appropriate to be delivered through eVisit.   Plan:   Patient informed of need to seek  in-person care   ----------   Electronically signed by JOE Parks on 2024-06-08 at 13:37PM   ----------   Patient Interview Transcript:   How would you describe your ear pain or discomfort? Select all that apply.    Achy    Pressure    Throbbing   Not selected:    Itchy    Blocked or plugged    Full    Crackling or popping    Shooting/stabbing/sharp   On a scale of 1 to 10, how severe is your ear pain? Select one.    Moderate (4 to 6); it's pretty uncomfortable   Not selected:    Mild (1 to 3); it bothers me a little bit    Moderate to severe (7 to 9); it's intense    Very severe; it's unbearable (10+)   Which ear is affected? Select one.    My left ear   Not selected:    My right ear    Both of my ears   When did you first notice this ear pain or discomfort? Select one.    4 to 5 days ago   Not selected:    Today    Yesterday    2 to 3 days ago    More than 5 days ago   Did your ear pain or discomfort come on suddenly or over time? Select one.    Over time   Not selected:    Suddenly   Is the outside of the affected ear red, swollen, warm to the touch, or painful to the touch? Select all that apply.    None of these   Not selected:    Red    Swollen    Warm to the touch    Painful to the touch   Is your ear pain or discomfort always there, or does it come and go? Select one.    Comes and goes   Not selected:    Always there   Does the pain or discomfort get worse when you bite or chew? Select one.    No   Not selected:    Yes   Along with your ear symptoms, have you had a fever or felt hot? Select one.    No   Not selected:    Yes   Do you have any of these on the same side as your affected ear?    Pain or tenderness over the bone behind your ear   Not selected:    Redness over the bone behind your ear    Warmth over the bone behind your ear    None of the above   Do your symptoms include the sudden onset of ringing in one or both ears? Select one.    No   Not selected:    Yes   Do your symptoms include  hearing loss? Select one.    No   Not selected:    Yes, I can't hear as well as I usually do    Yes, I can't hear at all in either ear   Has there been fluid draining out of either ear? Select one.    Yes, but no more than usual   Not selected:    Yes, and this is new or more than the usual amount    No   Along with your ear symptoms, have you had any of these cold symptoms? Select all that apply.    Postnasal drip   Not selected:    Runny nose    Stuffy nose (nasal congestion)    Sinus pain or pressure    Cough    Scratchy or sore throat    None of the above   How long have you had these nasal or sinus symptoms? Select one.    Less than 48 hours   Not selected:    3 to 5 days    6 to 9 days    10 to 14 days    2 to 4 weeks    1 month or longer   Have your nasal or sinus symptoms improved at all since they began? Select one.    Yes, but they haven't gone away completely   Not selected:    Yes, but then they came back worse than before    No   Along with your ear symptoms, do you have any of these throat or digestion symptoms? Select all that apply.    None of these   Not selected:    Burning feeling in your chest (heartburn)    Swallowed food or liquids getting stuck and coming back up    Feeling like there's a lump in your throat    Hoarse voice    Difficulty swallowing   Along with your ear symptoms, have you had a headache? Select one.    No   Not selected:    Yes   Have you had any of these vision changes? Select all that apply.    None of these   Not selected:    Blurry vision    Double vision    I can't see at all from one or both eyes   Along with your ear symptoms, have you felt dizzy or had vertigo? This includes feeling like you're spinning, swaying, or tilting; feeling light-headed; or feeling like the room is moving around you. Select one.    Yes   Not selected:    No   Has your dizziness or vertigo been so severe that you can't walk without assistance? Select one.    No   Not selected:    Yes   Do your  symptoms include vomiting? Select one.    No   Not selected:    Yes   Can you move your chin toward your chest? Select one.    No, my neck is too stiff   Not selected:    Yes   Does your ear pain or discomfort get worse if you do either of these: 1. Pull the cartilage part of your ear up and back 2. Push on your tragus (the flesh in front of your ear opening)    No   Not selected:    Yes   Take a moment and lie down. Does your ear pain or discomfort feel worse when you lie down? Select one.    No   Not selected:    Yes   Right before your symptoms started, did you put anything sharp or pointed into your ear canal? Select one.    No   Not selected:    Yes   Is it possible that you have something stuck in your ear canal? Examples include a bead, button, rock, or part of an earbud. Select one.    No   Not selected:    Yes   Right before your ear pain began, did you have a severe head or ear injury? Examples include: - A blow to your head or ear - Hitting your head or ear on a hard surface - Falling on your ear while skateboarding or skiing - A motor vehicle accident - A sports injury, such as in wrestling - Penetrating trauma, such as a knife wound Select one.    No   Not selected:    Yes   In the week before your symptoms started, did any of these apply to you? Select all that apply.    None of the above   Not selected:    Air travel    Scuba diving    Hiking or driving in the mountains    Exposed to a loud blast or explosion   In the few weeks before your symptoms started, did you go swimming or get water in one or both ears? Select one.    Yes   Not selected:    No   Have you recently been exposed to more smoke or air pollution than usual? Select all that apply.    None of the above   Not selected:    Yes, tobacco smoke    Yes, smoke from a fire or wood-burning stove    Yes, air pollution   Do you regularly use any of these items? Select all that apply.    Cotton swabs to clean your ears   Not selected:    Hearing  aids    Earbuds or in-ear headphones    Swim caps    Earwax removal devices, such as an irrigation kit    None of the above   Are you being treated by an Ear, Nose and Throat (ENT) doctor for an ear condition? An Ear, Nose, and Throat doctor is also known as an otolaryngologist. Select one.    No   Not selected:    Yes   Do you have ear tubes? Some other names for ear tubes are tympanostomy tubes, ventilation tubes, or pressure equalization (PE) tubes. Select one.    No   Not selected:    Yes, in my left ear only    Yes, in my right ear only    Yes, in both ears    No, but I've had them in the past   In the past 5 years, have you had mastoid surgery or ear surgery (not including ear tube placement or removal)? Select one.    No   Not selected:    Yes   When was the last time you were treated with antibiotics for an ear infection? This includes both oral antibiotics and antibiotic ear drops. Select one.    More than a year ago   Not selected:    Never    Within the last month    1 to 3 months ago    4 months to a year ago    I'm not sure   Do you have any of these conditions that can affect the immune system? Scroll to see all options. Select all that apply.    None of these   Not selected:    History of bone marrow transplant    Chronic kidney disease    Chronic liver disease (including cirrhosis)    HIV/AIDS    Inflammatory bowel disease (Crohn's disease or ulcerative colitis)    Lupus    Moderate to severe plaque psoriasis    Multiple sclerosis    Rheumatoid arthritis    Sickle cell anemia    Alpha or beta thalassemia    History of kidney, liver, heart, or other solid organ transplant    History of liver, heart, or other solid organ transplant    History of spleen removal    An autoimmune disorder not listed here (specify)    A condition requiring treatment with long-term use of oral steroids (such as prednisone, prednisolone, or dexamethasone) (specify)   Have you ever been diagnosed with cancer? Select one.     No   Not selected:    Yes, I have cancer now    Yes, but I'm in remission   Are you pregnant? Select one.    No   Not selected:    Yes   When was your last menstrual period? If you don't currently have periods or no longer have periods, please briefly explain.    5/15   Are you breastfeeding? Select one.    No   Not selected:    Yes   The next few questions help us recommend the best treatment for you. Have you used any of these non-prescription medications for your ear symptoms? Select all that apply.    None of these   Not selected:    Acetaminophen (Tylenol)    Carbamide peroxide otic (Auro, Debrox)    Cetirizine (Zyrtec)    Diphenhydramine (Benadryl)    Fexofenadine (Allegra)    Fluticasone (Flonase)    Ibuprofen (Advil, Motrin, Midol)    Naproxen (Aleve)    Isopropyl alcohol in glycerin ear drops (Swim Ear drops)    Loratadine (Alavert, Claritin)    Oxymetazoline (Afrin)    Pseudoephedrine (Sudafed)    Triamcinolone (Nasacort)   Are you still taking these medications listed in your medical record? If you're not taking any of these, click Next. Select all that apply.    albuterol 1.25 MG/3ML nebulizer solution    albuterol sulfate  (90 Base) MCG/ACT inhaler   Are you taking any other medications, vitamins, or supplements? Select one.    No   Not selected:    Yes   Have you ever had an allergic or bad reaction to any medication? Select one.    Yes   Not selected:    No   Have you had an allergic or bad reaction to any of these medications? Select all that apply.    Cephalexin, cefazolin, cefdinir, cefuroxime, ceftriaxone, ceftazidime, or cefepime (Ancef, Ceftin, Fortaz, Keflex, Maxipime, Rocephin)    Penicillin, amoxicillin, ampicillin, dicloxacillin, nafcillin, or piperacillin (Augmentin, Unasyn, Zosyn)   Not selected:    Corticosteroid medications, such as betamethasone, budesonide, dexamethasone, fluticasone, flunisolide, hydrocortisone, methylprednisolone, mometasone, prednisone, or prednisolone,  triamcinolone (AeroBid, Advair, Aerospan, Asmanex, Flovent, Flonase, Nasocort, Pulmicort)    Cyclobenzaprine (Flexeril)    Ciprofloxacin, levofloxacin, moxifloxacin, or ofloxacin (Cipro, Floxin, Levaquin)    Azithromycin, clarithromycin, or erythromycin (Biaxin, Erythrocin, Pediazole, Zithromax)    Naproxen (Aleve)    Doxycycline, minocycline, or tetracycline (Declomycin, Dynacin, Panmycin)    Fluconazole, itraconazole, or terconazole (Diflucan, Sporanox, Terazol)    None of these   Have you had an allergic or bad reaction to benzonatate (Tessalon Perles)? Select one.    No   Not selected:    Yes   Have you had an allergic or bad reaction to any of these non-prescription medications? Scroll to see all options. Select all that apply.    None of these   Not selected:    Acetaminophen (Tylenol)    Carbamide peroxide (Auro, Debrox)    Cetirizine (Zyrtec)    Dextromethorphan (Delsym, Robitussin 12 Hour Cough Relief)    Diphenhydramine (Benadryl)    Fexofenadine (Allegra)    Guaifenesin (Mucinex)    Guaifenesin/dextromethorphan (Mucinex DM, Robitussin DM)    Ibuprofen (Advil, Motrin, Midol)    Isopropyl alcohol in glycerin ear drops (Swim Ear drops)    Loratadine (Alavert, Claritin)    Omeprazole (Prilosec)    Oxymetazoline (Afrin, Zicam Extreme Congestion Relief)    Pantoprazole (Protonix)    Pseudoephedrine (Sudafed, SudoGest, Wal-Phed D)   Are you currently being treated for any of these conditions? Select all that apply.    None of the above   Not selected:    Arrhythmia    Congestive heart failure    Recent heart attack    Heart block    Blockage or narrowing of the blood vessels of the heart    Hypertension    Hyperthyroidism    Mononucleosis    Myasthenia gravis   Amoxicillin-clavulanate (Augmentin)    No   Not selected:    Jaundice    Liver problems   Azithromycin (Zithromax, Zmax)    No   Not selected:    Jaundice    Liver problems   Do you have any of these conditions? Select all that apply.    Asthma or hives  from taking aspirin or other NSAIDs, such as ibuprofen or naproxen   Not selected:    Aspirin triad (also known as Samter's triad or ASA triad)    Bowel obstruction    Difficulty urinating or completely emptying your bladder    Electrolyte abnormalities    Recent coronary artery bypass graft (CABG) surgery    None of the above   Have you taken any monoamine oxidase inhibitor (MAOI) medications within the last 14 days? Examples include rasagiline (Azilect), selegiline (Eldepryl, Zelapar), isocarboxazid (Marplan), phenelzine (Nardil), and tranylcypromine (Parnate). Select one.    No   Not selected:    Yes   Do you need a doctor's note? A doctor's note confirms that you received care today and states when you can return to school or work. It does not contain information about your diagnosis or treatment plan. Your provider will make the final decision on whether to give you a doctor's note. Doctor's notes CANNOT be backdated. Select one.    Today only (1 day)   Not selected:    Today and tomorrow (2 days)    3 days    No   Is there anything you'd like to add about your symptoms? Please limit your comments to the symptoms covered in this interview. If you include comments about other concerns, your provider may recommend that you be seen in person.    I currently have a UTI that I'm taking meds for uti -nitrofurantoin mono/mac 100 MG every 12 hours   ----------   Medical history   Medical history data does not currently exist for this patient.

## 2024-11-05 ENCOUNTER — E-VISIT (OUTPATIENT)
Dept: FAMILY MEDICINE CLINIC | Facility: TELEHEALTH | Age: 34
End: 2024-11-05

## 2024-11-05 PROCEDURE — FABRICHEALTHVISIT: Performed by: NURSE PRACTITIONER

## 2024-11-05 NOTE — E-VISIT TREATED
Date: 2024 13:51:28  Clinician: Karen Rodriguez  Clinician NPI: [id number]  Patient: Karma Steen  Patient : 1990  Patient Address[place] [address] [place] [id number]  Patient Phone: [phone]  Visit Protocol: Asthma refill  Patient Summary:  Karma is a 34 year old ( : 1990 ) female who initiated a visit for asthma medication refill.  Karma is requesting the following medication refill (free text):  Symbicort - 160/4.5 2 puffs twice a day. Albuterol blue   ventolin inhaler 2 puffs every 4 to 6 hours as needed.   Karma has uploaded images of the prescription.   Karma has had a provider previously prescribe an asthma medication. Karma last saw a [facility] provider for asthma 0-6 months ago.     Karma started taking the medication more than 6 months ago.   Asthma Control Test (ACT) results  The score for Karma is 21 out of 25.  The Asthma Control Test (ACT) is scored so that a higher score indicates better asthma control. Each ACT item   response is scaled from a 1 to 5. A score of 5 indicating poorly controlled asthma and a score of 25 indicating well-controlled asthma. The interview questions and responses are as follows:       In the past 4 weeks, how much of the time did your asthma keep you from getting as much done at work, school or at home?  None of the time (5)       During the past 4 weeks, how often have you had shortness of breath?  Once or twice a week (4)       During the past 4 weeks, how often did your asthma symptoms (wheezing, coughing, shortness of breath, chest tightness, or pain) wake you up at night or earlier than usual in the morning?  Not at all (5)       During the past 4 weeks, how often have you used your rescue inhaler or nebulizer medication (such as albuterol)?  2 or 3 times per week (3)       How would you rate your asthma control in the past 4 weeks?  Well controlled (4)          Symptom Details     Experiences asthma symptoms less  than 3 days per week     Karma denies experiencing an asthma attack or worsened symptoms since the last asthma prescription     Karma denies wheezing and feeling feverish.      Pertinent Medical History  Karma denies experiencing an asthma attack or worsened symptoms since the last asthma prescription.   Karma denies pregnancy and denies breastfeeding.   Karma does not smoke or use smokeless tobacco.     MEDICATIONS: ipratropium-albuterol inhalation, albuterol sulfate inhalation, budesonide-formoterol inhalation, fluticasone propionate nasal, hydroxyzine pamoate oral, ipratropium-albuterol inhalation, ALLERGIES: lamotrigine, latex, cefdinir, cephalexin,   cefprozil, cefixime, cefaclor  Clinician Response:  Dear Karma,  Based on the information you provided, I am able to refill your asthma prescription.  Please read the full treatment plan and see my recommendations below.   Medication Information  I am prescribing:      Albuterol sulfate (Ventolin HFA) 90 mcg/actuation aerosol inhaler. Inhale 2 puffs every 4-6 hours as needed (maximum of 12 puffs/day). There are no refills with this prescription.   Self Care  Be sure to avoid triggers as much as possible, and   pre-medicate by using your inhaler if trigger exposure is unavoidable. See [employer]  for more information on asthma triggers, symptoms, and prevention.   If you find that you are consistently using your short-acting asthma medication   more often than twice a week, please see your primary care provider to discuss your asthma and to see if other treatment options would provide better control of your symptoms.   When to seek care  Please be seen in a clinic or urgent care if any of the   following occur:     Your symptoms are not improving within 48 hours of starting to use your inhaler    Your symptoms are rapidly worsening and are not responding to your inhaler.     Go to the nearest emergency department or call 911 if  any of the following symptoms occur:     You have trouble talking due to shortness of breath    Your lips or fingernails are bluish in color     Additional treatment plan   Use inhalers as directed  Follow-up with primary care provider for further refills and treatment plan    Diagnosis: Unspecified asthma, uncomplicated  Diagnosis ICD: J45.909    Follow up instructions: ATTENTION: If you have been prescribed medications, your prescriptions will not be sent until you choose your pharmacy.  To do so open the link within your notification, or go to [facility] and click eVisit in the menu to open your   treatment plan. From there, you can select your pharmacy at the bottom of your after visit summary. You can also go to [email or web address]  Prescriptions  Prescription: budesonide-formoterol (Symbicort) 160-4.5 mcg/actuation inhalation HFA aerosol inhaler, inhale 2 puffs 2 times per day for 30 days  Sent To: [facility] #[id number] - [id number] - [address] [place] [address]  Prescription: albuterol sulfate (Ventolin HFA) 90 mcg/actuation inhalation HFA aerosol inhaler, inhale 2 puffs every 4-6 hours as needed  Sent To: [facility] #[id number] - [id number] - [address] [place], [place] [id number]

## 2024-11-08 ENCOUNTER — E-VISIT (OUTPATIENT)
Dept: FAMILY MEDICINE CLINIC | Facility: TELEHEALTH | Age: 34
End: 2024-11-08
Payer: COMMERCIAL

## 2024-11-08 ENCOUNTER — E-VISIT (OUTPATIENT)
Dept: ADMINISTRATIVE | Facility: OTHER | Age: 34
End: 2024-11-08
Payer: COMMERCIAL

## 2024-11-08 NOTE — E-VISIT ESCALATED
Status: Referred Out  Date: 2024 07:01:45  Acuity Level: Within 8 hours  Referral message:  We're sorry you are not feeling well. Your safety is important to us. Because the back pain in the area you reported is a common symptom of a kidney infection, it is possible the infection spread to the kidneys. For this reason, we   would like for you to be seen in person to determine the cause of your symptoms and most effective treatment for you.  For the most appropriate care, please be seen:   At a clinic or urgent care  Within 8 hours   You won't be charged for this visit. We   hope you feel better soon!   Patient: Karma Steen  Patient : 1990  Patient Address: [address] [place] [address]  Patient Phone: [phone]  Clinician Response: Unavailable  Diagnosis: Unavailable  Diagnosis ICD: Unavailable     Patient Interview Questions and Responses:  Clinical Protocol: UTI  Please select the reason for your visit today.: Urinary tract infection (UTI)  When did your symptoms start?: 1-3 days ago  Do you have any of the following symptoms? Pain or burning when urinating: Yes  Do you have any of the following symptoms? Urinating more often than usual: Yes  Do you have any of the following symptoms? A sudden urge to urinate: Yes  Do you have any of the following symptoms? Unable to hold urine: Yes  Do you have any of the following symptoms? Feeling like the bladder is never empty: Yes  Do you have any of the following symptoms? Foul-smelling urine: Yes  What color is your urine?: Yellow  Do you have any of the following vaginal symptoms? Discharge: No  Do you have any of the following vaginal symptoms? Itching: No  Do you have any of the following symptoms? Nausea: No  Do you have any of the following symptoms? Vomiting: No  Do you have any of the following symptoms? Abdominal pain: No  A bladder infection can spread to the kidneys, which often results in pain or tenderness felt usually on one (and  rarely both) sides of the mid-back. The pain is usually located on either side (between the ribs and hips) rather than the center of the   back. This kind of pain near your kidney can indicate a severe kidney infection.Since the symptoms started, have you had new pain in the flank or back area just beneath the ribs where the kidneys are located?: Yes, on the right flank

## 2024-11-08 NOTE — E-VISIT TREATED
Date: 2024 08:36:13  Clinician: Karen Rodriguez  Clinician NPI: [id number]  Patient: Karma Steen  Patient : 1990  [place] [address] [place] [id number]  Patient Phone: [phone]  Visit Protocol: UTI  Patient Summary:  Karma is a 34 year old ( : 1990 ) female who initiated a visit for a presumed bladder infection.    The symptoms started 1-3 days ago and consist of feeling as if the bladder is never empty, urgency, urinary frequency,   dysuria, urinary incontinence, and foul-smelling urine.   Symptom details   Urine color: Yellow    Denied symptoms include vomiting, vaginal itching, abdominal pain, vaginal discharge, nausea, and chills. Karma denies flank pain. Karma does not   feel feverish.   Karma has not used any over-the-counter medications or home remedies to relieve the current symptoms.  Precipitating events  Karma denies having a sexually transmitted disease.  Pertinent medical history  Karma has had a bladder   infection before but has not had any in the past 12 months. The current symptoms are not similar to previous bladder infection symptoms.   Ciprofloxacin (Cipro) has been effective in treating past bladder infections.   Karma has experienced problems   or side effects with the following antibiotics in the past: cephalexin (Keflex)   Karma does not get a yeast infection when taking antibiotics.   Karma has not been prescribed antibiotics to prevent frequent or repeated bladder infections in the   past and does not get yeast infections when taking antibiotics.   Karma has not had a procedure or surgery done to the urinary tract. Karma has not been diagnosed with advanced kidney disease.   Karma has not used a catheter and denies being a   patient in a hospital or nursing home in the past 2 weeks. Karma does not have diabetes. Immunosuppressive conditions (e.g., chemotherapy, HIV, organ transplant, long-term use of steroids  or other immunosuppressive medications, splenectomy) were   denied.   Karma does not smoke or use smokeless tobacco. Karma does not vape or use other e-cigarette products.   Karma denies pregnancy and denies breastfeeding.   Reason for repeat visit for the same protocol within 24 hours:  Need to request   doctors note  See the History of referred by protocol and completed visits section for details on previous visits (visits currently in queue to be diagnosed will not appear in this section).    MEDICATIONS: ipratropium-albuterol inhalation, albuterol sulfate inhalation, budesonide-formoterol inhalation, fluticasone propionate nasal, hydroxyzine pamoate oral, ipratropium-albuterol inhalation, ALLERGIES: Keflex, lamotrigine, latex, cefdinir,   cephalexin, cefprozil, cefixime, cefaclor  Clinician Response:  Dear Karma,  Based on the information you have provided, you have an acute urinary tract infection, also called a bladder infection. Bladder infections occur when bacteria from the outside of the body enters the urinary tract. Any   part of the urinary system can be infected, but the bladder is the most common.  Medication information  I am prescribing:     Nitrofurantoin monohyd/m-cryst (Macrobid) 100 mg oral capsule. Take 1 capsule by mouth every 12 hours for 5 days. Take this medication with food. There are no refills with this prescription.   The medication I prescribed for your bladder infection is an   antibiotic. Continue taking the medication until it is gone even if you feel better.   Yeast infections can be a common side effect of antibiotics. The most common symptom of a yeast infection is itchiness in and around the vagina. Other signs and   symptoms include burning, redness of the vulva (the outer part of the female genitals), or a thick, white vaginal discharge that looks like cottage cheese and does not have a bad smell.  Self care  Urination helps to flush bacteria from the urinary    tract. For this reason, drinking water and urinating often helps relieve some urinary symptoms and can decrease your risk of getting bladder infections in the future.  Other steps you can take to prevent future bladder infections include:     Wipe front to back after using the bathroom    Urinate after sexual intercourse    Avoid using deodorant sprays, douches, or powders in the vaginal area     When to seek care  Please make an appointment to be seen in a clinic or urgent care if any of the following occur:     You develop new symptoms or your symptoms become worse    You have medication side effects that make it difficult to take them as prescribed    Your symptoms do not improve within 1-2 days of starting treatment    You have symptoms of a bladder infection that return shortly after completing treatment     It is possible to have an allergic reaction to an antibiotic even if you have not had one in the past. If you notice a new rash, significant swelling, or difficulty breathing, stop taking this medication immediately and go to a clinic or urgent care.   Diagnosis: Acute uncomplicated bladder infection  Diagnosis ICD: N39.0    Follow up instructions: ATTENTION: If you have been prescribed medications, your prescriptions will not be sent until you choose your pharmacy.  To do so open the link within your notification, or go to [employer] and click eVisit in the menu to open your   treatment plan. From there, you can select your pharmacy at the bottom of your after visit summary. You can also go to [email or web address]  Prescriptions  Prescription: nitrofurantoin monohyd/m-cryst (Macrobid) 100 mg oral capsule, take 1 capsule by mouth every 12 hours for 5 days

## 2024-11-08 NOTE — E-VISIT ESCALATED
Date: 2024 08:27:37  Clinician: Karen Rodriguez  Clinician NPI: [id number]  Patient: Karma Steen  Patient : 1990  [place] [address] [place] [id number]  Patient Phone: [phone]  Visit Protocol: UTI  Patient Summary:  Karma is a 34 year old ( : 1990 ) female who initiated a visit for a presumed bladder infection.    The symptoms started 1-3 days ago and consist of feeling as if the bladder is never empty, urgency, urinary frequency,   dysuria, urinary incontinence, and foul-smelling urine.   Symptom details   Urine color: Yellow    Denied symptoms include vomiting, vaginal itching, abdominal pain, vaginal discharge, nausea, and chills. Karma denies flank pain. Karma does not   feel feverish.   Karma has not used any over-the-counter medications or home remedies to relieve the current symptoms.  Precipitating events  Karma denies having a sexually transmitted disease.  Pertinent medical history  Karma has had a bladder   infection before but has not had any in the past 12 months. The current symptoms are not similar to previous bladder infection symptoms.   Ciprofloxacin (Cipro) has been effective in treating past bladder infections.   Karma has experienced problems   or side effects with the following antibiotics in the past: cephalexin (Keflex)   Karma does not get a yeast infection when taking antibiotics.   Karma has not been prescribed antibiotics to prevent frequent or repeated bladder infections in the   past and does not get yeast infections when taking antibiotics.   Karma has not had a procedure or surgery done to the urinary tract. Karma has not been diagnosed with advanced kidney disease.   Karma has not used a catheter and denies being a   patient in a hospital or nursing home in the past 2 weeks. Karma does not have diabetes. Immunosuppressive conditions (e.g., chemotherapy, HIV, organ transplant, long-term use of steroids  or other immunosuppressive medications, splenectomy) were   denied.   Karma does not smoke or use smokeless tobacco. Karma does not vape or use other e-cigarette products.   Karma denies pregnancy and denies breastfeeding.   Reason for repeat visit for the same protocol within 24 hours:  Can't get to   doctor, no car  See the History of referred by protocol and completed visits section for details on previous visits (visits currently in queue to be diagnosed will not appear in this section).    MEDICATIONS: ipratropium-albuterol inhalation, albuterol sulfate inhalation, budesonide-formoterol inhalation, fluticasone propionate nasal, hydroxyzine pamoate oral, ipratropium-albuterol inhalation, ALLERGIES: lamotrigine, latex, cefdinir, cephalexin,   cefprozil, cefixime, cefaclor, Keflex  Clinician Response:  Dear Karma,   I am sorry you are not feeling well. To determine the most appropriate care for you, I would like you to be seen in person to further discuss your health history and symptoms.  You will not be charged for this visit.   Thank you for trusting us with your care.   Diagnosis: Refer for additional evaluation  Diagnosis ICD: R69  Additional Clinician Notes:  You have created two visits.  I will have to refer you to a higher level of evaluation on this visit so that you do not get charged for 2 visits.  I will take care of you on the other.

## 2024-11-08 NOTE — E-VISIT ESCALATED
Status: Referred Out  Date: 2024 07:03:23  Acuity Level: Not applicable  Referral message:  A visit is not appropriate for you because you indicated you are experiencing an emergency. For the most appropriate care, call 911 or go to an emergency room and be seen immediately.  If you have thoughts of harming yourself or   others, help from a trained counselor is available 24 hours a day to talk to you and provide help through the [facility]. Call, text, or chat with a trained counselor by dialing [employer] or [phone]) [phone]. For more information on the [facility]   [facility] Lif[facility], visit: [email or web address].   Patient: Karma Steen  Patient : 1990  Patient Address: [address] [place] [address]  Patient Phone: [phone]  Clinician Response: Unavailable  Diagnosis: Unavailable  Diagnosis ICD: Unavailable     Patient Interview Questions and Responses: None available

## 2024-11-19 ENCOUNTER — E-VISIT (OUTPATIENT)
Dept: FAMILY MEDICINE CLINIC | Facility: TELEHEALTH | Age: 34
End: 2024-11-19
Payer: COMMERCIAL

## 2024-11-19 PROCEDURE — FABRICHEALTHVISIT: Performed by: NURSE PRACTITIONER

## 2024-11-20 RX ORDER — PREDNISONE 10 MG/1
TABLET ORAL
Qty: 1 EACH | Refills: 0 | Status: SHIPPED | OUTPATIENT
Start: 2024-11-20

## 2024-11-20 NOTE — E-VISIT TREATED
Date: 2024 21:41:48  Clinician: China Tapia  Clinician NPI: [id number]  Patient: Karma Steen  Patient : 1990  Patient Address: [address] [place] [address]  Patient Phone: [phone]  Visit Protocol: URI  Patient Summary:  Karma is a 34 year old ( : 1990 ) female who initiated a visit for cold, sinus infection, or influenza.     Karma states the symptoms started gradually 3-5 days ago. After the symptoms started, they improved and then got   worse again.   Symptom start date: Unknown   The symptoms consist of a headache, myalgia, nasal congestion, a cough, malaise, a sore throat, chills, rhinitis, enlarged lymph nodes, and diarrhea. Karma is experiencing difficulty breathing due to nasal   congestion but is not short of breath. Karma also feels feverish but was unable to measure temperature.   Symptom details     Nasal secretions: The color of the mucus is yellow and green.    Cough: Karma coughs every 5-10 minutes and the cough is more bothersome at night. Phlegm comes into the throat when coughing. Karma believes the cough is caused by post-nasal drip. The color of the phlegm is yellow and green.     Sore throat: Karma reports having mild throat pain (1-3 on a 10 point pain scale), does not have exudate on the tonsils, and can swallow liquids without any difficulty. The lymph nodes in the neck are enlarged. The lymph node swelling is worse on one   side and the swelling has not caused changes in speech or hoarseness in the voice. A rash has not appeared on the skin since the sore throat started.     Headache: The headache is mild (1-3 on a 10 point pain scale).      Karma denies having nausea, teeth pain, ageusia, facial pain or pressure, wheezing, anosmia, ear pain, and vomiting. Karma also denies having recent facial or sinus surgery in the past 60 days, having a sinus infection within the past year, and   taking antibiotic medication in the past  month.   Precipitating events  Karma is not sure if they have been exposed to someone with strep throat. Karma has recently been exposed to someone with influenza. Karma has been in close contact with the   following high risk individuals: people with asthma, heart disease or diabetes.    Karma has not received the influenza vaccination.   Pertinent COVID-19 (Coronavirus) information  Since the symptoms started, Karma has not tested for COVID-19.     Karma has not had COVID-19 in the last 3 months.   Karma has not received a COVID-19 vaccine in the past year.   Pertinent medical history    Karma reports having the following conditions:   Mental health conditions   Karma has asthma.   Karma uses quick-relief inhaler less than two times per week and wakes up at night with asthma symptoms less than two times per month. Karma refills the quick-relief inhaler more than two times per year.   A provider has not told Karma to avoid   NSAIDs.   Karma does not get yeast infections when an antibiotic is taken.   Karma does not have diabetes. Karma denies having immunosuppressive conditions (e.g., chemotherapy, HIV, organ transplant, long-term use of steroids or other   immunosuppressive medications, splenectomy).   Karma does not smoke or use smokeless tobacco. Karma does not vape or use other e-cigarette products.   Karma denies pregnancy and denies breastfeeding.   Weight: 262 lbs (118.84 kg)    MEDICATIONS: ipratropium-albuterol inhalation, albuterol sulfate inhalation, budesonide-formoterol inhalation, fluticasone propionate nasal, hydroxyzine pamoate oral, ipratropium-albuterol inhalation, ALLERGIES: Keflex, lamotrigine, latex, cefdinir,   cephalexin, cefprozil, cefixime, cefaclor  Clinician Response:  Dear Karma,  Based on the information provided, you have acute bacterial sinusitis, also known as a sinus infection. Sinus infections are caused by bacteria or a  virus and symptoms are almost always identical. The difference   between the 2 types of infections is timing.  Sinus infections start as viral infections and symptoms improve on their own in about 7 days. A bacterial infection may have developed if any of the following apply to you:     You have had 7 days of symptoms and are experiencing at least 2 of the following:       Fever    Facial pressure or headache    Green or yellow nasal mucus    Symptoms that improved and then got worse again       You have had symptoms for 10 or more days     Medication information  I am prescribing:       Fluticasone 50 mcg/actuation nasal spray. Inhale 2 sprays in each nostril 1 time per day; after 1 week, may adjust to 1 - 2 sprays in each nostril 1 time per day. This medication takes several days to start working, so keep taking it even if it doesn't   help right away. There are no refills with this prescription.      Doxycycline hyclate 100 mg oral tablet. Take 1 tablet by mouth 2 times per day for 7 days. There are no refills with this prescription.      Promethazine-DM 6.25-15 mg/5 mL oral syrup. Take 5 mL by mouth every 4-6 hours as needed (maximum of 30 mL in 24 hours) for cough. There are no refills with this prescription.     Certain antibiotics, such as doxycycline, levofloxacin, and moxifloxacin, can cause your skin to be more sensitive to the sun. Exposure to the sun when taking these antibiotics may cause a skin rash, itching, redness in lighter skin tones,   discoloration in darker skin tones, or a severe sunburn. Be sure to avoid direct exposure to the sun, even for short periods of time, especially between 10 am and 3 pm if possible. Protective clothing from the sun and a broad-spectrum sunscreen is   recommended for all skin types when outdoors.   We recommend a water-resistant, tinted, mineral, broad-spectrum sunscreen, SPF 30 or higher, that contains iron oxide or titanium dioxide. Reapply every 2 hours.  Sunscreens that also contain antioxidants   can reduce your risk of sun damage and will enhance the sunscreen's effects providing additional protection for your skin. For more information, please visit the following link:  Sunscreen education  Yeast infections can be a common side effect of   antibiotics. The most common symptom of a yeast infection is itchiness in and around the vagina. Other signs and symptoms include burning, redness of the vulva (the outer part of the female genitals), or a thick, white vaginal discharge that looks like   cottage cheese and does not have a bad smell.  If you become pregnant during this course of treatment, stop taking the medication and contact your primary care provider.  Tips for using a nasal spray:     When the medication is being sprayed in your nose, point the tip of the nasal spray towards your ear.    Do not blow your nose after using the spray.    Do not lean your head back after using the spray as it will go down your throat.    Wipe the tip of the nose piece after use with a dry, clean tissue.     Self care  Steps you can take to be as comfortable as possible:     Rest.    Drink plenty of fluids.    Take a warm shower to loosen congestion.    Use a cool-mist humidifier.    Use throat lozenges.    Suck on frozen items such as popsicles.    Drink hot tea with lemon and honey.    Gargle with warm salt water (1/4 teaspoon of salt per 8 ounce glass of water).    Take a spoonful of honey to reduce your cough.     When to seek care  Please be seen in a clinic or urgent care if any of the following occur:     New symptoms develop, or symptoms become worse    Symptoms do not start to improve after 3 days of treatment     Call 911 or go to the emergency room if any of the following occur:     Difficulty breathing    If you feel that your throat is closing off    Suddenly develop a rash    Unable to swallow fluids or are drooling     It is possible to have an allergic reaction  to an antibiotic even if you have not had one in the past. If you notice a new rash, significant swelling, or difficulty breathing, stop taking this medication immediately and go to a clinic or urgent care.    For the latest updates on COVID-19 (Coronavirus), please visit the Centers for Disease Control and Prevention (CDC). Also, your state and local health department websites may provide additional guidance regarding testing and isolation recommendations for   your location.   Diagnosis: Acute bacterial sinusitis  Diagnosis ICD: J01.90    Follow up instructions: ATTENTION: If you have been prescribed medications, your prescriptions will not be sent until you choose your pharmacy.  To do so open the link within your notification, or go to [employer] and click eVisit in the menu to open your   treatment plan. From there, you can select your pharmacy at the bottom of your after visit summary. You can also go to [email or web address]  Prescriptions  Prescription: fluticasone 50 mcg/actuation nasal spray,suspension, inhale 2 sprays in each nostril 1 time per day; after 1 week, may adjust to 1 - 2 sprays in each nostril 1 time per day.  Sent To: [facility] #[id number] - [id number] - [address] [place] [address]  Prescription: promethazine-DM 6.25-15 mg/5 mL oral syrup, take 5 milliliters by mouth every 4 to 6 hours as needed for cough  Sent To: [facility] #[id number] - [id number] - [address] [place] [address]  Prescription: doxycycline hyclate 100 mg oral tablet, take 1 tablet by mouth 2 times per day for 7 days  Sent To: [facility] #[id number] - [id number] - [address] [place] [address]  Prescription: prednisone 10 mg oral tablets,dose pack, take tablets,dose pack  Sent To: [facility] #[id number] - [id number] - [address] [place] [address]

## 2024-12-28 ENCOUNTER — E-VISIT (OUTPATIENT)
Dept: ADMINISTRATIVE | Facility: OTHER | Age: 34
End: 2024-12-28
Payer: COMMERCIAL

## 2024-12-28 NOTE — E-VISIT ESCALATED
Status: Referred Out  Date: 2024 02:19:39  Acuity Level: Not applicable  Referral message: Based on the information you provided during your interview, eVisit is not appropriate for treating your condition.  Patient: Karma Steen  Patient : 1990  Patient Address: [address] [facility] [place] [address]  Patient Phone: [phone]  Clinician Response: Unavailable  Diagnosis: Unavailable  Diagnosis ICD: Unavailable     Patient Interview Questions and Responses: None available

## 2024-12-30 ENCOUNTER — E-VISIT (OUTPATIENT)
Dept: FAMILY MEDICINE CLINIC | Facility: TELEHEALTH | Age: 34
End: 2024-12-30
Payer: COMMERCIAL

## 2024-12-30 RX ORDER — ALBUTEROL SULFATE 90 UG/1
2 INHALANT RESPIRATORY (INHALATION) EVERY 4 HOURS PRN
Start: 2024-12-30

## 2024-12-30 NOTE — E-VISIT TREATED
Date: 2024 17:23:24  Clinician: Renetta Kaur  Clinician NPI: [id number]  Patient: Karma Steen  Patient : 1990  Patient Address: [address] [place] [address]  Patient Phone: [phone]  Visit Protocol: URI  Patient Summary:  Karma is a 34 year old ( : 1990 ) female who initiated a visit for cold, sinus infection, or influenza.     Karma states the symptoms started suddenly 3-5 days ago. After the symptoms started, they improved and then got   worse again.   Symptom start date: 2024   The symptoms consist of chills, myalgia, nasal congestion, nausea, malaise, rhinitis, a cough, and a headache. Karma is experiencing mild difficulty breathing with daily activities but can speak normally   in full sentences. Karma also feels feverish.   Symptom details     Nasal secretions: The color of the mucus is clear.    Cough: Karma coughs a few times an hour and the cough is not more bothersome at night. Phlegm does not come into the throat when coughing. Karma does not believe the cough is caused by post-nasal drip.     Temperature: Current temperature is 100.4 degrees Fahrenheit. The duration of the fever is not known.     Headache: The headache is mild (1-3 on a 10 point pain scale).      Karma has an oximeter at home. The oxygen saturation reading is between %.   Karma denies having anosmia and ageusia, diarrhea, teeth pain, wheezing, facial pain or pressure, sore throat, ear pain, and vomiting. Karma also denies taking   antibiotic medication in the past month and having recent facial or sinus surgery in the past 60 days.   Precipitating events  Karma has recently been exposed to someone with influenza. Karma has not been in close contact with any high risk   individuals.    Karma has not received the influenza vaccination.   Pertinent COVID-19 (Coronavirus) information  Since the symptoms started, Karma has not tested for COVID-19.    Karma has not had COVID-19 in the last 3 months.   Karma has   not received a COVID-19 vaccine in the past year.   Pertinent medical history    Karma has asthma. Karma uses quick-relief inhaler less than two times per week and wakes up at night with asthma symptoms less than two times per month. Karma   refills the quick-relief inhaler more than two times per year.   Karma had 2 sinus infections within the past year.   A provider has not told Karma to avoid NSAIDs.   Karma does not get yeast infections when an antibiotic is taken.   Karma   does not have diabetes. Karma denies having immunosuppressive conditions (e.g., chemotherapy, HIV, organ transplant, long-term use of steroids or other immunosuppressive medications, splenectomy).   Karma denies having chronic lung disease, cystic   fibrosis, hypertension, long-term disabilities, mental health conditions, sickle cell disease or thalassemia, stroke or other cardiovascular disease, substance use disorders, or tuberculosis (TB).  Karma does not smoke or use smokeless tobacco.   Karma does not vape or use other e-cigarette products.   Karma denies pregnancy and denies breastfeeding.   Additional information as reported by the patient (free text): Exposed to covid on [place]. Need inhaler refill please   Weight: 250 lbs   (113.4 kg)    MEDICATIONS: ipratropium-albuterol inhalation, albuterol sulfate inhalation, budesonide-formoterol inhalation, fluticasone propionate nasal, hydroxyzine pamoate oral, ipratropium-albuterol inhalation, ALLERGIES: Keflex, lamotrigine, latex, cefdinir,   cephalexin, cefprozil, cefixime, cefaclor  Clinician Response:  Dear Karma,  Your health is our priority. Based on the information you have provided, it is possible that you may have some type of viral infection.  Please read the full treatment plan and see my recommendations below.    Medication information  Because you have a viral  infection, antibiotics will not help you get better. Treating a viral infection with antibiotics could actually make you feel worse.  I am prescribing:     Albuterol sulfate (Ventolin HFA) 90 mcg/actuation aerosol inhaler. Inhale 2 puffs every 4-6 hours as needed for 5 days. There are no refills with this prescription.   Self care  Steps you can take to be as comfortable as possible:     Rest.    Drink plenty of fluids.    Take a warm shower to loosen congestion.    Use a cool-mist humidifier.    Take a spoonful of honey to reduce your cough.     When to seek care  Please be seen in a clinic or urgent care if any of the following occur:     New symptoms develop, or symptoms become worse    If your oxygen level drops below 95%.     Call 911 or go to the emergency room if any of the following occur:     If you feel that your throat is closing off    Suddenly develop a rash    Unable to swallow fluids or are drooling     For the latest updates on COVID-19 (Coronavirus), please visit the Centers for Disease Control and Prevention (CDC). Also, your state and local health department websites may provide additional guidance regarding testing and isolation recommendations   for your location.   Diagnosis: COVID-19 (Coronavirus) concern  Diagnosis ICD: Z20.828    Follow up instructions: ATTENTION: If you have been prescribed medications, your prescriptions will not be sent until you choose your pharmacy.  To do so open the link within your notification, or go to [employer] and click eVisit in the menu to open your   treatment plan. From there, you can select your pharmacy at the bottom of your after visit summary. You can also go to [email or web address]  Prescriptions  Prescription: albuterol sulfate (Ventolin HFA) 90 mcg/actuation inhalation HFA aerosol inhaler, inhale 2 puffs every 4-6 hours as needed for 5 days  Sent To: [facility] #[id number] - [id number] - [address] [facility]  [place] [address]

## 2025-03-04 ENCOUNTER — E-VISIT (OUTPATIENT)
Dept: FAMILY MEDICINE CLINIC | Facility: TELEHEALTH | Age: 35
End: 2025-03-04
Payer: COMMERCIAL

## 2025-03-04 PROCEDURE — FABRICHEALTHVISIT: Performed by: NURSE PRACTITIONER

## 2025-03-04 NOTE — E-VISIT TREATED
Date: 2025 09:58:56  Clinician: Saige Bruce  Clinician NPI: [id number]  Patient: Karma Steen  Patient : 1990  Patient Address: [address] [place] [address]  Patient Phone: [phone]  Visit Protocol: URI  Patient Summary:  Karma is a 34 year old ( : 1990 ) female who initiated a visit for cold, sinus infection, or influenza.     Karma states the symptoms started 1-2 days ago.   Symptom start date: 2025   The symptoms consist of   malaise, chills, diarrhea, myalgia, nausea, and vomiting. Karma also feels feverish.   Symptom details   Temperature: Current temperature is 99.96 degrees Fahrenheit.    Karma denies having facial pain or pressure, teeth pain, nasal congestion, ear   pain, rhinitis, sore throat, anosmia and ageusia, headache, wheezing, and cough. Karma also denies having recent facial or sinus surgery in the past 60 days and taking antibiotic medication in the past month. Karma is not experiencing dyspnea.     Precipitating events  Karma has recently been exposed to someone with influenza. Karma has not been in close contact with any high risk individuals.    Karma has not received the influenza vaccination.   Pertinent COVID-19 (Coronavirus)   information  Since the symptoms started, Karma has not tested for COVID-19. Karma was not able to re-test today.   Karma has received a COVID-19 vaccine in the past year.     Pertinent medical history   Karma reports having the following   immunosuppressive condition(s): long-term use of steroids or other immunosuppressive medication   Karma also reports having the following conditions:   Mental health conditions   Karma has asthma. Karma uses quick-relief inhaler less than two   times per week and wakes up at night with asthma symptoms less than two times per month. Karma refills the quick-relief inhaler more than two times per year.   Karma typically gets a yeast  "infection when an antibiotic is taken. Karma has   successfully used Diflucan and prescription medication other than Diflucan, Gynazole, and Terconazole to treat previous yeast infections. 1 dose of fluconazole (Diflucan) has typically been sufficient for symptoms to resolve in the past.   A provider has   not told Karma to avoid NSAIDs.   Karma does not have diabetes.   Karma does not smoke or use smokeless tobacco. Karma does not vape or use other e-cigarette products.   Karma denies pregnancy and denies breastfeeding.   Weight: 236 lbs   (107.05 kg)    MEDICATIONS: albuterol sulfate inhalation, ipratropium-albuterol inhalation, albuterol sulfate inhalation, budesonide-formoterol inhalation, hydroxyzine pamoate oral, ipratropium-albuterol inhalation, ALLERGIES: Keflex, lamotrigine, latex, cefdinir,   cephalexin, cefprozil, cefixime, cefaclor  Clinician Response:  Dear Karma,   What is viral gastroenteritis?  This is an infection that can cause diarrhea and vomiting. It happens when a person's stomach and intestines get infected with a virus (figure 1). One of the most common causes of   gastroenteritis is norovirus. But other viruses can cause it, too.     People can get viral gastroenteritis if they:     *Touch an infected person or a surface with the virus on it, and then don't wash their hands     *Eat foods or drink liquids with the   virus in them. If people with the virus don't wash their hands, they can spread it to food or liquids they touch.     What are the symptoms of viral gastroenteritis?  The infection causes diarrhea and vomiting. People can have either one or both. These   symptoms usually start suddenly, and can be severe.     Viral gastroenteritis can also cause:     *Fever     *Headache or muscle aches     *Belly pain or cramping     *Loss of appetite     If you have a lot of diarrhea and vomiting, your body might lose   too much water. This is called \"dehydration.\" It can " "make you feel thirsty, tired, dizzy, or even confused. It can also make your urine look dark yellow.     Severe dehydration can be life-threatening. Older people are more likely to get severe   dehydration.     Will I need tests?  Not usually. Your doctor or nurse should be able to tell if you have viral gastroenteritis by learning about your symptoms and doing an exam. But the doctor or nurse might do tests to check for dehydration or to see   which virus is causing the infection. These tests can include:     *Blood tests     *Urine tests     *Tests on a sample of bowel movement     What can I do on my own to feel better?  You need to replace your body's fluids that are lost through vomiting   and diarrhea:     *Drink fluids when you can. It might help to take small sips every 15 to 30 minutes. Try to drink more as you start to feel better.     *When you have a lot of vomiting or diarrhea, your body loses both water and salt. Drinking fluids   that contain some salt can help replace what your body has lost. Examples include \"oral rehydration solutions\" or broth. If you drink a lot of plain water, make sure that you are also eating. This will help your body keep the right salt and water   balance.     *Avoid drinks with a lot of sugar, like juice or soda. Avoid alcohol, too.     *Eat when you can. If you can keep food down, it's best to eat lean meats, fruits, vegetables, and whole-grain breads and cereals. Avoid eating foods with a lot   of fat or sugar, which can make symptoms worse.     If you are an adult younger than 65 and you have a new bout of diarrhea, and no fever and no blood in your bowel movements, you can take medicine to stop diarrhea such as loperamide (brand name:   Imodium) for 1 to 2 days. But if you are older than 65, have a fever, or have blood in your bowel movements, do not take these medicines without checking with your doctor.     If you have diabetes, you might need to check your blood " "sugar more often   until you feel better. Ask your doctor or nurse about this.     When should I call the doctor or nurse?  Call if you:     *Have any symptoms of dehydration, like feeling very tired, thirsty, dizzy, or confused     *Have diarrhea or vomiting that lasts   longer than a few days     *Vomit up blood, have bloody diarrhea, or have severe belly pain     *Haven't been able to drink anything for many hours     *Haven't needed to urinate in the past 6 to 8 hours (during the day)     How is viral gastroenteritis   treated?  Most people do not need any treatment, because their symptoms will get better on their own. But people with severe dehydration might need treatment in the hospital. This involves getting fluids through a thin tube that goes into a vein, called   an \"IV.\"     Doctors do not treat viral gastroenteritis with antibiotics. That's because antibiotics treat infections that are caused by bacteria, not viruses.     Can viral gastroenteritis be prevented?  Sometimes. To lower the chance of getting or   spreading the infection, wash your hands well with soap and water:     *After you use the bathroom     *Before you eat     *Before you prepare food     Wash your hands well after changing a diaper. Do not change diapers near where you cook or eat food.    Diagnosis: Vomiting, unspecified  Diagnosis ICD: R11.10    Follow up instructions: ATTENTION: If you have been prescribed medications, your prescriptions will not be sent until you choose your pharmacy.  To do so open the link within your notification, or go to [employer] and click eVisit in the menu to open your   treatment plan. From there, you can select your pharmacy at the bottom of your after visit summary. You can also go to [email or web address]  Prescriptions  Prescription: ondansetron HCl 8 mg oral tablet, take 1 tablet by mouth 3 times per day as needed for nausea and vomiting for 5 days  Sent To: [facility] #[id number] - [id number] - [address] [place] [address]

## 2025-05-24 ENCOUNTER — E-VISIT (OUTPATIENT)
Dept: ADMINISTRATIVE | Facility: OTHER | Age: 35
End: 2025-05-24
Payer: COMMERCIAL

## 2025-05-24 NOTE — E-VISIT ESCALATED
Status: Referred Out  Date: 2025 13:20:00  Acuity Level: Not applicable  Referral message: Based on the information you provided during your interview, eVisit is not appropriate for treating your condition.  Patient: Karma Steen  Patient : 1990  [address] [facility] [facility] [place] [address]  Patient Phone: [phone]  Clinician Response: Unavailable  Diagnosis: Unavailable  Diagnosis ICD: Unavailable     Patient Interview Questions and Responses: None available

## 2025-06-06 ENCOUNTER — E-VISIT (OUTPATIENT)
Dept: ADMINISTRATIVE | Facility: OTHER | Age: 35
End: 2025-06-06
Payer: COMMERCIAL

## 2025-06-06 NOTE — E-VISIT ESCALATED
Status: Referred Out  Date: 2025 09:34:14  Acuity Level: Within 8 hours  Referral message:  We're sorry you are not feeling well. Your safety is important to us. Because the back pain in the area you reported is a common symptom of a kidney infection, it is possible the infection spread to the kidneys. For this reason, we   would like for you to be seen in person to determine the cause of your symptoms and most effective treatment for you.  For the most appropriate care, please be seen:   At a clinic or urgent care  Within 8 hours   You won't be charged for this visit. We   hope you feel better soon!   Patient: Karma Steen  Patient : 1990  Patient Address: [address] [place] [address]  Patient Phone: [phone]  Clinician Response: Unavailable  Diagnosis: Unavailable  Diagnosis ICD: Unavailable     Patient Interview Questions and Responses:  Clinical Protocol: UTI  Please select the reason for your visit today.: Urinary tract infection (UTI)  When did your symptoms start?: 1-3 days ago  Do you have any of the following symptoms? Pain, burning, or discomfort with urination: Yes  Do you have any of the following symptoms? Urinating more often than usual: No  Do you have any of the following symptoms? A sudden urge to urinate: Yes  Do you have any of the following symptoms? Unable to hold urine: Yes  Do you have any of the following symptoms? Feeling like the bladder is never empty: Yes  Do you have any of the following symptoms? Foul-smelling urine: Yes  What color is your urine?: Yellow  Do you have any of the following vaginal symptoms? Discharge: No  Do you have any of the following vaginal symptoms? Itching: No  Do you have any of the following symptoms? Nausea: No  Do you have any of the following symptoms? Vomiting: No  Do you have any of the following symptoms? Abdominal pain: No  A bladder infection can spread to the kidneys, which often results in pain or tenderness felt usually on  one (and rarely both) sides of the mid-back. The pain is usually located on either side (between the ribs and hips) rather than the center of the   back. This kind of pain near your kidney can indicate a severe kidney infection.Since the symptoms started, have you had new pain in the flank or back area just beneath the ribs where the kidneys are located?: Yes, on the right flank

## 2025-08-13 ENCOUNTER — E-VISIT (OUTPATIENT)
Dept: ADMINISTRATIVE | Facility: OTHER | Age: 35
End: 2025-08-13
Payer: COMMERCIAL